# Patient Record
Sex: MALE | Race: BLACK OR AFRICAN AMERICAN | Employment: UNEMPLOYED | URBAN - METROPOLITAN AREA
[De-identification: names, ages, dates, MRNs, and addresses within clinical notes are randomized per-mention and may not be internally consistent; named-entity substitution may affect disease eponyms.]

---

## 2018-08-15 PROBLEM — F10.20 ALCOHOL DEPENDENCE (HCC): Status: ACTIVE | Noted: 2018-08-15

## 2018-08-15 PROBLEM — F33.2 MAJOR DEPRESSIVE DISORDER, RECURRENT EPISODE, SEVERE (HCC): Status: ACTIVE | Noted: 2018-08-15

## 2018-08-15 PROBLEM — F32.A DEPRESSION: Status: ACTIVE | Noted: 2018-08-15

## 2019-09-20 ENCOUNTER — HOSPITAL ENCOUNTER (OUTPATIENT)
Dept: MRI IMAGING | Age: 59
Discharge: HOME | End: 2019-09-20
Attending: ORTHOPAEDIC SURGERY
Payer: MEDICAID

## 2019-09-20 DIAGNOSIS — M51.26 LUMBAR DISC HERNIATION: ICD-10-CM

## 2019-09-20 DIAGNOSIS — M48.061 LUMBAR STENOSIS: ICD-10-CM

## 2019-09-20 PROCEDURE — 72148 MRI LUMBAR SPINE W/O DYE: CPT

## 2019-09-24 ENCOUNTER — HOSPITAL ENCOUNTER (OUTPATIENT)
Dept: PREADMISSION TESTING | Age: 59
Discharge: HOME | End: 2019-09-24
Payer: MEDICAID

## 2019-09-24 VITALS
TEMPERATURE: 98 F | BODY MASS INDEX: 31.85 KG/M2 | RESPIRATION RATE: 20 BRPM | HEART RATE: 90 BPM | DIASTOLIC BLOOD PRESSURE: 81 MMHG | WEIGHT: 227.5 LBS | SYSTOLIC BLOOD PRESSURE: 136 MMHG | HEIGHT: 71 IN

## 2019-09-24 LAB
ANION GAP SERPL CALC-SCNC: 6 MMOL/L (ref 5–15)
APPEARANCE UR: CLEAR
BACTERIA URNS QL MICRO: NEGATIVE /HPF
BILIRUB UR QL: NEGATIVE
BUN SERPL-MCNC: 15 MG/DL (ref 6–20)
BUN/CREAT SERPL: 12 (ref 12–20)
CALCIUM SERPL-MCNC: 9.8 MG/DL (ref 8.5–10.1)
CHLORIDE SERPL-SCNC: 102 MMOL/L (ref 97–108)
CO2 SERPL-SCNC: 29 MMOL/L (ref 21–32)
COLOR UR: NORMAL
CREAT SERPL-MCNC: 1.22 MG/DL (ref 0.7–1.3)
EPITH CASTS URNS QL MICRO: NORMAL /LPF
ERYTHROCYTE [DISTWIDTH] IN BLOOD BY AUTOMATED COUNT: 16.1 % (ref 11.5–14.5)
EST. AVERAGE GLUCOSE BLD GHB EST-MCNC: 131 MG/DL
GLUCOSE SERPL-MCNC: 89 MG/DL (ref 65–100)
GLUCOSE UR STRIP.AUTO-MCNC: NEGATIVE MG/DL
HBA1C MFR BLD: 6.2 % (ref 4.2–6.3)
HCT VFR BLD AUTO: 39 % (ref 36.6–50.3)
HGB BLD-MCNC: 12.2 G/DL (ref 12.1–17)
HGB UR QL STRIP: NEGATIVE
HYALINE CASTS URNS QL MICRO: NORMAL /LPF (ref 0–5)
INR PPP: 1 (ref 0.9–1.1)
KETONES UR QL STRIP.AUTO: NEGATIVE MG/DL
LEUKOCYTE ESTERASE UR QL STRIP.AUTO: NEGATIVE
MCH RBC QN AUTO: 24.2 PG (ref 26–34)
MCHC RBC AUTO-ENTMCNC: 31.3 G/DL (ref 30–36.5)
MCV RBC AUTO: 77.4 FL (ref 80–99)
NITRITE UR QL STRIP.AUTO: NEGATIVE
NRBC # BLD: 0 K/UL (ref 0–0.01)
NRBC BLD-RTO: 0 PER 100 WBC
PH UR STRIP: 6 [PH] (ref 5–8)
PLATELET # BLD AUTO: 296 K/UL (ref 150–400)
PMV BLD AUTO: 9 FL (ref 8.9–12.9)
POTASSIUM SERPL-SCNC: 4.3 MMOL/L (ref 3.5–5.1)
PROT UR STRIP-MCNC: NEGATIVE MG/DL
PROTHROMBIN TIME: 10.2 SEC (ref 9–11.1)
RBC # BLD AUTO: 5.04 M/UL (ref 4.1–5.7)
RBC #/AREA URNS HPF: NORMAL /HPF (ref 0–5)
SODIUM SERPL-SCNC: 137 MMOL/L (ref 136–145)
SP GR UR REFRACTOMETRY: 1.01 (ref 1–1.03)
UA: UC IF INDICATED,UAUC: NORMAL
UROBILINOGEN UR QL STRIP.AUTO: 0.2 EU/DL (ref 0.2–1)
WBC # BLD AUTO: 6.9 K/UL (ref 4.1–11.1)
WBC URNS QL MICRO: NORMAL /HPF (ref 0–4)

## 2019-09-24 PROCEDURE — 85027 COMPLETE CBC AUTOMATED: CPT

## 2019-09-24 PROCEDURE — 36415 COLL VENOUS BLD VENIPUNCTURE: CPT

## 2019-09-24 PROCEDURE — 80048 BASIC METABOLIC PNL TOTAL CA: CPT

## 2019-09-24 PROCEDURE — 83036 HEMOGLOBIN GLYCOSYLATED A1C: CPT

## 2019-09-24 PROCEDURE — 85610 PROTHROMBIN TIME: CPT

## 2019-09-24 PROCEDURE — 86900 BLOOD TYPING SEROLOGIC ABO: CPT

## 2019-09-24 PROCEDURE — 93005 ELECTROCARDIOGRAM TRACING: CPT

## 2019-09-24 PROCEDURE — 81001 URINALYSIS AUTO W/SCOPE: CPT

## 2019-09-24 RX ORDER — IPRATROPIUM BROMIDE AND ALBUTEROL SULFATE 2.5; .5 MG/3ML; MG/3ML
3 SOLUTION RESPIRATORY (INHALATION)
COMMUNITY

## 2019-09-24 RX ORDER — FUROSEMIDE 40 MG/1
40 TABLET ORAL DAILY
COMMUNITY
End: 2021-06-28

## 2019-09-24 RX ORDER — GABAPENTIN 800 MG/1
800 TABLET ORAL 3 TIMES DAILY
COMMUNITY

## 2019-09-24 RX ORDER — BACLOFEN 10 MG/1
10 TABLET ORAL 3 TIMES DAILY
COMMUNITY

## 2019-09-24 RX ORDER — LEVALBUTEROL INHALATION SOLUTION 0.63 MG/3ML
0.63 SOLUTION RESPIRATORY (INHALATION)
COMMUNITY
End: 2020-08-10

## 2019-09-24 RX ORDER — ATORVASTATIN CALCIUM 40 MG/1
40 TABLET, FILM COATED ORAL
COMMUNITY

## 2019-09-24 RX ORDER — LANOLIN ALCOHOL/MO/W.PET/CERES
50 CREAM (GRAM) TOPICAL 2 TIMES DAILY
COMMUNITY

## 2019-09-24 RX ORDER — ACETAMINOPHEN 325 MG/1
650 TABLET ORAL
COMMUNITY
End: 2020-08-10

## 2019-09-24 RX ORDER — MONTELUKAST SODIUM 10 MG/1
10 TABLET ORAL
COMMUNITY

## 2019-09-24 RX ORDER — ONDANSETRON 4 MG/1
4 TABLET, FILM COATED ORAL
COMMUNITY

## 2019-09-24 RX ORDER — SPIRONOLACTONE 25 MG/1
25 TABLET ORAL DAILY
COMMUNITY
End: 2020-08-10

## 2019-09-24 RX ORDER — DILTIAZEM HYDROCHLORIDE 360 MG/1
360 CAPSULE, EXTENDED RELEASE ORAL
COMMUNITY
End: 2021-06-28

## 2019-09-24 NOTE — PERIOP NOTES
PATIENT IS A POOR HISTORIAN FOR HEALTH CARE. HE DOES NOT REMEMBER NAMES OF PHYSICIANS CARING FOR HIM. HE STATED HE WOULD GO BACK TO THE NURSING HOME TO RESEARCH THIS AND CALL BACK WITH NAMES AND PHONE NUMBERS. DR GILBERT'S OFFICE MADE AWARE. VOICEMAIL LEFT FOR DR [facility] NURSE.

## 2019-09-24 NOTE — PERIOP NOTES
Preoperative instructions reviewed with patient. Patient given  CHG liquid. Instructions reviewed on use of CHG liquid. Patient given SSI infection FAQ sheet. MRSA/MSSA treatment instruction sheet given with an explanation to patient that they will be notified if treatment instructions need to be initiated. Patient was given opportunity to ask questions on the information provided.

## 2019-09-25 PROBLEM — R73.09 ELEVATED HEMOGLOBIN A1C: Status: ACTIVE | Noted: 2019-09-25

## 2019-09-25 LAB
ABO + RH BLD: NORMAL
ATRIAL RATE: 83 BPM
BACTERIA SPEC CULT: ABNORMAL
BACTERIA SPEC CULT: ABNORMAL
BLOOD GROUP ANTIBODIES SERPL: NORMAL
CALCULATED P AXIS, ECG09: 58 DEGREES
CALCULATED R AXIS, ECG10: 20 DEGREES
CALCULATED T AXIS, ECG11: 39 DEGREES
DIAGNOSIS, 93000: NORMAL
P-R INTERVAL, ECG05: 160 MS
Q-T INTERVAL, ECG07: 368 MS
QRS DURATION, ECG06: 84 MS
QTC CALCULATION (BEZET), ECG08: 432 MS
SERVICE CMNT-IMP: ABNORMAL
SPECIMEN EXP DATE BLD: NORMAL
VENTRICULAR RATE, ECG03: 83 BPM

## 2019-09-25 RX ORDER — MUPIROCIN 20 MG/G
OINTMENT TOPICAL 2 TIMES DAILY
Qty: 22 G | Refills: 0 | Status: SHIPPED | OUTPATIENT
Start: 2019-09-25 | End: 2019-10-02

## 2019-09-25 NOTE — ADVANCED PRACTICE NURSE
Patient was called (identity confirmed with 2 patient identifiers) and informed of prelim MSSA, instructed to obtain mupirocin prescription and Chlorhexidine liquid soap from pharmacy per protocol. Written instructions given at time of Preadmission Testing were reinforced with patient. Patient was given an opportunity to have questions answered and contact information if needed. Also spoke to Gentry Guidry, manager at facility to ensure correct pharmacy and provide instruction, called pharmacy to ensure ability to receive electronic rx and correct pharmacy. 9/26- Final result MRSA.

## 2019-09-26 ENCOUNTER — APPOINTMENT (OUTPATIENT)
Dept: GENERAL RADIOLOGY | Age: 59
End: 2019-09-26
Attending: ORTHOPAEDIC SURGERY
Payer: MEDICAID

## 2019-09-26 ENCOUNTER — HOSPITAL ENCOUNTER (OUTPATIENT)
Age: 59
Setting detail: OBSERVATION
Discharge: NURSING, REHAB OR LONG-TERM CARE | End: 2019-09-27
Attending: ORTHOPAEDIC SURGERY | Admitting: ORTHOPAEDIC SURGERY
Payer: MEDICAID

## 2019-09-26 ENCOUNTER — ANESTHESIA EVENT (OUTPATIENT)
Dept: SURGERY | Age: 59
End: 2019-09-26
Payer: MEDICAID

## 2019-09-26 ENCOUNTER — ANESTHESIA (OUTPATIENT)
Dept: SURGERY | Age: 59
End: 2019-09-26
Payer: MEDICAID

## 2019-09-26 DIAGNOSIS — M48.061 SPINAL STENOSIS OF LUMBAR REGION, UNSPECIFIED WHETHER NEUROGENIC CLAUDICATION PRESENT: Primary | ICD-10-CM

## 2019-09-26 PROBLEM — Z22.322 MRSA CARRIER: Status: ACTIVE | Noted: 2019-09-26

## 2019-09-26 LAB
GLUCOSE BLD STRIP.AUTO-MCNC: 98 MG/DL (ref 65–100)
SERVICE CMNT-IMP: NORMAL

## 2019-09-26 PROCEDURE — 74011250636 HC RX REV CODE- 250/636: Performed by: NURSE ANESTHETIST, CERTIFIED REGISTERED

## 2019-09-26 PROCEDURE — 76060000034 HC ANESTHESIA 1.5 TO 2 HR: Performed by: ORTHOPAEDIC SURGERY

## 2019-09-26 PROCEDURE — 74011000250 HC RX REV CODE- 250: Performed by: NURSE ANESTHETIST, CERTIFIED REGISTERED

## 2019-09-26 PROCEDURE — 77030040356 HC CORD BPLR FRCP COVD -A: Performed by: ORTHOPAEDIC SURGERY

## 2019-09-26 PROCEDURE — 74011250637 HC RX REV CODE- 250/637: Performed by: ORTHOPAEDIC SURGERY

## 2019-09-26 PROCEDURE — 77030008684 HC TU ET CUF COVD -B: Performed by: NURSE ANESTHETIST, CERTIFIED REGISTERED

## 2019-09-26 PROCEDURE — 77030004391 HC BUR FLUT MEDT -C: Performed by: ORTHOPAEDIC SURGERY

## 2019-09-26 PROCEDURE — 94760 N-INVAS EAR/PLS OXIMETRY 1: CPT

## 2019-09-26 PROCEDURE — 99218 HC RM OBSERVATION: CPT

## 2019-09-26 PROCEDURE — 94640 AIRWAY INHALATION TREATMENT: CPT

## 2019-09-26 PROCEDURE — 77030031139 HC SUT VCRL2 J&J -A: Performed by: ORTHOPAEDIC SURGERY

## 2019-09-26 PROCEDURE — 76010000162 HC OR TIME 1.5 TO 2 HR INTENSV-TIER 1: Performed by: ORTHOPAEDIC SURGERY

## 2019-09-26 PROCEDURE — 74011250636 HC RX REV CODE- 250/636: Performed by: ANESTHESIOLOGY

## 2019-09-26 PROCEDURE — 82962 GLUCOSE BLOOD TEST: CPT

## 2019-09-26 PROCEDURE — 77030029099 HC BN WAX SSPC -A: Performed by: ORTHOPAEDIC SURGERY

## 2019-09-26 PROCEDURE — 77030026438 HC STYL ET INTUB CARD -A: Performed by: NURSE ANESTHETIST, CERTIFIED REGISTERED

## 2019-09-26 PROCEDURE — 77030018836 HC SOL IRR NACL ICUM -A: Performed by: ORTHOPAEDIC SURGERY

## 2019-09-26 PROCEDURE — 76210000017 HC OR PH I REC 1.5 TO 2 HR: Performed by: ORTHOPAEDIC SURGERY

## 2019-09-26 PROCEDURE — 74011000250 HC RX REV CODE- 250: Performed by: ORTHOPAEDIC SURGERY

## 2019-09-26 PROCEDURE — 77030012893

## 2019-09-26 PROCEDURE — 74011250636 HC RX REV CODE- 250/636

## 2019-09-26 PROCEDURE — 74011250636 HC RX REV CODE- 250/636: Performed by: ORTHOPAEDIC SURGERY

## 2019-09-26 PROCEDURE — 77030039266 HC ADH SKN EXOFIN S2SG -A: Performed by: ORTHOPAEDIC SURGERY

## 2019-09-26 PROCEDURE — 74011000272 HC RX REV CODE- 272: Performed by: ORTHOPAEDIC SURGERY

## 2019-09-26 PROCEDURE — 77030003666 HC NDL SPINAL BD -A: Performed by: ORTHOPAEDIC SURGERY

## 2019-09-26 PROCEDURE — 88304 TISSUE EXAM BY PATHOLOGIST: CPT

## 2019-09-26 PROCEDURE — 77030020782 HC GWN BAIR PAWS FLX 3M -B

## 2019-09-26 PROCEDURE — 77030040361 HC SLV COMPR DVT MDII -B: Performed by: ORTHOPAEDIC SURGERY

## 2019-09-26 PROCEDURE — 77030014647 HC SEAL FBRN TISSL BAXT -D: Performed by: ORTHOPAEDIC SURGERY

## 2019-09-26 RX ORDER — LANOLIN ALCOHOL/MO/W.PET/CERES
50 CREAM (GRAM) TOPICAL 2 TIMES DAILY
Status: DISCONTINUED | OUTPATIENT
Start: 2019-09-26 | End: 2019-09-27 | Stop reason: HOSPADM

## 2019-09-26 RX ORDER — AMOXICILLIN 250 MG
1 CAPSULE ORAL 2 TIMES DAILY
Status: DISCONTINUED | OUTPATIENT
Start: 2019-09-26 | End: 2019-09-27 | Stop reason: HOSPADM

## 2019-09-26 RX ORDER — ONDANSETRON 2 MG/ML
4 INJECTION INTRAMUSCULAR; INTRAVENOUS AS NEEDED
Status: DISCONTINUED | OUTPATIENT
Start: 2019-09-26 | End: 2019-09-26 | Stop reason: HOSPADM

## 2019-09-26 RX ORDER — SODIUM CHLORIDE 0.9 % (FLUSH) 0.9 %
5-40 SYRINGE (ML) INJECTION EVERY 8 HOURS
Status: DISCONTINUED | OUTPATIENT
Start: 2019-09-26 | End: 2019-09-26 | Stop reason: HOSPADM

## 2019-09-26 RX ORDER — GLYCOPYRROLATE 0.2 MG/ML
INJECTION INTRAMUSCULAR; INTRAVENOUS AS NEEDED
Status: DISCONTINUED | OUTPATIENT
Start: 2019-09-26 | End: 2019-09-26 | Stop reason: HOSPADM

## 2019-09-26 RX ORDER — DIPHENHYDRAMINE HCL 12.5MG/5ML
12.5 ELIXIR ORAL
Status: ACTIVE | OUTPATIENT
Start: 2019-09-26 | End: 2019-09-27

## 2019-09-26 RX ORDER — SODIUM CHLORIDE 0.9 % (FLUSH) 0.9 %
5-40 SYRINGE (ML) INJECTION AS NEEDED
Status: DISCONTINUED | OUTPATIENT
Start: 2019-09-26 | End: 2019-09-27 | Stop reason: HOSPADM

## 2019-09-26 RX ORDER — LEVETIRACETAM 500 MG/1
1000 TABLET ORAL 2 TIMES DAILY
Status: DISCONTINUED | OUTPATIENT
Start: 2019-09-26 | End: 2019-09-27 | Stop reason: HOSPADM

## 2019-09-26 RX ORDER — PANTOPRAZOLE SODIUM 40 MG/1
40 TABLET, DELAYED RELEASE ORAL
Status: DISCONTINUED | OUTPATIENT
Start: 2019-09-26 | End: 2019-09-27 | Stop reason: HOSPADM

## 2019-09-26 RX ORDER — SODIUM CHLORIDE 9 MG/ML
125 INJECTION, SOLUTION INTRAVENOUS CONTINUOUS
Status: DISPENSED | OUTPATIENT
Start: 2019-09-26 | End: 2019-09-27

## 2019-09-26 RX ORDER — TRAZODONE HYDROCHLORIDE 100 MG/1
100 TABLET ORAL
Status: DISCONTINUED | OUTPATIENT
Start: 2019-09-26 | End: 2019-09-27 | Stop reason: HOSPADM

## 2019-09-26 RX ORDER — MONTELUKAST SODIUM 10 MG/1
10 TABLET ORAL DAILY
Status: DISCONTINUED | OUTPATIENT
Start: 2019-09-26 | End: 2019-09-27 | Stop reason: HOSPADM

## 2019-09-26 RX ORDER — OXYCODONE HYDROCHLORIDE 5 MG/1
10 TABLET ORAL
Status: DISCONTINUED | OUTPATIENT
Start: 2019-09-26 | End: 2019-09-27 | Stop reason: HOSPADM

## 2019-09-26 RX ORDER — IPRATROPIUM BROMIDE AND ALBUTEROL SULFATE 2.5; .5 MG/3ML; MG/3ML
3 SOLUTION RESPIRATORY (INHALATION)
Status: DISCONTINUED | OUTPATIENT
Start: 2019-09-26 | End: 2019-09-27 | Stop reason: HOSPADM

## 2019-09-26 RX ORDER — HYDROMORPHONE HYDROCHLORIDE 1 MG/ML
INJECTION, SOLUTION INTRAMUSCULAR; INTRAVENOUS; SUBCUTANEOUS AS NEEDED
Status: DISCONTINUED | OUTPATIENT
Start: 2019-09-26 | End: 2019-09-26 | Stop reason: HOSPADM

## 2019-09-26 RX ORDER — IPRATROPIUM BROMIDE 0.5 MG/2.5ML
0.5 SOLUTION RESPIRATORY (INHALATION)
Status: DISCONTINUED | OUTPATIENT
Start: 2019-09-26 | End: 2019-09-27 | Stop reason: HOSPADM

## 2019-09-26 RX ORDER — LISINOPRIL 5 MG/1
2.5 TABLET ORAL DAILY
Status: DISCONTINUED | OUTPATIENT
Start: 2019-09-26 | End: 2019-09-27 | Stop reason: HOSPADM

## 2019-09-26 RX ORDER — FACIAL-BODY WIPES
10 EACH TOPICAL DAILY PRN
Status: DISCONTINUED | OUTPATIENT
Start: 2019-09-28 | End: 2019-09-27 | Stop reason: HOSPADM

## 2019-09-26 RX ORDER — OXYCODONE HYDROCHLORIDE 5 MG/1
5 TABLET ORAL
Status: DISCONTINUED | OUTPATIENT
Start: 2019-09-26 | End: 2019-09-27 | Stop reason: HOSPADM

## 2019-09-26 RX ORDER — NALTREXONE HYDROCHLORIDE 50 MG/1
50 TABLET, FILM COATED ORAL DAILY
Status: DISCONTINUED | OUTPATIENT
Start: 2019-09-26 | End: 2019-09-27 | Stop reason: HOSPADM

## 2019-09-26 RX ORDER — CYCLOBENZAPRINE HCL 10 MG
10 TABLET ORAL
Status: DISCONTINUED | OUTPATIENT
Start: 2019-09-26 | End: 2019-09-27 | Stop reason: HOSPADM

## 2019-09-26 RX ORDER — MIDAZOLAM HYDROCHLORIDE 1 MG/ML
INJECTION, SOLUTION INTRAMUSCULAR; INTRAVENOUS AS NEEDED
Status: DISCONTINUED | OUTPATIENT
Start: 2019-09-26 | End: 2019-09-26 | Stop reason: HOSPADM

## 2019-09-26 RX ORDER — BUPIVACAINE HYDROCHLORIDE AND EPINEPHRINE 5; 5 MG/ML; UG/ML
INJECTION, SOLUTION EPIDURAL; INTRACAUDAL; PERINEURAL AS NEEDED
Status: DISCONTINUED | OUTPATIENT
Start: 2019-09-26 | End: 2019-09-26 | Stop reason: HOSPADM

## 2019-09-26 RX ORDER — SODIUM CHLORIDE, SODIUM LACTATE, POTASSIUM CHLORIDE, CALCIUM CHLORIDE 600; 310; 30; 20 MG/100ML; MG/100ML; MG/100ML; MG/100ML
INJECTION, SOLUTION INTRAVENOUS
Status: DISCONTINUED | OUTPATIENT
Start: 2019-09-26 | End: 2019-09-26 | Stop reason: HOSPADM

## 2019-09-26 RX ORDER — POLYETHYLENE GLYCOL 3350 17 G/17G
17 POWDER, FOR SOLUTION ORAL DAILY
Status: DISCONTINUED | OUTPATIENT
Start: 2019-09-26 | End: 2019-09-27 | Stop reason: HOSPADM

## 2019-09-26 RX ORDER — VANCOMYCIN/0.9 % SOD CHLORIDE 1.5G/250ML
1500 PLASTIC BAG, INJECTION (ML) INTRAVENOUS EVERY 12 HOURS
Status: COMPLETED | OUTPATIENT
Start: 2019-09-26 | End: 2019-09-27

## 2019-09-26 RX ORDER — ATORVASTATIN CALCIUM 40 MG/1
40 TABLET, FILM COATED ORAL
Status: DISCONTINUED | OUTPATIENT
Start: 2019-09-26 | End: 2019-09-27 | Stop reason: HOSPADM

## 2019-09-26 RX ORDER — HYDROMORPHONE HYDROCHLORIDE 1 MG/ML
0.5 INJECTION, SOLUTION INTRAMUSCULAR; INTRAVENOUS; SUBCUTANEOUS
Status: DISCONTINUED | OUTPATIENT
Start: 2019-09-26 | End: 2019-09-26 | Stop reason: HOSPADM

## 2019-09-26 RX ORDER — ACETAMINOPHEN 10 MG/ML
INJECTION, SOLUTION INTRAVENOUS AS NEEDED
Status: DISCONTINUED | OUTPATIENT
Start: 2019-09-26 | End: 2019-09-26 | Stop reason: HOSPADM

## 2019-09-26 RX ORDER — NALOXONE HYDROCHLORIDE 0.4 MG/ML
0.4 INJECTION, SOLUTION INTRAMUSCULAR; INTRAVENOUS; SUBCUTANEOUS AS NEEDED
Status: DISCONTINUED | OUTPATIENT
Start: 2019-09-26 | End: 2019-09-27 | Stop reason: HOSPADM

## 2019-09-26 RX ORDER — BUDESONIDE 0.5 MG/2ML
500 INHALANT ORAL
Status: DISCONTINUED | OUTPATIENT
Start: 2019-09-26 | End: 2019-09-27 | Stop reason: HOSPADM

## 2019-09-26 RX ORDER — SODIUM CHLORIDE 0.9 % (FLUSH) 0.9 %
5-40 SYRINGE (ML) INJECTION AS NEEDED
Status: DISCONTINUED | OUTPATIENT
Start: 2019-09-26 | End: 2019-09-26 | Stop reason: HOSPADM

## 2019-09-26 RX ORDER — SPIRONOLACTONE 25 MG/1
25 TABLET ORAL DAILY
Status: DISCONTINUED | OUTPATIENT
Start: 2019-09-26 | End: 2019-09-27 | Stop reason: HOSPADM

## 2019-09-26 RX ORDER — MUPIROCIN 20 MG/G
OINTMENT TOPICAL 2 TIMES DAILY
Status: DISCONTINUED | OUTPATIENT
Start: 2019-09-26 | End: 2019-09-27 | Stop reason: HOSPADM

## 2019-09-26 RX ORDER — ONDANSETRON 2 MG/ML
INJECTION INTRAMUSCULAR; INTRAVENOUS AS NEEDED
Status: DISCONTINUED | OUTPATIENT
Start: 2019-09-26 | End: 2019-09-26 | Stop reason: HOSPADM

## 2019-09-26 RX ORDER — LIDOCAINE HYDROCHLORIDE 20 MG/ML
INJECTION, SOLUTION EPIDURAL; INFILTRATION; INTRACAUDAL; PERINEURAL AS NEEDED
Status: DISCONTINUED | OUTPATIENT
Start: 2019-09-26 | End: 2019-09-26 | Stop reason: HOSPADM

## 2019-09-26 RX ORDER — FENTANYL CITRATE 50 UG/ML
25 INJECTION, SOLUTION INTRAMUSCULAR; INTRAVENOUS
Status: COMPLETED | OUTPATIENT
Start: 2019-09-26 | End: 2019-09-26

## 2019-09-26 RX ORDER — KETOROLAC TROMETHAMINE 30 MG/ML
30 INJECTION, SOLUTION INTRAMUSCULAR; INTRAVENOUS
Status: COMPLETED | OUTPATIENT
Start: 2019-09-26 | End: 2019-09-26

## 2019-09-26 RX ORDER — FENTANYL CITRATE 50 UG/ML
INJECTION, SOLUTION INTRAMUSCULAR; INTRAVENOUS
Status: COMPLETED
Start: 2019-09-26 | End: 2019-09-26

## 2019-09-26 RX ORDER — GABAPENTIN 300 MG/1
900 CAPSULE ORAL 3 TIMES DAILY
Status: DISCONTINUED | OUTPATIENT
Start: 2019-09-26 | End: 2019-09-27 | Stop reason: HOSPADM

## 2019-09-26 RX ORDER — CEFAZOLIN SODIUM/WATER 2 G/20 ML
2 SYRINGE (ML) INTRAVENOUS
Status: COMPLETED | OUTPATIENT
Start: 2019-09-26 | End: 2019-09-26

## 2019-09-26 RX ORDER — VANCOMYCIN HYDROCHLORIDE 1 G/20ML
INJECTION, POWDER, LYOPHILIZED, FOR SOLUTION INTRAVENOUS AS NEEDED
Status: DISCONTINUED | OUTPATIENT
Start: 2019-09-26 | End: 2019-09-26 | Stop reason: HOSPADM

## 2019-09-26 RX ORDER — ARFORMOTEROL TARTRATE 15 UG/2ML
15 SOLUTION RESPIRATORY (INHALATION)
Status: DISCONTINUED | OUTPATIENT
Start: 2019-09-26 | End: 2019-09-27 | Stop reason: HOSPADM

## 2019-09-26 RX ORDER — NEOSTIGMINE METHYLSULFATE 1 MG/ML
INJECTION INTRAVENOUS AS NEEDED
Status: DISCONTINUED | OUTPATIENT
Start: 2019-09-26 | End: 2019-09-26 | Stop reason: HOSPADM

## 2019-09-26 RX ORDER — ACETAMINOPHEN 325 MG/1
650 TABLET ORAL EVERY 6 HOURS
Status: DISCONTINUED | OUTPATIENT
Start: 2019-09-26 | End: 2019-09-27 | Stop reason: HOSPADM

## 2019-09-26 RX ORDER — SODIUM CHLORIDE 0.9 % (FLUSH) 0.9 %
5-40 SYRINGE (ML) INJECTION EVERY 8 HOURS
Status: DISCONTINUED | OUTPATIENT
Start: 2019-09-26 | End: 2019-09-27 | Stop reason: HOSPADM

## 2019-09-26 RX ORDER — BACLOFEN 10 MG/1
10 TABLET ORAL 3 TIMES DAILY
Status: DISCONTINUED | OUTPATIENT
Start: 2019-09-26 | End: 2019-09-27 | Stop reason: HOSPADM

## 2019-09-26 RX ORDER — DIAZEPAM 10 MG/2ML
5 INJECTION INTRAMUSCULAR ONCE
Status: COMPLETED | OUTPATIENT
Start: 2019-09-26 | End: 2019-09-26

## 2019-09-26 RX ORDER — SODIUM CHLORIDE, SODIUM LACTATE, POTASSIUM CHLORIDE, CALCIUM CHLORIDE 600; 310; 30; 20 MG/100ML; MG/100ML; MG/100ML; MG/100ML
50 INJECTION, SOLUTION INTRAVENOUS CONTINUOUS
Status: DISCONTINUED | OUTPATIENT
Start: 2019-09-26 | End: 2019-09-26 | Stop reason: HOSPADM

## 2019-09-26 RX ORDER — SUCCINYLCHOLINE CHLORIDE 20 MG/ML
INJECTION INTRAMUSCULAR; INTRAVENOUS AS NEEDED
Status: DISCONTINUED | OUTPATIENT
Start: 2019-09-26 | End: 2019-09-26 | Stop reason: HOSPADM

## 2019-09-26 RX ORDER — VANCOMYCIN/0.9 % SOD CHLORIDE 1.5G/250ML
1500 PLASTIC BAG, INJECTION (ML) INTRAVENOUS
Status: COMPLETED | OUTPATIENT
Start: 2019-09-26 | End: 2019-09-26

## 2019-09-26 RX ORDER — LIDOCAINE HYDROCHLORIDE 10 MG/ML
0.1 INJECTION, SOLUTION EPIDURAL; INFILTRATION; INTRACAUDAL; PERINEURAL AS NEEDED
Status: DISCONTINUED | OUTPATIENT
Start: 2019-09-26 | End: 2019-09-26 | Stop reason: HOSPADM

## 2019-09-26 RX ORDER — FENTANYL CITRATE 50 UG/ML
INJECTION, SOLUTION INTRAMUSCULAR; INTRAVENOUS AS NEEDED
Status: DISCONTINUED | OUTPATIENT
Start: 2019-09-26 | End: 2019-09-26 | Stop reason: HOSPADM

## 2019-09-26 RX ORDER — FUROSEMIDE 40 MG/1
40 TABLET ORAL DAILY
Status: DISCONTINUED | OUTPATIENT
Start: 2019-09-26 | End: 2019-09-27 | Stop reason: HOSPADM

## 2019-09-26 RX ORDER — ALBUTEROL SULFATE 0.83 MG/ML
2.5 SOLUTION RESPIRATORY (INHALATION)
Status: DISCONTINUED | OUTPATIENT
Start: 2019-09-26 | End: 2019-09-27 | Stop reason: HOSPADM

## 2019-09-26 RX ORDER — ROCURONIUM BROMIDE 10 MG/ML
INJECTION, SOLUTION INTRAVENOUS AS NEEDED
Status: DISCONTINUED | OUTPATIENT
Start: 2019-09-26 | End: 2019-09-26 | Stop reason: HOSPADM

## 2019-09-26 RX ORDER — MORPHINE SULFATE 2 MG/ML
2 INJECTION, SOLUTION INTRAMUSCULAR; INTRAVENOUS
Status: DISCONTINUED | OUTPATIENT
Start: 2019-09-26 | End: 2019-09-27 | Stop reason: HOSPADM

## 2019-09-26 RX ORDER — DEXAMETHASONE SODIUM PHOSPHATE 4 MG/ML
INJECTION, SOLUTION INTRA-ARTICULAR; INTRALESIONAL; INTRAMUSCULAR; INTRAVENOUS; SOFT TISSUE AS NEEDED
Status: DISCONTINUED | OUTPATIENT
Start: 2019-09-26 | End: 2019-09-26 | Stop reason: HOSPADM

## 2019-09-26 RX ORDER — CEFAZOLIN SODIUM/WATER 2 G/20 ML
2 SYRINGE (ML) INTRAVENOUS EVERY 8 HOURS
Status: COMPLETED | OUTPATIENT
Start: 2019-09-26 | End: 2019-09-27

## 2019-09-26 RX ORDER — DILTIAZEM HYDROCHLORIDE 180 MG/1
360 CAPSULE, COATED, EXTENDED RELEASE ORAL
Status: DISCONTINUED | OUTPATIENT
Start: 2019-09-26 | End: 2019-09-27 | Stop reason: HOSPADM

## 2019-09-26 RX ORDER — MORPHINE SULFATE 10 MG/ML
2 INJECTION, SOLUTION INTRAMUSCULAR; INTRAVENOUS
Status: DISCONTINUED | OUTPATIENT
Start: 2019-09-26 | End: 2019-09-26 | Stop reason: HOSPADM

## 2019-09-26 RX ORDER — DIAZEPAM 10 MG/2ML
INJECTION INTRAMUSCULAR
Status: DISPENSED
Start: 2019-09-26 | End: 2019-09-26

## 2019-09-26 RX ORDER — ONDANSETRON 4 MG/1
4 TABLET, ORALLY DISINTEGRATING ORAL
Status: DISCONTINUED | OUTPATIENT
Start: 2019-09-26 | End: 2019-09-27 | Stop reason: HOSPADM

## 2019-09-26 RX ORDER — PROPOFOL 10 MG/ML
INJECTION, EMULSION INTRAVENOUS AS NEEDED
Status: DISCONTINUED | OUTPATIENT
Start: 2019-09-26 | End: 2019-09-26 | Stop reason: HOSPADM

## 2019-09-26 RX ADMIN — SPIRONOLACTONE 25 MG: 25 TABLET ORAL at 12:23

## 2019-09-26 RX ADMIN — PROPOFOL 150 MG: 10 INJECTION, EMULSION INTRAVENOUS at 07:47

## 2019-09-26 RX ADMIN — MUPIROCIN: 20 OINTMENT TOPICAL at 18:31

## 2019-09-26 RX ADMIN — BACLOFEN 10 MG: 10 TABLET ORAL at 22:37

## 2019-09-26 RX ADMIN — FENTANYL CITRATE 25 MCG: 50 INJECTION, SOLUTION INTRAMUSCULAR; INTRAVENOUS at 10:01

## 2019-09-26 RX ADMIN — BACLOFEN 10 MG: 10 TABLET ORAL at 18:30

## 2019-09-26 RX ADMIN — FENTANYL CITRATE 25 MCG: 50 INJECTION, SOLUTION INTRAMUSCULAR; INTRAVENOUS at 09:29

## 2019-09-26 RX ADMIN — Medication 10 ML: at 22:39

## 2019-09-26 RX ADMIN — DILTIAZEM HYDROCHLORIDE 360 MG: 180 CAPSULE, COATED, EXTENDED RELEASE ORAL at 22:38

## 2019-09-26 RX ADMIN — HYDROMORPHONE HYDROCHLORIDE 0.5 MG: 1 INJECTION, SOLUTION INTRAMUSCULAR; INTRAVENOUS; SUBCUTANEOUS at 09:03

## 2019-09-26 RX ADMIN — ATORVASTATIN CALCIUM 40 MG: 40 TABLET, FILM COATED ORAL at 22:38

## 2019-09-26 RX ADMIN — Medication 2 G: at 07:56

## 2019-09-26 RX ADMIN — OXYCODONE HYDROCHLORIDE 10 MG: 5 TABLET ORAL at 22:38

## 2019-09-26 RX ADMIN — MUPIROCIN: 20 OINTMENT TOPICAL at 12:23

## 2019-09-26 RX ADMIN — OXYCODONE HYDROCHLORIDE 10 MG: 5 TABLET ORAL at 20:04

## 2019-09-26 RX ADMIN — GABAPENTIN 900 MG: 300 CAPSULE ORAL at 22:37

## 2019-09-26 RX ADMIN — BUSPIRONE HYDROCHLORIDE 15 MG: 5 TABLET ORAL at 18:30

## 2019-09-26 RX ADMIN — KETOROLAC TROMETHAMINE 30 MG: 30 INJECTION, SOLUTION INTRAMUSCULAR at 12:23

## 2019-09-26 RX ADMIN — HYDROMORPHONE HYDROCHLORIDE 0.5 MG: 1 INJECTION, SOLUTION INTRAMUSCULAR; INTRAVENOUS; SUBCUTANEOUS at 10:21

## 2019-09-26 RX ADMIN — DEXAMETHASONE SODIUM PHOSPHATE 4 MG: 4 INJECTION, SOLUTION INTRAMUSCULAR; INTRAVENOUS at 08:08

## 2019-09-26 RX ADMIN — DIAZEPAM 5 MG: 5 INJECTION, SOLUTION INTRAMUSCULAR; INTRAVENOUS at 10:13

## 2019-09-26 RX ADMIN — BUSPIRONE HYDROCHLORIDE 15 MG: 5 TABLET ORAL at 22:38

## 2019-09-26 RX ADMIN — NALTREXONE HYDROCHLORIDE 50 MG: 50 TABLET, FILM COATED ORAL at 12:25

## 2019-09-26 RX ADMIN — ROCURONIUM BROMIDE 35 MG: 10 SOLUTION INTRAVENOUS at 07:51

## 2019-09-26 RX ADMIN — ONDANSETRON HYDROCHLORIDE 4 MG: 2 INJECTION, SOLUTION INTRAMUSCULAR; INTRAVENOUS at 08:08

## 2019-09-26 RX ADMIN — MONTELUKAST 10 MG: 10 TABLET, FILM COATED ORAL at 12:24

## 2019-09-26 RX ADMIN — ROCURONIUM BROMIDE 5 MG: 10 SOLUTION INTRAVENOUS at 07:47

## 2019-09-26 RX ADMIN — VANCOMYCIN HYDROCHLORIDE 1500 MG: 10 INJECTION, POWDER, LYOPHILIZED, FOR SOLUTION INTRAVENOUS at 22:48

## 2019-09-26 RX ADMIN — LIDOCAINE HYDROCHLORIDE 80 MG: 20 INJECTION, SOLUTION EPIDURAL; INFILTRATION; INTRACAUDAL; PERINEURAL at 07:47

## 2019-09-26 RX ADMIN — CYCLOBENZAPRINE 10 MG: 10 TABLET, FILM COATED ORAL at 22:37

## 2019-09-26 RX ADMIN — CYCLOBENZAPRINE 10 MG: 10 TABLET, FILM COATED ORAL at 12:24

## 2019-09-26 RX ADMIN — FENTANYL CITRATE 25 MCG: 50 INJECTION, SOLUTION INTRAMUSCULAR; INTRAVENOUS at 09:22

## 2019-09-26 RX ADMIN — NEOSTIGMINE METHYLSULFATE 3 MG: 1 INJECTION, SOLUTION INTRAMUSCULAR; INTRAVENOUS; SUBCUTANEOUS at 09:09

## 2019-09-26 RX ADMIN — HYDROMORPHONE HYDROCHLORIDE 0.5 MG: 1 INJECTION, SOLUTION INTRAMUSCULAR; INTRAVENOUS; SUBCUTANEOUS at 10:38

## 2019-09-26 RX ADMIN — OXYCODONE HYDROCHLORIDE 10 MG: 5 TABLET ORAL at 12:24

## 2019-09-26 RX ADMIN — SENNOSIDES, DOCUSATE SODIUM 1 TABLET: 50; 8.6 TABLET, FILM COATED ORAL at 12:24

## 2019-09-26 RX ADMIN — SENNOSIDES, DOCUSATE SODIUM 1 TABLET: 50; 8.6 TABLET, FILM COATED ORAL at 18:26

## 2019-09-26 RX ADMIN — FENTANYL CITRATE 75 MCG: 50 INJECTION, SOLUTION INTRAMUSCULAR; INTRAVENOUS at 07:47

## 2019-09-26 RX ADMIN — OXYCODONE HYDROCHLORIDE 10 MG: 5 TABLET ORAL at 16:23

## 2019-09-26 RX ADMIN — LEVETIRACETAM 1000 MG: 500 TABLET ORAL at 12:25

## 2019-09-26 RX ADMIN — FENTANYL CITRATE 25 MCG: 50 INJECTION, SOLUTION INTRAMUSCULAR; INTRAVENOUS at 07:37

## 2019-09-26 RX ADMIN — Medication 50 MG: at 18:26

## 2019-09-26 RX ADMIN — MIDAZOLAM 2 MG: 1 INJECTION INTRAMUSCULAR; INTRAVENOUS at 07:37

## 2019-09-26 RX ADMIN — ACETAMINOPHEN 650 MG: 325 TABLET, FILM COATED ORAL at 12:24

## 2019-09-26 RX ADMIN — Medication 2 G: at 18:30

## 2019-09-26 RX ADMIN — FENTANYL CITRATE 25 MCG: 50 INJECTION, SOLUTION INTRAMUSCULAR; INTRAVENOUS at 09:17

## 2019-09-26 RX ADMIN — FENTANYL CITRATE 25 MCG: 50 INJECTION, SOLUTION INTRAMUSCULAR; INTRAVENOUS at 10:32

## 2019-09-26 RX ADMIN — SUCCINYLCHOLINE CHLORIDE 140 MG: 20 INJECTION, SOLUTION INTRAMUSCULAR; INTRAVENOUS at 07:48

## 2019-09-26 RX ADMIN — IPRATROPIUM BROMIDE 0.5 MG: 0.5 SOLUTION RESPIRATORY (INHALATION) at 20:00

## 2019-09-26 RX ADMIN — FUROSEMIDE 40 MG: 40 TABLET ORAL at 12:24

## 2019-09-26 RX ADMIN — DULOXETINE HYDROCHLORIDE 90 MG: 60 CAPSULE, DELAYED RELEASE ORAL at 12:24

## 2019-09-26 RX ADMIN — GLYCOPYRROLATE 0.4 MG: 0.2 INJECTION, SOLUTION INTRAMUSCULAR; INTRAVENOUS at 09:09

## 2019-09-26 RX ADMIN — VANCOMYCIN HYDROCHLORIDE 1.5 G: 10 INJECTION, POWDER, LYOPHILIZED, FOR SOLUTION INTRAVENOUS at 07:51

## 2019-09-26 RX ADMIN — HYDROMORPHONE HYDROCHLORIDE 0.5 MG: 1 INJECTION, SOLUTION INTRAMUSCULAR; INTRAVENOUS; SUBCUTANEOUS at 09:13

## 2019-09-26 RX ADMIN — BUDESONIDE 500 MCG: 0.5 INHALANT RESPIRATORY (INHALATION) at 21:00

## 2019-09-26 RX ADMIN — HYDROMORPHONE HYDROCHLORIDE 0.5 MG: 1 INJECTION, SOLUTION INTRAMUSCULAR; INTRAVENOUS; SUBCUTANEOUS at 11:05

## 2019-09-26 RX ADMIN — PANTOPRAZOLE SODIUM 40 MG: 40 TABLET, DELAYED RELEASE ORAL at 18:26

## 2019-09-26 RX ADMIN — FENTANYL CITRATE 25 MCG: 50 INJECTION, SOLUTION INTRAMUSCULAR; INTRAVENOUS at 10:14

## 2019-09-26 RX ADMIN — FENTANYL CITRATE 25 MCG: 50 INJECTION, SOLUTION INTRAMUSCULAR; INTRAVENOUS at 09:47

## 2019-09-26 RX ADMIN — Medication 50 MG: at 12:23

## 2019-09-26 RX ADMIN — FENTANYL CITRATE 25 MCG: 50 INJECTION, SOLUTION INTRAMUSCULAR; INTRAVENOUS at 09:33

## 2019-09-26 RX ADMIN — LISINOPRIL 2.5 MG: 5 TABLET ORAL at 12:24

## 2019-09-26 RX ADMIN — LEVETIRACETAM 1000 MG: 500 TABLET ORAL at 18:43

## 2019-09-26 RX ADMIN — POLYETHYLENE GLYCOL 3350 17 G: 17 POWDER, FOR SOLUTION ORAL at 12:23

## 2019-09-26 RX ADMIN — HYDROMORPHONE HYDROCHLORIDE 0.5 MG: 1 INJECTION, SOLUTION INTRAMUSCULAR; INTRAVENOUS; SUBCUTANEOUS at 10:05

## 2019-09-26 RX ADMIN — ACETAMINOPHEN 650 MG: 325 TABLET, FILM COATED ORAL at 18:26

## 2019-09-26 RX ADMIN — ACETAMINOPHEN 1000 MG: 10 INJECTION, SOLUTION INTRAVENOUS at 09:06

## 2019-09-26 RX ADMIN — FENTANYL CITRATE 25 MCG: 50 INJECTION INTRAMUSCULAR; INTRAVENOUS at 09:47

## 2019-09-26 RX ADMIN — GABAPENTIN 900 MG: 300 CAPSULE ORAL at 18:30

## 2019-09-26 RX ADMIN — SODIUM CHLORIDE, POTASSIUM CHLORIDE, SODIUM LACTATE AND CALCIUM CHLORIDE: 600; 310; 30; 20 INJECTION, SOLUTION INTRAVENOUS at 07:20

## 2019-09-26 NOTE — PROGRESS NOTES
TRANSFER - IN REPORT:    Verbal report received from Justus May RN on Bee Mishra  being received from PACU for routine post - op      Report consisted of patients Situation, Background, Assessment and   Recommendations(SBAR). Information from the following report(s) SBAR, Kardex, OR Summary, Intake/Output, MAR and Dual Neuro Assessment was reviewed with the receiving nurse. Opportunity for questions and clarification was provided.

## 2019-09-26 NOTE — ROUTINE PROCESS
Patient: Eloise Javier MRN: [id number]  SSN: [id number]   YOB: 1960  Age: 63 y.o. Sex: male     Patient is status post Procedure(s):  L4-SI DECOMPRESSION. Surgeon(s) and Role:     * Severo Ebbs, MD - Primary    Local/Dose/Irrigation:  See [facility]                  Peripheral IV 09/26/19 Right Forearm (Active)   Site Assessment Clean, dry, & intact 9/26/2019  7:39 AM   Phlebitis Assessment 0 9/26/2019  7:39 AM   Infiltration Assessment 0 9/26/2019  7:39 AM   Dressing Status Clean, dry, & intact 9/26/2019  7:39 AM   Dressing Type Tape;Transparent 9/26/2019  7:39 AM   Hub Color/Line Status Pink; Infusing 9/26/2019  7:39 AM       Peripheral IV Left Hand (Active)          Hemovac Back (Active)      Airway - Endotracheal Tube 09/26/19 (Active)                   Dressing/Packing:  Wound Back-Dressing Type: 4 x 4 (09/26/19 0904)    Splint/Cast:  ]    Other:

## 2019-09-26 NOTE — ANESTHESIA POSTPROCEDURE EVALUATION
Post-Anesthesia Evaluation and Assessment    Patient: Steve Lin MRN: [id number]  SSN: [id number]    YOB: 1960  Age: 63 y.o. Sex: male      I have evaluated the patient and they are stable and ready for discharge from the PACU. Cardiovascular Function/Vital Signs  Visit Vitals  /82   Pulse 85   Temp 36.6 °C (97.9 °F)   Resp 20   Ht 5' 11\" (1.803 m)   Wt 103 kg (227 lb)   SpO2 97%   BMI 31.66 kg/m²       Patient is status post General anesthesia for Procedure(s):  L4-SI DECOMPRESSION. Nausea/Vomiting: None    Postoperative hydration reviewed and adequate. Pain:  Pain Scale 1: Numeric (0 - 10) (09/26/19 1053)  Pain Intensity 1: 7 (09/26/19 1053)   Managed    Neurological Status:   Neuro (WDL): Within Defined Limits (09/26/19 1053)  Neuro  LUE Motor Response: Purposeful(c/o pain in left arm/shoulder) (09/26/19 0935)  LLE Motor Response: Purposeful (09/26/19 0935)  RUE Motor Response: Purposeful (09/26/19 0935)  RLE Motor Response: Purposeful (09/26/19 0935)   At baseline    Mental Status, Level of Consciousness: Alert and  oriented to person, place, and time    Pulmonary Status:   O2 Device: Nasal cannula (09/26/19 1053)   Adequate oxygenation and airway patent    Complications related to anesthesia: None    Post-anesthesia assessment completed. No concerns    Signed By: Heide Lennox, MD     September 26, 2019              Procedure(s):  L4-SI DECOMPRESSION. general    <BSHSIANPOST>    Vitals Value Taken Time   /82 9/26/2019 10:45 AM   Temp 36.6 °C (97.9 °F) 9/26/2019  9:35 AM   Pulse 85 9/26/2019 10:56 AM   Resp 12 9/26/2019 10:56 AM   SpO2 96 % 9/26/2019 10:56 AM   Vitals shown include unvalidated device data.

## 2019-09-26 NOTE — PERIOP NOTES
Consulted with Dr. Diane Good and called OR to pass information on left shoulder pain to Dr. Valentine Marshall. Xray ordered. Patient now able to lift arm off of bed.  stronger. Pain still 7/10. Continue to monitor. Will discharge from PACU and Dr. Valentine Marshall will follow up.

## 2019-09-26 NOTE — PERIOP NOTES
Han Palma, [facility] for Dr. Rola Trejo in to assess patient. Will monitor patient.

## 2019-09-26 NOTE — OP NOTES
[address]  OPERATIVE REPORT    Name:  Ev Freeman  MR#:  [id number]  :  1960  ACCOUNT #:  [de-identified]  DATE OF SERVICE:  2019    PREOPERATIVE DIAGNOSES:  Spinal stenosis, epidural lipomatosis. POSTOPERATIVE DIAGNOSES:  Spinal stenosis, epidural lipomatosis, and facet cyst.    PROCEDURES PERFORMED:  1. L5-S1 laminectomy, decompression. 2.  L4-5 laminectomy, decompression, removal of facet cyst.  3.  S1 laminectomy, decompression, removal of epidural lipomatosis at the S1-2 level. SURGEON:  Umang Martin MD    ASSISTANT:  Cris perales. ANESTHESIA:  General.    COMPLICATIONS:  None. SPECIMENS REMOVED:  Most likely facet cyst.    IMPLANTS:  None. ESTIMATED BLOOD LOSS:  Approximately 150 mL. INDICATIONS FOR PROCEDURE:  The patient is a pleasant male, who has problems with walking. He had severe stenosis due to epidural lipomatosis and a lesion at L4-5 on MRI. After discussing the risks and benefits, he elected to proceed with decompression. He understood the risks including nerve damage, infection, being no better, being worse, continued pain, nerve damage, still having problems with walking, continued epidural lipomatosis, CSF leak, infection and he elected to proceed. PROCEDURE:  The patient was laid prone on the operating table, prepped and draped in normal fashion using alcohol and DuraPrep. Skin incision was made. Soft tissue was dissected down from L3 to S1. Laminotomies were done at L5 and L4 using a high-speed jimy and Kerrison punches. A lot of epidural lipomatosis was removed and then, a facet cyst type of mass was removed at L4-5. It was more midline to stuff that now was sent to Pathology. After this, medial facetectomies, foraminotomies were done at each level at L4-5 and L5-S1 using the high-speed jimy and Kerrison punches. The epidural lipomatosis was also removed from the ventral cord using a Sanches ball and suction.   Then, S1 laminectomy was done, medial facetectomies and then, all of the epidural lipomatosis ventral and dorsal to the cord was again removed using Sanches ball, Kerrison punches, and suction. After this, the cauda equina and the nerve roots were completely free from L4 to S1. A central facet cyst was sent for pathology. The dura was checked for any leaks. There were none. Meticulous hemostasis maintained. It was irrigated. 500 mg of vancomycin was placed in the wound for infection prophylaxis, especially with his history of MRSA in his nostrils. Deep drain was placed. The fascia was closed with #1 Vicryl; the skin was closed with 2-0 Vicryl, 3-0 Vicryl, and Dermabond. There were no complications to the procedure. I, Dr. Mirian Carson, did the procedure myself with the help of Whit Mantilla NP, who helped in all parts of the procedure including positioning, decompression, and closure.         Anna England MD      [id number]/  D:  09/26/2019 9:34  T:  09/26/2019 18:05  JOB #:  [id number]

## 2019-09-26 NOTE — PERIOP NOTES
Patient c.o severe left shoulder pain. 10/10. Dr. Cherrie Closs notified and in to see patient at the bedside. Valium 5 mg IV given per order to relieve muscle spasms. Patient had previous left rotator cuff repair.

## 2019-09-26 NOTE — ANESTHESIA PREPROCEDURE EVALUATION
Relevant Problems   No relevant active problems       Anesthetic History               Review of Systems / Medical History  Patient summary reviewed, nursing notes reviewed and pertinent labs reviewed    Pulmonary    COPD               Neuro/Psych     seizures    Psychiatric history     Cardiovascular    Hypertension                   GI/Hepatic/Renal                Endo/Other        Arthritis     Other Findings              Physical Exam    Airway  Mallampati: I  TM Distance: > 6 cm  Neck ROM: normal range of motion   Mouth opening: Normal     Cardiovascular    Rhythm: regular  Rate: normal         Dental  No notable dental hx       Pulmonary  Breath sounds clear to auscultation               Abdominal         Other Findings            Anesthetic Plan    ASA: 3  Anesthesia type: general          Induction: Intravenous  Anesthetic plan and risks discussed with: Patient

## 2019-09-26 NOTE — PERIOP NOTES
TRANSFER - OUT REPORT:    Verbal report given to Reji(name) on Maged Mendez  being transferred to 54(unit) for routine post - op       Report consisted of patients Situation, Background, Assessment and   Recommendations(SBAR). Time Pre op antibiotic given:0756  Anesthesia Stop time: 6962  Hernandez Present on Transfer to floor:no  Order for Hernandez on Chart:no  Discharge Prescriptions with Chart:no    Information from the following report(s) SBAR, Kardex, OR Summary, Procedure Summary, Intake/Output, MAR, Recent Results and Med Rec Status was reviewed with the receiving nurse. Opportunity for questions and clarification was provided. Is the patient on 02? YES       L/Min 2       Other     Is the patient on a monitor? NO    Is the nurse transporting with the patient? NO    Surgical Waiting Area notified of patient's transfer from PACU? YES      The following personal items collected during your admission accompanied patient upon transfer:   Dental Appliance: Dental Appliances: Uppers, Partials(sent to pacu)  Vision:    Hearing Aid:    Jewelry: Jewelry: None  Clothing: Clothing: Footwear, Shirt, Pants(clothes to [employer]; tennis shoes tied to walker)  Other Valuables: Other Valuables: Walker(sent to pacu)  Valuables sent to safe:        Clothes, phone, dentures, and walker sent to floor.

## 2019-09-26 NOTE — BRIEF OP NOTE
BRIEF OPERATIVE NOTE    Date of Procedure: 9/26/2019   Preoperative Diagnosis: STENOSIS, LUMBAR DISC HERNIATION  Postoperative Diagnosis: STENOSIS, LUMBAR DISC HERNIATION    Procedure(s):  L4-SI DECOMPRESSION  Surgeon(s) and Role:     * Francisco Camara MD - Primary         Surgical Assistant: Carli perales    Surgical Staff:  Circ-1: Jahaira Spencer RN  Scrub Tech-1: Marjan Brownlee  Nurse Practitioner: Ignacio Salinas NP  Event Time In Time Out   Incision Start 0801    Incision Close       Anesthesia: General   Estimated Blood Loss: 200cc  Specimens:   ID Type Source Tests Collected by Time Destination   1 : extradural mass L4-5.   Fresh Lumbar  Francisco Camara MD 9/26/2019 7886 Pathology      Findings: stenosis   Complications: 0  Implants: * No implants in log *

## 2019-09-27 VITALS
BODY MASS INDEX: 31.78 KG/M2 | OXYGEN SATURATION: 96 % | TEMPERATURE: 98.2 F | WEIGHT: 227 LBS | RESPIRATION RATE: 16 BRPM | DIASTOLIC BLOOD PRESSURE: 67 MMHG | SYSTOLIC BLOOD PRESSURE: 119 MMHG | HEIGHT: 71 IN | HEART RATE: 102 BPM

## 2019-09-27 LAB
ANION GAP SERPL CALC-SCNC: 7 MMOL/L (ref 5–15)
BUN SERPL-MCNC: 13 MG/DL (ref 6–20)
BUN/CREAT SERPL: 12 (ref 12–20)
CALCIUM SERPL-MCNC: 8.8 MG/DL (ref 8.5–10.1)
CHLORIDE SERPL-SCNC: 105 MMOL/L (ref 97–108)
CO2 SERPL-SCNC: 27 MMOL/L (ref 21–32)
CREAT SERPL-MCNC: 1.1 MG/DL (ref 0.7–1.3)
GLUCOSE SERPL-MCNC: 136 MG/DL (ref 65–100)
HGB BLD-MCNC: 9.7 G/DL (ref 12.1–17)
POTASSIUM SERPL-SCNC: 4.1 MMOL/L (ref 3.5–5.1)
SODIUM SERPL-SCNC: 139 MMOL/L (ref 136–145)

## 2019-09-27 PROCEDURE — 85018 HEMOGLOBIN: CPT

## 2019-09-27 PROCEDURE — 36415 COLL VENOUS BLD VENIPUNCTURE: CPT

## 2019-09-27 PROCEDURE — 99218 HC RM OBSERVATION: CPT

## 2019-09-27 PROCEDURE — 97116 GAIT TRAINING THERAPY: CPT

## 2019-09-27 PROCEDURE — 97165 OT EVAL LOW COMPLEX 30 MIN: CPT | Performed by: OCCUPATIONAL THERAPIST

## 2019-09-27 PROCEDURE — 74011250636 HC RX REV CODE- 250/636: Performed by: NURSE PRACTITIONER

## 2019-09-27 PROCEDURE — 74011000250 HC RX REV CODE- 250: Performed by: ORTHOPAEDIC SURGERY

## 2019-09-27 PROCEDURE — 94640 AIRWAY INHALATION TREATMENT: CPT

## 2019-09-27 PROCEDURE — 80048 BASIC METABOLIC PNL TOTAL CA: CPT

## 2019-09-27 PROCEDURE — 94760 N-INVAS EAR/PLS OXIMETRY 1: CPT

## 2019-09-27 PROCEDURE — 74011250636 HC RX REV CODE- 250/636: Performed by: ORTHOPAEDIC SURGERY

## 2019-09-27 PROCEDURE — 77010033678 HC OXYGEN DAILY

## 2019-09-27 PROCEDURE — 97162 PT EVAL MOD COMPLEX 30 MIN: CPT

## 2019-09-27 PROCEDURE — 74011250637 HC RX REV CODE- 250/637: Performed by: ORTHOPAEDIC SURGERY

## 2019-09-27 PROCEDURE — 97535 SELF CARE MNGMENT TRAINING: CPT | Performed by: OCCUPATIONAL THERAPIST

## 2019-09-27 RX ORDER — AMOXICILLIN 250 MG
1 CAPSULE ORAL 2 TIMES DAILY
Qty: 30 TAB | Refills: 0 | Status: SHIPPED | OUTPATIENT
Start: 2019-09-27 | End: 2020-08-10

## 2019-09-27 RX ORDER — OXYCODONE HYDROCHLORIDE 5 MG/1
5 TABLET ORAL
Qty: 40 TAB | Refills: 0 | Status: SHIPPED | OUTPATIENT
Start: 2019-09-27 | End: 2019-10-04

## 2019-09-27 RX ADMIN — GABAPENTIN 900 MG: 300 CAPSULE ORAL at 16:10

## 2019-09-27 RX ADMIN — POLYETHYLENE GLYCOL 3350 17 G: 17 POWDER, FOR SOLUTION ORAL at 08:30

## 2019-09-27 RX ADMIN — ACETAMINOPHEN 650 MG: 325 TABLET, FILM COATED ORAL at 17:21

## 2019-09-27 RX ADMIN — OXYCODONE HYDROCHLORIDE 10 MG: 5 TABLET ORAL at 06:52

## 2019-09-27 RX ADMIN — MORPHINE SULFATE 2 MG: 2 INJECTION, SOLUTION INTRAMUSCULAR; INTRAVENOUS at 06:13

## 2019-09-27 RX ADMIN — LISINOPRIL 2.5 MG: 5 TABLET ORAL at 08:31

## 2019-09-27 RX ADMIN — OXYCODONE HYDROCHLORIDE 10 MG: 5 TABLET ORAL at 19:29

## 2019-09-27 RX ADMIN — ACETAMINOPHEN 650 MG: 325 TABLET, FILM COATED ORAL at 01:19

## 2019-09-27 RX ADMIN — ARFORMOTEROL TARTRATE 15 MCG: 15 SOLUTION RESPIRATORY (INHALATION) at 07:29

## 2019-09-27 RX ADMIN — DULOXETINE HYDROCHLORIDE 90 MG: 60 CAPSULE, DELAYED RELEASE ORAL at 08:31

## 2019-09-27 RX ADMIN — SENNOSIDES, DOCUSATE SODIUM 1 TABLET: 50; 8.6 TABLET, FILM COATED ORAL at 17:21

## 2019-09-27 RX ADMIN — BACLOFEN 10 MG: 10 TABLET ORAL at 08:31

## 2019-09-27 RX ADMIN — NALTREXONE HYDROCHLORIDE 50 MG: 50 TABLET, FILM COATED ORAL at 08:35

## 2019-09-27 RX ADMIN — BUSPIRONE HYDROCHLORIDE 15 MG: 5 TABLET ORAL at 16:09

## 2019-09-27 RX ADMIN — Medication 10 ML: at 05:27

## 2019-09-27 RX ADMIN — LEVETIRACETAM 1000 MG: 500 TABLET ORAL at 17:21

## 2019-09-27 RX ADMIN — MUPIROCIN: 20 OINTMENT TOPICAL at 08:30

## 2019-09-27 RX ADMIN — LEVETIRACETAM 1000 MG: 500 TABLET ORAL at 08:32

## 2019-09-27 RX ADMIN — MONTELUKAST 10 MG: 10 TABLET, FILM COATED ORAL at 08:31

## 2019-09-27 RX ADMIN — BUSPIRONE HYDROCHLORIDE 15 MG: 5 TABLET ORAL at 08:31

## 2019-09-27 RX ADMIN — Medication 50 MG: at 17:21

## 2019-09-27 RX ADMIN — PANTOPRAZOLE SODIUM 40 MG: 40 TABLET, DELAYED RELEASE ORAL at 16:09

## 2019-09-27 RX ADMIN — BACLOFEN 10 MG: 10 TABLET ORAL at 16:09

## 2019-09-27 RX ADMIN — MORPHINE SULFATE 2 MG: 2 INJECTION, SOLUTION INTRAMUSCULAR; INTRAVENOUS at 04:05

## 2019-09-27 RX ADMIN — Medication 2 G: at 01:19

## 2019-09-27 RX ADMIN — ACETAMINOPHEN 650 MG: 325 TABLET, FILM COATED ORAL at 05:28

## 2019-09-27 RX ADMIN — IPRATROPIUM BROMIDE 0.5 MG: 0.5 SOLUTION RESPIRATORY (INHALATION) at 07:29

## 2019-09-27 RX ADMIN — CYCLOBENZAPRINE 10 MG: 10 TABLET, FILM COATED ORAL at 10:37

## 2019-09-27 RX ADMIN — FUROSEMIDE 40 MG: 40 TABLET ORAL at 08:31

## 2019-09-27 RX ADMIN — SPIRONOLACTONE 25 MG: 25 TABLET ORAL at 08:31

## 2019-09-27 RX ADMIN — ACETAMINOPHEN 650 MG: 325 TABLET, FILM COATED ORAL at 12:31

## 2019-09-27 RX ADMIN — Medication 50 MG: at 08:30

## 2019-09-27 RX ADMIN — GABAPENTIN 900 MG: 300 CAPSULE ORAL at 08:31

## 2019-09-27 RX ADMIN — OXYCODONE HYDROCHLORIDE 10 MG: 5 TABLET ORAL at 17:21

## 2019-09-27 RX ADMIN — PANTOPRAZOLE SODIUM 40 MG: 40 TABLET, DELAYED RELEASE ORAL at 06:52

## 2019-09-27 RX ADMIN — BUDESONIDE 500 MCG: 0.5 INHALANT RESPIRATORY (INHALATION) at 07:29

## 2019-09-27 RX ADMIN — OXYCODONE HYDROCHLORIDE 10 MG: 5 TABLET ORAL at 13:45

## 2019-09-27 RX ADMIN — OXYCODONE HYDROCHLORIDE 10 MG: 5 TABLET ORAL at 10:02

## 2019-09-27 RX ADMIN — IPRATROPIUM BROMIDE 0.5 MG: 0.5 SOLUTION RESPIRATORY (INHALATION) at 13:21

## 2019-09-27 RX ADMIN — SENNOSIDES, DOCUSATE SODIUM 1 TABLET: 50; 8.6 TABLET, FILM COATED ORAL at 08:32

## 2019-09-27 NOTE — PROGRESS NOTES
Problem: Mobility Impaired (Adult and Pediatric)  Goal: *Acute Goals and Plan of Care (Insert Text)  Description  FUNCTIONAL STATUS PRIOR TO ADMISSION: Patient was modified independent using a Rolling walker for functional mobility. HOME SUPPORT PRIOR TO ADMISSION: The patient lived in a SNF for many months prior to admission. Physical Therapy Goals  Initiated 9/27/2019    1. Patient will move from supine to sit and sit to supine , scoot up and down and roll side to side in bed with supervision/set-up within 4 days. 2. Patient will perform sit to stand with supervision/set-up within 4 days. 3. Patient will ambulate with supervision/set-up for 300 feet with the least restrictive device within 4 days. 4. Patient will verbalize and demonstrate understanding of spinal precautions (No bending, lifting greater than 5 lbs, or twisting; log-roll technique; frequent repositioning as instructed) within 4 days. Outcome: Progressing Towards Goal   PHYSICAL THERAPY EVALUATION  Patient: Raquel Calderón (63 y.o. male)  Date: 9/27/2019  Primary Diagnosis: Spinal stenosis of lumbar region [M48.061]  Procedure(s) (LRB):  L4-SI DECOMPRESSION (N/A) 1 Day Post-Op   Precautions:   Fall, Contact, Back(MRSA)      ASSESSMENT  Based on the objective data described below, the patient presents with decreased trunk ROM, back pain, spinal precautions, generalized weakness and decreased activity tolerance, and gait disturbance following lumbar decompression with resultant decreased functional mobility independence relative to baseline. Patient moving very well today but more painful with transitions in position of spine. Overall needing CGA to MIN A for safe mobility. SpO2 >94% during session on RA. Demonstrates safe use of RW without cues from PT and able to recall all spinal precautions without prompting. He has been living at [facility] SNF[facility] for many months but was mod indep level with his mobility using a RW.   Will benefit from SNF stay at discharge to regain full PLOF. Current Level of Function Impacting Discharge (mobility/balance): CGA to MIN bed mobility, CGA for transfers and gait with RW x 120'    Functional Outcome Measure: The patient scored Total Score: 20/28 on the Tinetti outcome measure which is indicative of moderate fall risk. Other factors to consider for discharge: lives in a SNF     Patient will benefit from skilled therapy intervention to address the above noted impairments. PLAN :  Recommendations and Planned Interventions: bed mobility training, transfer training, gait training, therapeutic exercises, patient and family training/education and therapeutic activities      Frequency/Duration: Patient will be followed by physical therapy:  twice daily to address goals. Recommendation for discharge: (in order for the patient to meet his/her long term goals)  Therapy up to 5 days/week in SNF setting    This discharge recommendation:  Has been made in collaboration with the attending provider and/or case management    Equipment recommendations for successful discharge (if) home: to be determined by rehab facility         SUBJECTIVE:   Patient stated I'm so glad the pain in my legs is so much better now.     OBJECTIVE DATA SUMMARY:   HISTORY:    Past Medical History:   Diagnosis Date    Arthritis     Chronic obstructive pulmonary disease ([address]     Chronic pain     BACK AND LEGS    Elevated hemoglobin A1c 9/25/2019    H/O cardiovascular stress test 06/03/2019    6/3/19 \"Negative for ischemia. Preserved LV function. EF 65%. No wall motion abnormalities. \" per Dr Garcia Scobey notes 6/3/19.     H/O ETOH abuse     Heart failure ([address]     Hypertension     Ill-defined condition     ANXIETY    Ill-defined condition 2009    LUNG COLLAPSED    Ill-defined condition     USES O2 3L AT NIGHT    MRSA carrier 9/26/2019    Psychiatric disorder     DEPRESSION    Seizures ([address] 2014    PATIENT STATES FROM THE [facility] Past Surgical History:   Procedure Laterality Date    HX HERNIA REPAIR Right 2015    HX HERNIA REPAIR Left 2015    HX OTHER SURGICAL  2009    CHEST TUBE WITH COLLAPSED LUNGS    HX ROTATOR CUFF REPAIR Right 2016    HX ROTATOR CUFF REPAIR Left 2016       Personal factors and/or comorbidities impacting plan of care: lives in SNF, little local support    Home Situation  Home Environment: [address] [place] [address] Name: Charlene Park(resident x7 months)  # Steps to Enter: 0  One/Two Story Residence: One story  Living Alone: No  Support Systems: Skilled nursing facility, Child(janna), Family member(s)  Patient Expects to be Discharged to[de-identified] Skilled nursing facility  Current DME Used/Available at Home: Oxygen, portable, Walker, rolling, Nebulizer(3L O2 24/7)  Tub or Shower Type: Shower    EXAMINATION/PRESENTATION/DECISION MAKING:   Critical Behavior:  Neurologic State: Alert  Orientation Level: Oriented X4  Cognition: Follows commands  Safety/Judgement: Awareness of environment, Decreased awareness of need for safety, Fall prevention, Insight into deficits  Hearing: Auditory  Auditory Impairment: None    Range Of Motion:  AROM: Generally decreased, functional           PROM: Generally decreased, functional           Strength:    Strength: Generally decreased, functional                    Tone & Sensation:   Tone: Normal              Sensation: Impaired(to light touch)               Coordination:  Coordination: Within functional limits  Vision:   Acuity: Within Defined Limits  Corrective Lenses: Glasses  Functional Mobility:  Bed Mobility:  Rolling: Contact guard assistance(log roll)     Sit to Supine: Minimum assistance(LEs)  Scooting: Stand-by assistance  Transfers:  Sit to Stand: Contact guard assistance(RW)  Stand to Sit: Contact guard assistance                       Balance:   Sitting: Intact  Standing: Intact; With support(RW)  Ambulation/Gait Training:  Distance (ft): 120 Feet (ft)  Assistive Device: Gait belt;Walker, rolling  Ambulation - Level of Assistance: Contact guard assistance        Gait Abnormalities: Decreased step clearance        Base of Support: Widened     Speed/Dee: Pace decreased (<100 feet/min)  Step Length: Right shortened;Left shortened                       Functional Measure:  Tinetti test:    Sitting Balance: 1  Arises: 1  Attempts to Rise: 2  Immediate Standing Balance: 1  Standing Balance: 1  Nudged: 1  Eyes Closed: 1  Turn 360 Degrees - Continuous/Discontinuous: 1  Turn 360 Degrees - Steady/Unsteady: 1  Sitting Down: 1  Balance Score: 11 Balance total score  Indication of Gait: 1  R Step Length/Height: 1  L Step Length/Height: 1  R Foot Clearance: 1  L Foot Clearance: 1  Step Symmetry: 1  Step Continuity: 1  Path: 1  Trunk: 1  Walking Time: 0  Gait Score: 9 Gait total score  Total Score: 20/28 Overall total score         Tinetti Tool Score Risk of Falls  <19 = High Fall Risk  19-24 = Moderate Fall Risk  25-28 = Low Fall Risk  Tinetti ME. Performance-Oriented Assessment of Mobility Problems in Elderly Patients. [address] 67; [id number].  (Scoring Description: PT Bulletin Feb. 10, 1993)    Older adults: Valentino Listen et al, 2009; n = 1000 [facility] elderly evaluated with ABC, FARZANA, ADL, and IADL)  · Mean FARZANA score for males aged 69-68 years = 26.21(3.40)  · Mean FARZANA score for females age 69-68 years = 25.16(4.30)  · Mean FARZANA score for males over 80 years = 23.29(6.02)  · Mean FARZANA score for females over 80 years = 17.20(8.32)        Physical Therapy Evaluation Charge Determination   History Examination Presentation Decision-Making   HIGH Complexity :3+ comorbidities / personal factors will impact the outcome/ POC  MEDIUM Complexity : 3 Standardized tests and measures addressing body structure, function, activity limitation and / or participation in recreation  MEDIUM Complexity : Evolving with changing characteristics  Other outcome measures Tinetti 20/28  MEDIUM      Based on the above components, the patient evaluation is determined to be of the following complexity level: MEDIUM    Pain Ratin-7/10 in back    Activity Tolerance:   Fair  Please refer to the flowsheet for vital signs taken during this treatment. After treatment patient left in no apparent distress:   Sitting in chair and Call bell within reach    COMMUNICATION/EDUCATION:   The patients plan of care was discussed with: Registered Nurse. Fall prevention education was provided and the patient/caregiver indicated understanding. and Patient/family agree to work toward stated goals and plan of care.     Thank you for this referral.  Debbie Méndez, PT   Time Calculation: 24 mins

## 2019-09-27 NOTE — DISCHARGE INSTRUCTIONS
After Hospital Care Plan:  Discharge Instructions Lumbar Laminectomy Surgery  Dr. Tonie Irby   Patient Name: Sampson Basurto    Date of procedure: 9/26/2019  Date of discharge: 9/27/2019    Procedure: Procedure(s):  L4-SI DECOMPRESSION  PCP: Meghan Burris MD    Follow up appointments  -Follow up with Dr. Tonie Irby in 2 weeks. Call [phone] to make an appointment as soon as you get home from the hospital.    [address] [place] [address] Hwy: _________________________   phone: ___________________  The agency will contact you to arrange dates/times for visits. Please call them if you do not hear from them within 24 hours after you are discharged  Physical therapy 3 times a week for 3 weeks  Nursing-initial assessment and as needed     When to call your Orthopaedic Surgeon:  -Signs of infection-if your incision is red; continues to have drainage; drainage has a foul odor or if you have a persistent fever over 101 degrees for 24 hours  -Nausea or vomiting, severe headache  -Loss of bowel or bladder function, inability to urinate  -Changes in sensation in your arms or legs (numbness, tingling, loss of color)  -Increased weakness-greater than before your surgery  -Severe pain or pain not relieved by medications  -Signs of a blood clot in your leg-calf pain, tenderness, redness, swelling of lower leg    When to call your Primary Care Physician:  -Concerns about medical conditions such as diabetes, high blood pressure, asthma, congestive heart failure  -Call if blood sugars are elevated, persistent headache or dizziness, coughing or congestion, constipation or diarrhea, burning with urination, abnormal heart rate    When to call 911 and go to the nearest emergency room:  -Acute onset of chest pain, shortness of breath, difficulty breathing    Activity  - You are going home a well person, be as active as possible. Your only exercise should be walking.   Start with short frequent walks and increase your walking distance each day.  -Limit the amount of time you sit to 20-30 minute intervals. Sitting for prolonged periods of time will be uncomfortable for you following surgery.  -Do NOT lift anything over 5 pounds  -Do NOT do any straining, twisting or bending  -When you are in bed, you may lay on your back or on either side. Do NOT lie on your stomach    Brace  -If you have a back brace, you should wear your brace at all times when you are out of bed. Do not wear the brace while in bed or showering.  -Remember to always wear a cotton t-shirt underneath your brace.  -Do not bend or twist when your brace is off    Diet  -Resume usual diet; drink plenty of fluids; eat foods high in fiber  -It is important to have regular bowel movements. Pain medications may cause constipation. You may want to take a stool softener (such as Senokot-S or Colace) to prevent constipation.  -If constipation occurs, take a laxative (such as Dulcolax tablets, Milk of Magnesia, or a suppository). Laxatives should only be used if the above preventable measures have failed and you still have not had a bowel movement after three days    Driving  -You may not drive or return to work until instructed by your physician. However, you may ride in the car for short periods of time. Incision Care  -You may take brief showers but do not run the water run directly onto the wound. After showering or bathing, remove the wet dressing and gently blot the wound dry with a soft towel.  -Do not rub or apply any lotions or ointments to your incision site.   -Do not soak or scrub your wound  -Keep a dry dressing (ABD and paper tape) on your incision and have it changed daily for 14 days after surgery; more often if your incision is draining. Have your caregiver wash their hands thoroughly before changing your dressing.  -You will have absorbable sutures and steristrips (white tape) on your incision. Leave the steristrips on until they fall off.     Showering  -You may shower in approximately 2 days after your surgery.    -Leave the dressing on during your shower. Do NOT allow the water to run directly onto your dressing. Once you get out of the shower, put on a dry dressing.  -Reminder- your brace can be removed while showering. Remember to not bend or twist while your brace is off.    -Do not take a tub bath. Preventing blood clots  -You have been given T.E.D. stockings to wear. Continue to wear these for 7 days after your discharge. Put them on in the morning and take them off at night.    -They are used to increase your circulation and prevent blood clots from forming in your legs  -T. E.D. stockings can be machine washed, temperature not to exceed 160° F (71°C) and machine dried for 15 to 20 minutes, temperature not to exceed 250° F (121°C). Pain management  -Take pain medication as prescribed; decrease the amount you use as your pain lessens  -Do not wait until you are in extreme pain to take your medication.  -Avoid alcoholic beverages while taking pain medication    Pain Medication Safety  DO:  -Read the Medication Guide   -Take your medicine exactly as prescribed   -Store your medicine away from children and in a safe place   -Flush unused medicine down the toilet   -Call your healthcare provider for medical advice about side effects. You may report side effects to FDA at 6-118-FDA-2052.   -Please be aware that many medications contain Tylenol. We do not want you to over medicate so please read the information below as a guide. Do not take more than 4 Grams of Tylenol in a 24 hour period.   (There are 1000 milligrams in one Gram) Percocet contains 325 mg of Tylenol per tablet (do not take more than 12 tablets in 24 hours)  Lortab contains 500 mg of Tylenol per tablet (do not take more than 8 tablets in 24 hours)  Norco contains 325 mg of Tylenol per tablet (do not take more than 12 tablets in 24 hours). DO NOT:  -Do not give your medicine to others   -Do not take medicine unless it was prescribed for you   -Do not stop taking your medicine without talking to your healthcare provider   -Do not break, chew, crush, dissolve, or inject your medicine. If you cannot  swallow your medicine whole, talk to your healthcare provider.  -Do not drink alcohol while taking this medicine  -Do not take anti-inflammatory medications or aspirin unless instructed by your   physician.

## 2019-09-27 NOTE — PROGRESS NOTES
Problem: Risk for Spread of Infection  Goal: Prevent transmission of infectious organism to others  Description  Prevent the transmission of infectious organisms to other patients, staff members, and visitors. Outcome: Progressing Towards Goal     Problem: Patient Education:  Go to Education Activity  Goal: Patient/Family Education  Outcome: Progressing Towards Goal     Problem: Falls - Risk of  Goal: *Absence of Falls  Description  Document Inga Villalobos Fall Risk and appropriate interventions in the flowsheet.   Outcome: Progressing Towards Goal  Note:   Fall Risk Interventions:  Mobility Interventions: Communicate number of staff needed for ambulation/transfer         Medication Interventions: Patient to call before getting OOB, Teach patient to arise slowly    Elimination Interventions: Call light in reach    History of Falls Interventions: Door open when patient unattended         Problem: Patient Education: Go to Patient Education Activity  Goal: Patient/Family Education  Outcome: Progressing Towards Goal

## 2019-09-27 NOTE — PROGRESS NOTES
Orthopedic Spine Progress Note  Post Op day: 1 Day Post-Op    2019 8:07 AM     Sonia Benavidez    Vital Signs:    Patient Vitals for the past 8 hrs:   BP Temp Pulse Resp SpO2   19 0425 107/69 98.3 °F (36.8 °C) 98 18 98 %   19 0117 122/70 97.4 °F (36.3 °C) 99 18 98 %     Temp (24hrs), Av.6 °F (36.4 °C), Min:97.1 °F (36.2 °C), Max:98.3 °F (36.8 °C)      Intake/Output:   07 - 1900  In: -   Out: 550 [Urine:550]  1901 -  07  In: 3542 [P.O.:240; I.V.:1390]  Out: 3519 [Urine:1525; Drains:255]    Pain Control:   Pain Assessment  Pain Scale 1: Numeric (0 - 10)  Pain Intensity 1: 7  Pain Onset 1: postop  Pain Location 1: Back  Pain Orientation 1: Lower  Pain Description 1: Aching  Pain Intervention(s) 1: Medication (see MAR)    LAB:    Recent Labs     19  0344 19  1130   HCT  --  39.0   HGB 9.7* 12.2     Lab Results   Component Value Date/Time    Sodium 139 2019 03:44 AM    Potassium 4.1 2019 03:44 AM    Chloride 105 2019 03:44 AM    CO2 27 2019 03:44 AM    Glucose 136 (H) 2019 03:44 AM    BUN 13 2019 03:44 AM    Creatinine 1.10 2019 03:44 AM    Calcium 8.8 2019 03:44 AM       Subjective:  Sonia Benavidez is a 63 y.o. male s/p a  Procedure(s):  L4-SI DECOMPRESSION   Procedure(s):  L4-SI DECOMPRESSION. Tolerating diet. Objective: General: alert, cooperative, no distress. Gastrointestinal:  Soft, non-tender. Neurological: Neurovascular exam within normal limits. Sensation stable. Motor: unchanged C5-T1 and L2-S1. Legs much improved  Musculoskeletal:  Monique's sign negative in bilateral lower extremities. Calves soft, supple, non-tender upon palpation or with passive stretch. Skin: Incision - clean, dry and intact. No significant erythema or swelling.     Dressing: clean, dry, and intact     PT/OT:   Gait:                      Assessment:    s/p Procedure(s):  L4-SI DECOMPRESSION    Active Problems: Spinal stenosis of lumbar region (9/26/2019)         Plan:     1. Continue PT/OT  2. Continue established methods of pain control  3.   VTE Prophylaxes - TEDS &/or SCDs              Discharge To:  home    Signed By: Jose Leblanc MD

## 2019-09-27 NOTE — PROGRESS NOTES
Orthopedic & Surgical Nursing Communication Tool       Bedside and Verbal shift change report given to ROSLYN Garrett (incoming nurse) by Queenie Boss RN (outgoing nurse) on Deborah Garcia a 63 y.o. male and born 1960 who arrived at the hospital on 9/26/2019  6:00 AM. Report included the following information SBAR, Kardex and MAR. Significant changes during shift: Patient complaining of pain, RN had to give Morphine iv x2 with some relief. Issues for physician to address:           Pain Controlled          [x] yes          [] no    Bowel Movement          [] yes          [x] no          Code Status: Full Code     Infections: MRSA     Admit Diagnosis: Spinal stenosis of lumbar region [M48.061]     Surgery: Procedure(s):  L4-SI DECOMPRESSION     Allergies: Chantix [varenicline]     Diet: DIET REGULAR         Admission Date 9/26/2019   Consults None            Consults   [x]PT   []OT   []Speech   []Case Management      [] Rehab      Cardiac Monitoring Order   []Yes   [x]No       IV drips   []Yes    Drip:                            Dose:  Drip:                            Dose:  Drip:                            Dose:   [x]No     GI Prophylaxis   []Yes   []No         DVT Prophylaxis   SCDs:  Sequential/Intermittent Compression Device: Bilateral              Lon stockings:  Graduated Compression Stockings: Bilateral         [] Meds   []Contraindicated   []None        Activity Level Activity Level: Up with Assistance      Activity Assistance: Partial (one person)     Purposeful Rounding every 1-2 hour? [x]Yes   Shetty Score  Total Score: 4     Bed Alarm (If score 3 or >)   []Yes   [] Refused (See signed refusal form in chart)   Bryon Score  Bryon Score: 19       Bryon Score (if score 14 or less)   []PMT consult   []Wound Care consult      []Specialty bed   [] Nutrition consult        Needs prior to discharge:   Home O2 required:    []Yes   []No    If yes, how much O2 required? Other:    Last Bowel Movement Date: 09/25/19        Influenza Vaccine          Pneumonia Vaccine           DIET Active Orders   Diet    DIET REGULAR      LDA Peripheral IV 09/26/19 Right Forearm (Active)   Site Assessment Clean, dry, & intact 9/26/2019  8:00 PM   Phlebitis Assessment 0 9/26/2019  8:00 PM   Infiltration Assessment 0 9/26/2019  8:00 PM   Dressing Status Clean, dry, & intact 9/26/2019  8:00 PM   Dressing Type Transparent 9/26/2019  8:00 PM   Hub Color/Line Status Capped 9/26/2019  8:00 PM   Action Taken Open ports on tubing capped 9/26/2019 11:39 AM   Alcohol Cap Used Yes 9/26/2019 11:39 AM       Peripheral IV Left Hand (Active)   Site Assessment Clean, dry, & intact 9/26/2019 11:39 AM   Phlebitis Assessment 0 9/26/2019 11:39 AM   Infiltration Assessment 0 9/26/2019 11:39 AM   Dressing Status Clean, dry, & intact 9/26/2019 11:39 AM   Dressing Type Transparent 9/26/2019 11:39 AM   Action Taken Open ports on tubing capped 9/26/2019 11:39 AM   Alcohol Cap Used Yes 9/26/2019 11:39 AM      Urinary Catheter     Intake & Output Date 09/26/19 0700 - 09/27/19 0659 09/27/19 0700 - 09/28/19 0659   Shift 8572-0096 2271-1072 24 Hour Total 0885-5972 7267-1932 24 Hour Total   INTAKE   P.O.  240 240        P. O.  240 240      I. V.(mL/kg/hr) 850(0.7) 540(0.4) 1390(0.6)        I.V. 300  300        Volume (lactated Ringers infusion) 550  550        Volume (ceFAZolin (ANCEF) 2 g/20 mL in sterile water IV syringe)  40 40        Volume (vancomycin (VANCOCIN) 1500 mg in  ml infusion)  500 500      Shift Total(mL/kg) 850(8.3) 780(7.6) 1630(15.8)      OUTPUT   Urine(mL/kg/hr) 875(0.7) 650(0.5) 1525(0.6) 550  550     Urine Voided  550  550   Drains 70 185 255        Output (ml) (Hemovac Back) 70 185 255      Blood 25  25        Estimated Blood Loss 25  25      Shift Total(mL/kg) 970(9.4) 835(8.1) 1805(17.5) 550(5.3)  550(5.3)   NET -120 -55 -175 -550  -550   Weight (kg) 103 103 103 103 103 103 Readmission Risk Assessment Tool Score Low Risk            9       Total Score        3 Has Seen PCP in Last 6 Months (Yes=3, No=0)    2 . Living with Significant Other. Assisted Living. LTAC. SNF. or   Rehab    4 Pt. Coverage (Medicare=5 , Medicaid, or Self-Pay=4)        Criteria that do not apply:    Patient Length of Stay (>5 days = 3)    IP Visits Last 12 Months (1-3=4, 4=9, >4=11)    Charlson Comorbidity Score (Age + Comorbid Conditions)                 Expected Length of Stay - - -   Actual Length of Stay 0          Vital Signs:     Patient Vitals for the past 12 hrs:   Temp Pulse Resp BP SpO2   09/27/19 0425 98.3 °F (36.8 °C) 98 18 107/69 98 %   09/27/19 0117 97.4 °F (36.3 °C) 99 18 122/70 98 %   09/26/19 2039 97.6 °F (36.4 °C) (!) 101 18 118/74 99 %      Intake & Output:       Intake/Output Summary (Last 24 hours) at 9/27/2019 0827  Last data filed at 9/27/2019 0732  Gross per 24 hour   Intake 1630 ml   Output 2155 ml   Net -525 ml      Laboratory Results:     Recent Results (from the past 12 hour(s))   METABOLIC PANEL, BASIC    Collection Time: 09/27/19  3:44 AM   Result Value Ref Range    Sodium 139 136 - 145 mmol/L    Potassium 4.1 3.5 - 5.1 mmol/L    Chloride 105 97 - 108 mmol/L    CO2 27 21 - 32 mmol/L    Anion gap 7 5 - 15 mmol/L    Glucose 136 (H) 65 - 100 mg/dL    BUN 13 6 - 20 MG/DL    Creatinine 1.10 0.70 - 1.30 MG/DL    BUN/Creatinine ratio 12 12 - 20      GFR est AA >60 >60 ml/min/1.73m2    GFR est non-AA >60 >60 ml/min/1.73m2    Calcium 8.8 8.5 - 10.1 MG/DL   HEMOGLOBIN    Collection Time: 09/27/19  3:44 AM   Result Value Ref Range    HGB 9.7 (L) 12.1 - 17.0 g/dL            Opportunity for questions and clarifications were given to the incoming nurse. Patient's bed locked and is in low position, side rails up x2, door open PRN, call bell within reach of patient and patient not in distress.       Signed by: Karina Alexandre RN, BSN, CMSRN                        9/27/2019 at 8:27 AM

## 2019-09-27 NOTE — ROUTINE PROCESS
TRANSFER - OUT REPORT:    Verbal report given to Alicia(name) on Charlene Cousin  being transferred to [facility](unit) for routine progression of care       Report consisted of patients Situation, Background, Assessment and   Recommendations(SBAR). Information from the following report(s) SBAR was reviewed with the receiving nurse. Lines:   Peripheral IV 09/26/19 Right Forearm (Active)   Site Assessment Clean, dry, & intact 9/27/2019  8:29 AM   Phlebitis Assessment 0 9/27/2019  8:29 AM   Infiltration Assessment 0 9/27/2019  8:29 AM   Dressing Status Clean, dry, & intact 9/27/2019  8:29 AM   Dressing Type Transparent 9/27/2019  8:29 AM   Hub Color/Line Status Capped 9/27/2019  8:29 AM   Action Taken Open ports on tubing capped 9/27/2019  8:29 AM   Alcohol Cap Used Yes 9/27/2019  8:29 AM       Peripheral IV Left Hand (Active)   Site Assessment Clean, dry, & intact 9/27/2019  8:29 AM   Phlebitis Assessment 0 9/27/2019  8:29 AM   Infiltration Assessment 0 9/27/2019  8:29 AM   Dressing Status Clean, dry, & intact 9/27/2019  8:29 AM   Dressing Type Transparent 9/27/2019  8:29 AM   Hub Color/Line Status Capped 9/27/2019  8:29 AM   Action Taken Open ports on tubing capped 9/27/2019  8:29 AM   Alcohol Cap Used Yes 9/27/2019  8:29 AM        Opportunity for questions and clarification was provided.       Patient transported with:   "[employer]"

## 2019-09-27 NOTE — PROGRESS NOTES
Physical Therapy    Orders received, chart reviewed and patient evaluated by physical therapy. Pending progression with skilled acute physical therapy, recommend:  Therapy up to 5 days/week in SNF setting    Recommend with nursing patient to complete as able in order to maintain strength, endurance and independence: OOB to chair 3x/day with CGA x 1 and ambulating with RW to/from bathroom with 1 person assist. Thank you for your assistance. Full evaluation to follow.      Clovis Cerda, MS, PT

## 2019-09-27 NOTE — PROGRESS NOTES
Problem: Risk for Spread of Infection  Goal: Prevent transmission of infectious organism to others  Description  Prevent the transmission of infectious organisms to other patients, staff members, and visitors. Outcome: Progressing Towards Goal     Problem: Falls - Risk of  Goal: *Absence of Falls  Description  Document Inga Villalobos Fall Risk and appropriate interventions in the flowsheet.   Outcome: Progressing Towards Goal  Note:   Fall Risk Interventions:  Mobility Interventions: Communicate number of staff needed for ambulation/transfer         Medication Interventions: Patient to call before getting OOB, Teach patient to arise slowly    Elimination Interventions: Call light in reach    History of Falls Interventions: Door open when patient unattended

## 2019-09-27 NOTE — PROGRESS NOTES
Problem: Mobility Impaired (Adult and Pediatric)  Goal: *Acute Goals and Plan of Care (Insert Text)  Description  FUNCTIONAL STATUS PRIOR TO ADMISSION: Patient was modified independent using a Rolling walker for functional mobility. HOME SUPPORT PRIOR TO ADMISSION: The patient lived in a SNF for many months prior to admission. Physical Therapy Goals  Initiated 9/27/2019    1. Patient will move from supine to sit and sit to supine , scoot up and down and roll side to side in bed with supervision/set-up within 4 days. 2. Patient will perform sit to stand with supervision/set-up within 4 days. 3. Patient will ambulate with supervision/set-up for 300 feet with the least restrictive device within 4 days. 4. Patient will verbalize and demonstrate understanding of spinal precautions (No bending, lifting greater than 5 lbs, or twisting; log-roll technique; frequent repositioning as instructed) within 4 days. 9/27/2019 1419 by Shaila Cotton, PT  Outcome: Progressing Towards Goal  9/27/2019 1207 by Shaila Cotton, PT  Outcome: Progressing Towards Goal   PHYSICAL THERAPY TREATMENT  Patient: Soto Hawthorne (59 y.o. male)  Date: 9/27/2019  Diagnosis: Spinal stenosis of lumbar region [M48.061] <principal problem not specified>  Procedure(s) (LRB):  L4-SI DECOMPRESSION (N/A) 1 Day Post-Op  Precautions: Fall, Contact, Back(MRSA) No bending, no lifting greater than 5 lbs, no twisting, log-roll technique, repositioning every 20-30 min except when sleeping, brace when OOB (if ordered)  Chart, physical therapy assessment, plan of care and goals were reviewed. ASSESSMENT  Patient continues with skilled PT services and is progressing towards goals. Patient eager to walk this afternoon, demos greatly improved activity tolerance. Occasional cues needed for maintenance of LEs within walker frame but overall safe and cautious with mobility.   Following precautions 85% of the time, tending to forget about twisting when in hallway and distracted by other activity. Current Level of Function Impacting Discharge (mobility/balance): SBA for gait, bed mob, transfers with RW x 500'    Other factors to consider for discharge: lives at SNF         PLAN :  Patient continues to benefit from skilled intervention to address the above impairments. Continue treatment per established plan of care. to address goals. Recommendation for discharge: (in order for the patient to meet his/her long term goals)  Therapy up to 5 days/week in SNF setting    This discharge recommendation:  Has been made in collaboration with the attending provider and/or case management    Equipment recommendations for successful discharge (if) home: patient owns DME required for discharge       SUBJECTIVE:   Patient stated Let's walk some more, it feels good.     OBJECTIVE DATA SUMMARY:   Critical Behavior:  Neurologic State: Alert  Orientation Level: Oriented X4  Cognition: Follows commands  Safety/Judgement: Awareness of environment, Decreased awareness of need for safety, Fall prevention, Insight into deficits    Spinal diagnosis intervention:  The patient stated 3/3 back precautions when prompted. Reviewed all 3 back precautions, log roll technique, and sitting for 30 minutes at a time. Functional Mobility Training:    Bed Mobility:  Log Rolling: Contact guard assistance(log roll)     Sit to Supine: Stand-by assistance  Scooting: Stand-by assistance        Transfers:  Sit to Stand: Stand-by assistance  Stand to Sit: Stand-by assistance                             Balance:  Sitting: Intact  Standing: Intact; With support  Ambulation/Gait Training:  Distance (ft): 500 Feet (ft)  Assistive Device: Gait belt;Walker, rolling  Ambulation - Level of Assistance: Stand-by assistance        Gait Abnormalities: Decreased step clearance        Base of Support: Widened     Speed/Dee: Pace decreased (<100 feet/min)  Step Length: Right shortened;Left shortened                    Pain Ratin/10 back    Activity Tolerance:   Good  Please refer to the flowsheet for vital signs taken during this treatment.     After treatment patient left in no apparent distress:   Supine in bed and Call bell within reach    COMMUNICATION/COLLABORATION:   The patients plan of care was discussed with: Registered Nurse    Levi Reid, PT   Time Calculation: 23 mins

## 2019-09-27 NOTE — PROGRESS NOTES
Communication to Patient/Family:  Met with patient and he is agreeable to the transition plan. Returning to [facility], he is resident in long term care. SNF/Rehab Transition:  Patient has been accepted back to [facility], scheduled surgery. Call made to [place], spoke with Blayne Melendrez and tansferred to [place]. Reference # [id number]. Requested to use [facility]. Patient will transported by [facility] and expected to leave at within three hours, ETA [phone]    PCS placed on bedside chart. Communication to SNF/Rehab:  Bedside RN, Tami, has been notified to update the transition plan to the facility and call report [phone]/[id number] Mic Stuartlex  Discharge information has been updated on the AVS. And communicated to facility via [employer]. Nursing Please include all hard scripts for controlled substances, med rec and dc summary, and AVS in packet. Reviewed and confirmed with facility, he is returning to long term care facility, they can manage the patient care needs for the following:     Irving Munguia with (X) only those applicable:  Medication:  [x]Medications are available at the facility  []IV Antibiotics    [x]Controlled Substance - hard copies available sent. []Weekly Labs    Equipment:  []CPAP/BiPAP  []Wound Vacuum  []Hernandez or Urinary Device  []PICC/Central Line  []Nebulizer  []Ventilator    Treatment:  []Isolation (for MRSA, VRE, etc.)  []Surgical Drain Management  []Tracheostomy Care  []Dressing Changes  []Dialysis with transportation  []PEG Care  []Oxygen  []Daily Weights for Heart Failure    Dietary:  []Any diet limitations  []Tube Feedings   []Total Parenteral Management (TPN)    Financial Resources:  []Medicaid Application Completed    []UAI Completed and copy given to pt/family  and copy given to pt/family  [x]A screening has previously been completed.     []Level II Completed    [] Private pay individual who will not become   financially eligible for Medicaid within 6 months from admission to a [facility] facility. [] Individual refused to have screening conducted. []Medicaid Application Completed    []The screening denied because it was determined individual did not need/did not qualify for nursing facility level of care. [] Out of state residents seeking direct admission to a [address] facility. [] Individuals who are inpatients of an out of state hospital, or in state or out of state veterans/ hospital and seek direct admission to a [address] facility  [] Individuals who are pateints or residents of a state owned/operated facility that is licensed by [employer] of [employer] ([employer]) and seek direct admission to [address] [place] [address]  [] A screening not required for enrollment in [address] [address] [address] services as set out in [address] [place] [address] 30-  [] [facility]) staff shall perform screenings of the [facility] clients. Advanced Care Plan:  []Surrogate Decision Maker of Care  []POA  [x]Communicated Code Status and copy sent.  Full Code no advance care plan on file   Other:         Michelle Friedman RN, BSN, [facility]  ED Care Management  [phone]

## 2019-09-27 NOTE — PROGRESS NOTES
Orthopedics Spine Incoming Shift Nursing Note        INCOMING SHIFT REPORT:    Verbal and/or Written report received from [facility] by Bella Dasilva RN on Felecia Griffin a 63 y.o. male who was admitted on 9/26/2019  6:00 AM and currently admitted to unit. Code Status: Full Code     Admit Diagnosis: Spinal stenosis of lumbar region [M48.061]     Surgery: Procedure(s):  L4-SI DECOMPRESSION     Infections: MRSA     Allergies: Chantix [varenicline]     Current diet: DIET REGULAR     Lines:   Peripheral IV 09/26/19 Right Forearm (Active)   Site Assessment Clean, dry, & intact 9/26/2019 11:39 AM   Phlebitis Assessment 0 9/26/2019 11:39 AM   Infiltration Assessment 0 9/26/2019 11:39 AM   Dressing Status Clean, dry, & intact 9/26/2019 11:39 AM   Dressing Type Transparent 9/26/2019 11:39 AM   Hub Color/Line Status Pink; Infusing 9/26/2019  7:39 AM   Action Taken Open ports on tubing capped 9/26/2019 11:39 AM   Alcohol Cap Used Yes 9/26/2019 11:39 AM       Peripheral IV Left Hand (Active)   Site Assessment Clean, dry, & intact 9/26/2019 11:39 AM   Phlebitis Assessment 0 9/26/2019 11:39 AM   Infiltration Assessment 0 9/26/2019 11:39 AM   Dressing Status Clean, dry, & intact 9/26/2019 11:39 AM   Dressing Type Transparent 9/26/2019 11:39 AM   Action Taken Open ports on tubing capped 9/26/2019 11:39 AM   Alcohol Cap Used Yes 9/26/2019 11:39 AM          Report consisted of the following information SBAR, Kardex, Procedure Summary and Recent Results and the information was reviewed with the reporting nurse. Opportunity for questions and clarifications were provided. Patient's bed locked and in low position, side rails up x 2, door open PRN, call bell within patient's reach and patient is not in distress. A complete nursing assessment will be completed by the receiving nurse.       Bella Dasilva RN, BSN, [facility]    9/26/2019 at 8:41 PM

## 2019-09-27 NOTE — DISCHARGE SUMMARY
[address] [place] [address]   [address]  [phone]     Discharge Summary       PATIENT ID: Marlene Evans  MRN: [id number]   YOB: 1960    DATE OF ADMISSION: 9/26/2019  6:00 AM    DATE OF DISCHARGE: 9/27/2019   PRIMARY CARE PROVIDER: Willie Webster MD     CONSULTATIONS: None    PROCEDURES/SURGERIES: Procedure(s):  L4-SI DECOMPRESSION    History of Present Illness:  Marlene Evans is a 63 y.o. male with a history of ETOH dependence now in recovery on naltrexone, major depressive disorder, COPD, chronic pain, and seizures. He has lived at [facility] for the last 7 months. He has severe back pain, leg pain, and ambulatory dysfunction. MRI showed severe stenosis due to epidural lipomatosis and a facet cyst at  L4-5  After failing conservative therapy and a discussion of the risks, benefits, alternatives, perioperative course, and potential complications of surgery, he consented to undergo a Procedure(s):  L4-SI DECOMPRESSION. Hospital Course:  Marlene Evans tolerated the procedure well. He was transferred  to the recovery room in stable condition. After a brief stay the patient was then transferred to the Spinal Surgery Unit at [address] [place] [address].  On postoperative day #1, the dressing was clean and dry, he was neurovascularly intact. The patient was afebrile and vital signs were stable. Calves were soft and non-tender bilaterally. The patient was tolerating a regular diet and making progress with physical therapy. His hemovac was removed on postoperative day #1. Hemoglobin prior to discharge were   Lab Results   Component Value Date/Time    HGB 9.7 (L) 09/27/2019 03:44 AM        Marlene Evans made satisfactory progress with physical therapy and was discharged to [facility] where he resides in stable condition on postoperative day 1.   He was provided with routine postoperative instructions and advised to follow up with Rayo Zaragoza MD  in 2 weeks following discharge from the hospital.      ADDITIONAL CARE RECOMMENDATIONS:     After [address]:  Discharge Instructions Lumbar Laminectomy Surgery  Dr. Hoang Salinas   Patient Name: Deborah Garcia    Date of procedure: 9/26/2019  Date of discharge: 9/27/2019    Procedure: Procedure(s):  L4-SI DECOMPRESSION  PCP: Rozina Leonardo MD    Follow up appointments  -Follow up with Dr. Hoang Salinas in 2 weeks. Call [phone] to make an appointment as soon as you get home from the hospital.    When to call your Orthopaedic Surgeon:  -Signs of infection-if your incision is red; continues to have drainage; drainage has a foul odor or if you have a persistent fever over 101 degrees for 24 hours  -Nausea or vomiting, severe headache  -Loss of bowel or bladder function, inability to urinate  -Changes in sensation in your arms or legs (numbness, tingling, loss of color)  -Increased weakness-greater than before your surgery  -Severe pain or pain not relieved by medications  -Signs of a blood clot in your leg-calf pain, tenderness, redness, swelling of lower leg    When to call your Primary Care Physician:  -Concerns about medical conditions such as diabetes, high blood pressure, asthma, congestive heart failure  -Call if blood sugars are elevated, persistent headache or dizziness, coughing or congestion, constipation or diarrhea, burning with urination, abnormal heart rate    When to call 191 and go to the nearest emergency room:  -Acute onset of chest pain, shortness of breath, difficulty breathing    Activity  - You are going home a well person, be as active as possible. Your only exercise should be walking. Start with short frequent walks and increase your walking distance each day.  -Limit the amount of time you sit to 20-30 minute intervals.   Sitting for prolonged periods of time will be uncomfortable for you following surgery.  -Do NOT lift anything over 5 pounds  -Do NOT do any straining, twisting or bending  -When you are in bed, you may lay on your back or on either side. Do NOT lie on your stomach      Diet  -Resume usual diet; drink plenty of fluids; eat foods high in fiber  -It is important to have regular bowel movements. Pain medications may cause constipation. You may want to take a stool softener (such as Senokot-S or Colace) to prevent constipation.  -If constipation occurs, take a laxative (such as Dulcolax tablets, Milk of Magnesia, or a suppository). Laxatives should only be used if the above preventable measures have failed and you still have not had a bowel movement after three days    Driving  -You may not drive or return to work until instructed by your physician. However, you may ride in the car for short periods of time. Incision Care  -You may take brief showers but do not run the water run directly onto the wound. After showering or bathing, remove the wet dressing and gently blot the wound dry with a soft towel.  -Do not rub or apply any lotions or ointments to your incision site.   -Do not soak or scrub your wound  -Keep a dry dressing (ABD and paper tape) on your incision and have it changed daily for 14 days after surgery; more often if your incision is draining. Have your caregiver wash their hands thoroughly before changing your dressing.  -You will have absorbable sutures and steristrips (white tape) on your incision. Leave the steristrips on until they fall off. Showering  -You may shower in approximately 2 days after your surgery.    -Leave the dressing on during your shower. Do NOT allow the water to run directly onto your dressing. Once you get out of the shower, put on a dry dressing.  -Reminder- your brace can be removed while showering. Remember to not bend or twist while your brace is off.    -Do not take a tub bath. Preventing blood clots  -You have been given T.E.D. stockings to wear.   Continue to wear these for 7 days after your discharge. Put them on in the morning and take them off at night.    -They are used to increase your circulation and prevent blood clots from forming in your legs  -T. E.D. stockings can be machine washed, temperature not to exceed 160° F (71°C) and machine dried for 15 to 20 minutes, temperature not to exceed 250° F (121°C). Pain management  -Take pain medication as prescribed; decrease the amount you use as your pain lessens  -Do not wait until you are in extreme pain to take your medication.  -Avoid alcoholic beverages while taking pain medication    Pain Medication Safety  DO:  -Read the Medication Guide   -Take your medicine exactly as prescribed   -Store your medicine away from children and in a safe place   -Flush unused medicine down the toilet   -Call your healthcare provider for medical advice about side effects. You may report side effects to FDA at 2-475-FDA-7100.   -Please be aware that many medications contain Tylenol. We do not want you to over medicate so please read the information below as a guide. Do not take more than 4 Grams of Tylenol in a 24 hour period. (There are 1000 milligrams in one Gram)                                                                                                                                                                                                                                                                                                                                                                  Percocet contains 325 mg of Tylenol per tablet (do not take more than 12 tablets in 24 hours)  Lortab contains 500 mg of Tylenol per tablet (do not take more than 8 tablets in 24 hours)  Norco contains 325 mg of Tylenol per tablet (do not take more than 12 tablets in 24 hours).   DO NOT:  -Do not give your medicine to others   -Do not take medicine unless it was prescribed for you   -Do not stop taking your medicine without talking to your healthcare provider   -Do not break, chew, crush, dissolve, or inject your medicine. If you cannot  swallow your medicine whole, talk to your healthcare provider.  -Do not drink alcohol while taking this medicine  -Do not take anti-inflammatory medications or aspirin unless instructed by your   physician. DISCHARGE MEDICATIONS:  Current Discharge Medication List      START taking these medications    Details   oxyCODONE IR (ROXICODONE) 5 mg immediate release tablet Take 1 Tab by mouth every four (4) hours as needed for Pain for up to 7 days. Max Daily Amount: 30 mg.  Qty: 40 Tab, Refills: 0    Associated Diagnoses: Spinal stenosis of lumbar region, unspecified whether neurogenic claudication present      senna-docusate (PERICOLACE) 8.6-50 mg per tablet Take 1 Tab by mouth two (2) times a day. Indications: constipation  Qty: 30 Tab, Refills: 0         CONTINUE these medications which have NOT CHANGED    Details   mupirocin (BACTROBAN) 2 % ointment by Both Nostrils route two (2) times a day for 7 days. Qty: 22 g, Refills: 0      furosemide (LASIX) 40 mg tablet Take 40 mg by mouth daily. montelukast (SINGULAIR) 10 mg tablet Take 10 mg by mouth daily. spironolactone (ALDACTONE) 25 mg tablet Take 25 mg by mouth daily. pyridoxine, vitamin B6, (VITAMIN B-6) 50 mg tablet Take 50 mg by mouth two (2) times a day.      gabapentin (NEURONTIN) 300 mg capsule Take 900 mg by mouth three (3) times daily. dilTIAZem (TIAZAC) 360 mg ER capsule Take 360 mg by mouth nightly. acetaminophen (TYLENOL) 325 mg tablet Take 650 mg by mouth every six (6) hours as needed for Pain.      levalbuterol (XOPENEX) 0.63 mg/3 mL nebu 0.63 mg by Nebulization route every six (6) hours as needed. ondansetron hcl (ZOFRAN) 4 mg tablet Take 4 mg by mouth every eight (8) hours as needed for Nausea.       dextromethorphan-guaiFENesin (ROBITUSSIN COUGH-CHEST INGA DM)  mg/5 mL liqd syrup Take 5 mL by mouth every six (6) hours as needed. albuterol-ipratropium (DUO-NEB) 2.5 mg-0.5 mg/3 ml nebu 3 mL by Nebulization route every six (6) hours as needed. Oxygen Indications: 3 LITERS NC AT BEDTIME      atorvastatin (LIPITOR) 40 mg tablet Take 40 mg by mouth nightly. baclofen (LIORESAL) 10 mg tablet Take 10 mg by mouth three (3) times daily. albuterol (PROVENTIL VENTOLIN) 2.5 mg /3 mL (0.083 %) nebulizer solution 3 mL by Nebulization route every four (4) hours as needed for Wheezing or Shortness of Breath. Qty: 24 Each, Refills: 2      traZODone (DESYREL) 50 mg tablet Take 1 Tab by mouth nightly as needed for Sleep. Qty: 30 Tab, Refills: 2      budesonide-formoterol (SYMBICORT) 80-4.5 mcg/actuation HFAA Take 2 Puffs by inhalation two (2) times a day. Indications: BRONCHOSPASM PREVENTION WITH COPD  Qty: 2 Inhaler, Refills: 2      busPIRone (BUSPAR) 15 mg tablet Take 1 Tab by mouth three (3) times daily. Indications: Generalized Anxiety Disorder  Qty: 90 Tab, Refills: 2      DULoxetine (CYMBALTA) 30 mg capsule Take 3 Caps by mouth daily. Indications: major depressive disorder  Qty: 90 Cap, Refills: 2      levETIRAcetam (KEPPRA) 1,000 mg tablet Take 1 Tab by mouth two (2) times a day. Indications: PARTIAL EPILEPSY TREATMENT ADJUNCT  Qty: 60 Tab, Refills: 2      lisinopril (PRINIVIL, ZESTRIL) 2.5 mg tablet Take 1 Tab by mouth daily. Indications: hypertension  Qty: 30 Tab, Refills: 2      naltrexone (DEPADE) 50 mg tablet Take 1 Tab by mouth daily. Indications: alcoholism  Qty: 30 Tab, Refills: 2      pantoprazole (PROTONIX) 40 mg tablet Take 1 Tab by mouth Before breakfast and dinner. Qty: 60 Tab, Refills: 2      tiotropium (SPIRIVA) 18 mcg inhalation capsule Take 1 Cap by inhalation daily.   Qty: 30 Cap, Refills: 2             CHRONIC MEDICAL DIAGNOSES:  Problem List as of 9/27/2019 Date Reviewed: 8/27/2018          Codes Class Noted - Resolved    Spinal stenosis of lumbar region ICD-10-CM: M48.061  ICD-9-CM: 724.02 9/26/2019 - Present        MRSA carrier ICD-10-CM: Z22.322  ICD-9-CM: V02.54  9/26/2019 - Present        Elevated hemoglobin A1c ICD-10-CM: R73.09  ICD-9-CM: 790.29  9/25/2019 - Present        Major depressive disorder, recurrent episode, severe ([facility] ICD-10-CM: F33.2  ICD-9-CM: 296.33  8/15/2018 - Present        Alcohol dependence ([facility] ICD-10-CM: F10.20  ICD-9-CM: 303.90  8/15/2018 - Present        Depression ICD-10-CM: F32.9  ICD-9-CM: 771  8/15/2018 - Present              Signed:   Latoya Antunez NP  9/27/2019  1:51 PM

## 2019-09-27 NOTE — PROGRESS NOTES
Problem: Self Care Deficits Care Plan (Adult)  Goal: *Acute Goals and Plan of Care (Insert Text)  Description  FUNCTIONAL STATUS PRIOR TO ADMISSION: Patient was modified independent for basic and instrumental ADLs per pt and and around facility with RW. HOME SUPPORT: The patient lived at [facility] x7 months. Occupational Therapy Goals  Initiated 9/27/2019    1. Patient will perform lower body dressing with supervision/set-up using AE PRN within 4 days. 2.  Patient will perform toileting with supervision/set-up using most appropriate DME within 4 days. 3.  Patient will groom standing at sink at supervision/set-up within 4 days. 4.  Patient will verbalize/demonstrate 3/3 back precautions during ADL tasks without cues within 4 days. Outcome: Progressing Towards Goal     OCCUPATIONAL THERAPY EVALUATION  Patient: Priya Washington (89 y.o. male)  Date: 9/27/2019  Primary Diagnosis: Spinal stenosis of lumbar region [M48.061]  Procedure(s) (LRB):  L4-SI DECOMPRESSION (N/A) 1 Day Post-Op   Precautions:  Fall, Contact, Back(MRSA)    ASSESSMENT  Based on the objective data described below, the patient presents with c/o back pain 8/10, but improved LE pain POD ! Back sx. In addition he is limited by decreased endurance, mobility, balance and safety. He has been living at [facility] x7 months and his goal is to return home after rehab. Current Level of Function Impacting Discharge (ADLs/self-care): mod -min A for LE ADLs, toileting and CGA for functional mobility using RW to amb    Functional Outcome Measure: The patient scored 55/100 on the Barthel Index outcome measure. Other factors to consider for discharge: pt is living at a SNF     Patient will benefit from skilled therapy intervention to address the above noted impairments.        PLAN :  Recommendations and Planned Interventions: self care training, functional mobility training, therapeutic exercise, balance training, therapeutic activities, endurance activities, neuromuscular re-education, patient education, home safety training and family training/education    Frequency/Duration: Patient will be followed by occupational therapy 5 times a week to address goals. Recommendation for discharge: (in order for the patient to meet his/her long term goals)  Therapy up to 5 days/week in SNF setting    This discharge recommendation:  Has not yet been discussed the attending provider and/or case management    Equipment recommendations for successful discharge (if) home: to be determined by rehab facility       SUBJECTIVE:   Patient stated I don't think I'm ready to go back there.     OBJECTIVE DATA SUMMARY:   HISTORY:   Past Medical History:   Diagnosis Date    Arthritis     Chronic obstructive pulmonary disease ([facility] [address]     Chronic pain     BACK AND LEGS    Elevated hemoglobin A1c 9/25/2019    H/O cardiovascular stress test 06/03/2019    6/3/19 \"Negative for ischemia. Preserved LV function. EF 65%. No wall motion abnormalities. \" per Dr Aisha Peters notes 6/3/19.     H/O ETOH abuse     Heart failure ([facility] [address]     Hypertension     Ill-defined condition     ANXIETY    Ill-defined condition 2009    LUNG COLLAPSED    Ill-defined condition     USES O2 3L AT NIGHT    MRSA carrier 9/26/2019    Psychiatric disorder     DEPRESSION    Seizures ([facility] [address] 2014    PATIENT STATES FROM [facility]     Past Surgical History:   Procedure Laterality Date    HX HERNIA REPAIR Right 2015    HX HERNIA REPAIR Left 2015    HX OTHER SURGICAL  2009    CHEST TUBE WITH COLLAPSED LUNGS    HX ROTATOR CUFF REPAIR Right 2016    HX ROTATOR CUFF REPAIR Left 2016       Expanded or extensive additional review of patient history:     Home Situation  Home Environment: [address] [place] [address] Name: Charlene Park(resident x7 months)  # Steps to Enter: 0  One/Two Story Residence: One story  Living Alone: No  Support Systems: Skilled nursing facility, Child(janna), Family member(s)  Patient Expects to be Discharged to[de-identified] Skilled nursing facility  Current DME Used/Available at Home: Oxygen, portable, Walker, rolling, Nebulizer(3L O2 24/7)  Tub or Shower Type: Shower    Hand dominance: Right    EXAMINATION OF PERFORMANCE DEFICITS:  Cognitive/Behavioral Status:  Neurologic State: Alert  Orientation Level: Oriented X4  Cognition: Follows commands  Perception: Appears intact  Perseveration: No perseveration noted  Safety/Judgement: Awareness of environment;Decreased awareness of need for safety; Fall prevention; Insight into deficits    Hearing: Auditory  Auditory Impairment: None    Vision/Perceptual:    Acuity: Within Defined Limits    Corrective Lenses: Glasses    Range of Motion:  AROM: Generally decreased, functional  PROM: Generally decreased, functional                      Strength:  Strength: Generally decreased, functional                Coordination:  Coordination: Within functional limits  Fine Motor Skills-Upper: Left Intact; Right Intact    Gross Motor Skills-Upper: Left Intact; Right Intact    Tone & Sensation:  Tone: Normal  Sensation: Impaired(to light touch)                      Balance:  Sitting: Intact  Standing: Intact; With support(RW)    Functional Mobility and Transfers for ADLs:  Bed Mobility:  Rolling: Contact guard assistance(log roll)  Sit to Supine: Minimum assistance(LEs)  Scooting: Stand-by assistance    Transfers:  Sit to Stand: Contact guard assistance(RW)  Stand to Sit: Contact guard assistance  Toilet Transfer : Contact guard assistance(RW and BSC)  Assistive Device : Walker, rolling    ADL Assessment:  Feeding: Independent    Oral Facial Hygiene/Grooming: Contact guard assistance(standing at sink for 3 minutes)    Bathing: Minimum assistance(seated- A to feet following back precautions)    Upper Body Dressing: Setup    Lower Body Dressing:  Moderate assistance(seated- A to feet following back precautions)    Toileting: Minimum assistance(A to manage clothing)                ADL Intervention and task modifications:  Patient instructed and demonstrated 2/3 back precautions with verbal cues. Cognitive Retraining  Safety/Judgement: Awareness of environment;Decreased awareness of need for safety; Fall prevention; Insight into deficits    Patient instructed and indicated understanding the benefits of maintaining activity tolerance, functional mobility, and independence with self care tasks during acute stay  to ensure safe return home and to baseline. Encouraged patient to increase frequency and duration OOB, not sitting longer than 30 mins without marching/walking with staff, be out of bed for all meals, perform daily ADLs (as approved by RN/MD regarding bathing etc), and performing functional mobility to/from bathroom. Patient instruction and indicated understanding on body mechanics, ergonomics and gravitational force on the spine during different body positions to plan activities in prep for return home to complete basic ADLs, instrumental ADLs and back to work safely. Patient instructed and demonstrated while supine hip ER stretch and hold 10 seconds to increase ROM in prep for lower body ADLs daily. Bathing: Patient instructed and indicated understanding when bathing to not submerge wound in water, stand to shower or sponge bathe, cover wound with plastic and tape to ensure no water reaches bandage/wound without cues. Dressing brace: Patient instructed and demonstrated to don/doff velcro on brace using dominant side, keeping non-dominant side intact. Patient instructed and demonstrated in meantime of being able to stand with back against wall to don/doff brace, to don/doff seated using lap and bed/chair surface to support brace while manipulating. Dressing lower body: Patient instructed to don brace first and on the benefits to remain seated to don all clothing to increase independence with precautions and pain management.  Patient instructed and demonstrated tailor sitting for lower body dressing. Toileting: Patient instructed on the benefits of using flushable wet wipes and toilet tongs if decreased reach or pain for christina care. Also, the benefits of a reacher to aid in clothing management. Patient instruction and indicated understanding to not strain i.e. holding breath to bear down during a bowel movement, lifting/activity, and sexual activity. Home safety: Patient instructed and indicated understanding on home modifications and safety [raise height of ADL objects (i.e. clothing, sink items, fridge items, items to mouth when grooming), appropriate height of chair surfaces, recliner safety, change of floor surfaces, clear pathways] to increase independence and fall prevention. Standing: Patient instructed and indicated understanding to walk up to sink/counter top/surfaces, step into walker, square off while using objects, slide objects along surfaces, to increase adherence to back precautions and fall prevention. Patient instructed to increase amount of time standing in order to increase independence and tolerance with ADLs. During prolonged standing, can open cabinet door or place foot on stool to decrease spinal pressure/increase pain. Tub transfer: Patient instructed and indicated understanding regarding when it is safe to begin transfer into tub (complete stairs with PT, advance exercises with PT high enough to clear tub height, and while clothes donned practice with another person present). Functional Measure:  Barthel Index:    Bathin  Bladder: 10  Bowels: 10  Groomin  Dressin  Feeding: 10  Mobility: 0  Stairs: 0  Toilet Use: 5  Transfer (Bed to Chair and Back): 10  Total: 55/100        The Barthel ADL Index: Guidelines  1. The index should be used as a record of what a patient does, not as a record of what a patient could do.   2. The main aim is to establish degree of independence from any help, physical or verbal, however minor and for whatever reason. 3. The need for supervision renders the patient not independent. 4. A patient's performance should be established using the best available evidence. Asking the patient, friends/relatives and nurses are the usual sources, but direct observation and common sense are also important. However direct testing is not needed. 5. Usually the patient's performance over the preceding 24-48 hours is important, but occasionally longer periods will be relevant. 6. Middle categories imply that the patient supplies over 50 per cent of the effort. 7. Use of aids to be independent is allowed. Brittnee Scott., Barthel, DMarieW. ([id number]). Functional evaluation: the Barthel Index. [facility] (14)2. Kala Tarango daniel ANATOLIY OvalleF, Stan Phillips., Holly Teran., Eliseo, [address] (1999). Measuring the change indisability after inpatient rehabilitation; comparison of the responsiveness of the Barthel Index and Functional [place] Measure. Journal of Neurology, Neurosurgery, and Psychiatry, 66(4), 109-014. SILVANO SepulvedaJ.JERSON, ANDRY Duke, & Marge Choe M.A. (2004.) Assessment of post-stroke quality of life in cost-effectiveness studies: The usefulness of the Barthel Index and the EuroQoL-5D. Quality of Life Research, 15, 327-85        Occupational Therapy Evaluation Charge Determination   History Examination Decision-Making   LOW Complexity : Brief history review  MEDIUM Complexity : 3-5 performance deficits relating to physical, cognitive , or psychosocial skils that result in activity limitations and / or participation restrictions MEDIUM Complexity : Patient may present with comorbidities that affect occupational performnce.  Miniml to moderate modification of tasks or assistance (eg, physical or verbal ) with assesment(s) is necessary to enable patient to complete evaluation       Based on the above components, the patient evaluation is determined to be of the following complexity level: LOW   Pain Ratin/10    Activity Tolerance:   Fair and requires rest breaks  Please refer to the flowsheet for vital signs taken during this treatment. After treatment patient left in no apparent distress:    Supine in bed, Call bell within reach and Side rails x 3    COMMUNICATION/EDUCATION:   The patients plan of care was discussed with: Physical Therapist and Registered Nurse. Home safety education was provided and the patient/caregiver indicated understanding., Patient/family have participated as able in goal setting and plan of care. and Patient/family agree to work toward stated goals and plan of care. This patients plan of care is appropriate for delegation to hospitals.     Thank you for this referral.  Aminata Mcgarry OT  Time Calculation: 33 mins

## 2019-12-30 ENCOUNTER — HOSPITAL ENCOUNTER (OUTPATIENT)
Dept: MRI IMAGING | Age: 59
Discharge: HOME | End: 2019-12-30
Attending: ORTHOPAEDIC SURGERY
Payer: MEDICAID

## 2019-12-30 DIAGNOSIS — M54.50 LOW BACK PAIN: ICD-10-CM

## 2019-12-30 DIAGNOSIS — M48.061 LUMBAR SPINAL STENOSIS: ICD-10-CM

## 2019-12-30 PROCEDURE — 72148 MRI LUMBAR SPINE W/O DYE: CPT

## 2020-03-18 ENCOUNTER — HOSPITAL ENCOUNTER (OUTPATIENT)
Dept: MRI IMAGING | Age: 60
Discharge: HOME | End: 2020-03-18
Attending: PHYSICAL MEDICINE & REHABILITATION
Payer: MEDICAID

## 2020-03-18 DIAGNOSIS — R20.2 NUMBNESS AND TINGLING OF LEFT UPPER EXTREMITY: ICD-10-CM

## 2020-03-18 DIAGNOSIS — R20.0 NUMBNESS AND TINGLING OF LEFT UPPER EXTREMITY: ICD-10-CM

## 2020-03-18 DIAGNOSIS — M50.30 DDD (DEGENERATIVE DISC DISEASE), CERVICAL: ICD-10-CM

## 2020-03-18 PROCEDURE — 72141 MRI NECK SPINE W/O DYE: CPT

## 2020-05-13 ENCOUNTER — HOSPITAL ENCOUNTER (OUTPATIENT)
Dept: MRI IMAGING | Age: 60
Discharge: HOME | End: 2020-05-13
Attending: ORTHOPAEDIC SURGERY
Payer: MEDICAID

## 2020-05-13 DIAGNOSIS — M54.31 RIGHT SIDED SCIATICA: ICD-10-CM

## 2020-05-13 DIAGNOSIS — M54.50 LOW BACK PAIN: ICD-10-CM

## 2020-05-13 DIAGNOSIS — M47.817 LUMBOSACRAL SPONDYLOSIS WITHOUT MYELOPATHY: ICD-10-CM

## 2020-05-13 DIAGNOSIS — M96.1 POSTLAMINECTOMY SYNDROME, LUMBAR REGION: ICD-10-CM

## 2020-05-13 PROCEDURE — A9575 INJ GADOTERATE MEGLUMI 0.1ML: HCPCS | Performed by: ORTHOPAEDIC SURGERY

## 2020-05-13 PROCEDURE — 72158 MRI LUMBAR SPINE W/O & W/DYE: CPT

## 2020-05-13 PROCEDURE — 74011250636 HC RX REV CODE- 250/636: Performed by: ORTHOPAEDIC SURGERY

## 2020-05-13 RX ORDER — GADOTERATE MEGLUMINE 376.9 MG/ML
20 INJECTION INTRAVENOUS
Status: COMPLETED | OUTPATIENT
Start: 2020-05-13 | End: 2020-05-13

## 2020-05-13 RX ADMIN — GADOTERATE MEGLUMINE 20 ML: 376.9 INJECTION INTRAVENOUS at 11:00

## 2020-08-08 ENCOUNTER — HOSPITAL ENCOUNTER (OUTPATIENT)
Dept: MRI IMAGING | Age: 60
Discharge: HOME | End: 2020-08-08
Attending: ORTHOPAEDIC SURGERY
Payer: MEDICAID

## 2020-08-08 ENCOUNTER — HOSPITAL ENCOUNTER (OUTPATIENT)
Dept: CT IMAGING | Age: 60
Discharge: HOME | End: 2020-08-08
Attending: ORTHOPAEDIC SURGERY
Payer: MEDICAID

## 2020-08-08 DIAGNOSIS — M54.31 BILATERAL SCIATICA: ICD-10-CM

## 2020-08-08 DIAGNOSIS — R20.0 NUMBNESS AND TINGLING: ICD-10-CM

## 2020-08-08 DIAGNOSIS — M71.38 SYNOVIAL CYST OF LUMBAR FACET JOINT: ICD-10-CM

## 2020-08-08 DIAGNOSIS — M48.061 SPINAL STENOSIS, LUMBAR REGION, WITHOUT NEUROGENIC CLAUDICATION: ICD-10-CM

## 2020-08-08 DIAGNOSIS — M47.817 LUMBOSACRAL SPONDYLOSIS WITHOUT MYELOPATHY: ICD-10-CM

## 2020-08-08 DIAGNOSIS — M50.30 DEGENERATION OF CERVICAL INTERVERTEBRAL DISC: ICD-10-CM

## 2020-08-08 DIAGNOSIS — R20.2 NUMBNESS AND TINGLING: ICD-10-CM

## 2020-08-08 DIAGNOSIS — M51.36 DEGENERATION OF LUMBAR INTERVERTEBRAL DISC: ICD-10-CM

## 2020-08-08 DIAGNOSIS — M96.1 POSTLAMINECTOMY SYNDROME, CERVICAL REGION: ICD-10-CM

## 2020-08-08 DIAGNOSIS — M54.50 LUMBAGO: ICD-10-CM

## 2020-08-08 DIAGNOSIS — M54.32 BILATERAL SCIATICA: ICD-10-CM

## 2020-08-08 DIAGNOSIS — M48.062 PSEUDOCLAUDICATION SYNDROME: ICD-10-CM

## 2020-08-08 PROCEDURE — 72131 CT LUMBAR SPINE W/O DYE: CPT

## 2020-08-10 ENCOUNTER — HOSPITAL ENCOUNTER (OUTPATIENT)
Dept: PREADMISSION TESTING | Age: 60
Discharge: HOME | End: 2020-08-10
Payer: MEDICAID

## 2020-08-10 ENCOUNTER — HOSPITAL ENCOUNTER (OUTPATIENT)
Dept: GENERAL RADIOLOGY | Age: 60
Discharge: HOME | End: 2020-08-10
Attending: ORTHOPAEDIC SURGERY
Payer: MEDICAID

## 2020-08-10 VITALS
HEIGHT: 71 IN | RESPIRATION RATE: 16 BRPM | WEIGHT: 217.81 LBS | DIASTOLIC BLOOD PRESSURE: 75 MMHG | TEMPERATURE: 98.1 F | BODY MASS INDEX: 30.49 KG/M2 | HEART RATE: 102 BPM | SYSTOLIC BLOOD PRESSURE: 128 MMHG | OXYGEN SATURATION: 98 %

## 2020-08-10 LAB
25(OH)D3 SERPL-MCNC: 36.7 NG/ML (ref 30–100)
ABO + RH BLD: NORMAL
ALBUMIN SERPL-MCNC: 3.5 G/DL (ref 3.5–5)
ALBUMIN/GLOB SERPL: 0.8 {RATIO} (ref 1.1–2.2)
ALP SERPL-CCNC: 109 U/L (ref 45–117)
ALT SERPL-CCNC: 20 U/L (ref 12–78)
ANION GAP SERPL CALC-SCNC: 5 MMOL/L (ref 5–15)
APPEARANCE UR: CLEAR
AST SERPL-CCNC: 18 U/L (ref 15–37)
BACTERIA URNS QL MICRO: NEGATIVE /HPF
BILIRUB SERPL-MCNC: 0.2 MG/DL (ref 0.2–1)
BILIRUB UR QL: NEGATIVE
BLOOD GROUP ANTIBODIES SERPL: NORMAL
BUN SERPL-MCNC: 12 MG/DL (ref 6–20)
BUN/CREAT SERPL: 8 (ref 12–20)
CALCIUM SERPL-MCNC: 9.6 MG/DL (ref 8.5–10.1)
CHLORIDE SERPL-SCNC: 106 MMOL/L (ref 97–108)
CO2 SERPL-SCNC: 29 MMOL/L (ref 21–32)
COLOR UR: NORMAL
CREAT SERPL-MCNC: 1.53 MG/DL (ref 0.7–1.3)
EPITH CASTS URNS QL MICRO: NORMAL /LPF
ERYTHROCYTE [DISTWIDTH] IN BLOOD BY AUTOMATED COUNT: 21.3 % (ref 11.5–14.5)
EST. AVERAGE GLUCOSE BLD GHB EST-MCNC: 120 MG/DL
GLOBULIN SER CALC-MCNC: 4.6 G/DL (ref 2–4)
GLUCOSE SERPL-MCNC: 99 MG/DL (ref 65–100)
GLUCOSE UR STRIP.AUTO-MCNC: NEGATIVE MG/DL
HBA1C MFR BLD: 5.8 % (ref 4–5.6)
HCT VFR BLD AUTO: 37.8 % (ref 36.6–50.3)
HGB BLD-MCNC: 11.3 G/DL (ref 12.1–17)
HGB UR QL STRIP: NEGATIVE
HYALINE CASTS URNS QL MICRO: NORMAL /LPF (ref 0–5)
INR PPP: 1 (ref 0.9–1.1)
KETONES UR QL STRIP.AUTO: NEGATIVE MG/DL
LEUKOCYTE ESTERASE UR QL STRIP.AUTO: NEGATIVE
MCH RBC QN AUTO: 21.7 PG (ref 26–34)
MCHC RBC AUTO-ENTMCNC: 29.9 G/DL (ref 30–36.5)
MCV RBC AUTO: 72.7 FL (ref 80–99)
NITRITE UR QL STRIP.AUTO: NEGATIVE
NRBC # BLD: 0 K/UL (ref 0–0.01)
NRBC BLD-RTO: 0 PER 100 WBC
PH UR STRIP: 5.5 [PH] (ref 5–8)
PLATELET # BLD AUTO: 305 K/UL (ref 150–400)
PMV BLD AUTO: 8.8 FL (ref 8.9–12.9)
POTASSIUM SERPL-SCNC: 3.8 MMOL/L (ref 3.5–5.1)
PROT SERPL-MCNC: 8.1 G/DL (ref 6.4–8.2)
PROT UR STRIP-MCNC: NEGATIVE MG/DL
PROTHROMBIN TIME: 10.2 SEC (ref 9–11.1)
RBC # BLD AUTO: 5.2 M/UL (ref 4.1–5.7)
RBC #/AREA URNS HPF: NORMAL /HPF (ref 0–5)
SODIUM SERPL-SCNC: 140 MMOL/L (ref 136–145)
SP GR UR REFRACTOMETRY: 1.02 (ref 1–1.03)
SPECIMEN EXP DATE BLD: NORMAL
UA: UC IF INDICATED,UAUC: NORMAL
UROBILINOGEN UR QL STRIP.AUTO: 0.2 EU/DL (ref 0.2–1)
WBC # BLD AUTO: 6.4 K/UL (ref 4.1–11.1)
WBC URNS QL MICRO: NORMAL /HPF (ref 0–4)

## 2020-08-10 PROCEDURE — 81001 URINALYSIS AUTO W/SCOPE: CPT

## 2020-08-10 PROCEDURE — 82306 VITAMIN D 25 HYDROXY: CPT

## 2020-08-10 PROCEDURE — 86900 BLOOD TYPING SEROLOGIC ABO: CPT

## 2020-08-10 PROCEDURE — 85610 PROTHROMBIN TIME: CPT

## 2020-08-10 PROCEDURE — 84134 ASSAY OF PREALBUMIN: CPT

## 2020-08-10 PROCEDURE — 71046 X-RAY EXAM CHEST 2 VIEWS: CPT

## 2020-08-10 PROCEDURE — 83036 HEMOGLOBIN GLYCOSYLATED A1C: CPT

## 2020-08-10 PROCEDURE — 36415 COLL VENOUS BLD VENIPUNCTURE: CPT

## 2020-08-10 PROCEDURE — 80053 COMPREHEN METABOLIC PANEL: CPT

## 2020-08-10 PROCEDURE — 85027 COMPLETE CBC AUTOMATED: CPT

## 2020-08-10 RX ORDER — SODIUM CHLORIDE, SODIUM LACTATE, POTASSIUM CHLORIDE, CALCIUM CHLORIDE 600; 310; 30; 20 MG/100ML; MG/100ML; MG/100ML; MG/100ML
25 INJECTION, SOLUTION INTRAVENOUS ONCE
Status: CANCELLED | OUTPATIENT
Start: 2020-08-11 | End: 2020-08-11

## 2020-08-10 RX ORDER — ACETAMINOPHEN 500 MG
1000 TABLET ORAL ONCE
Status: CANCELLED | OUTPATIENT
Start: 2020-08-11 | End: 2020-08-11

## 2020-08-10 RX ORDER — DICLOFENAC SODIUM 10 MG/G
2 GEL TOPICAL 4 TIMES DAILY
COMMUNITY
End: 2021-06-28

## 2020-08-10 RX ORDER — PREGABALIN 150 MG/1
150 CAPSULE ORAL ONCE
Status: CANCELLED | OUTPATIENT
Start: 2020-08-11 | End: 2020-08-11

## 2020-08-10 RX ORDER — SENNOSIDES 8.6 MG/1
2 TABLET ORAL 2 TIMES DAILY
COMMUNITY

## 2020-08-10 RX ORDER — LANOLIN ALCOHOL/MO/W.PET/CERES
CREAM (GRAM) TOPICAL
COMMUNITY

## 2020-08-10 NOTE — PERIOP NOTES
Incentive Spirometer        Using the incentive spirometer helps expand the small air sacs of your lungs, helps you breathe deeply, and helps improve your lung function. Use your incentive spirometer twice a day (10 breaths each time) prior to surgery. How to Use Your Incentive Spirometer:  1. Hold the incentive spirometer in an upright position. 2. Breathe out as usual.   3. Place the mouthpiece in your mouth and seal your lips tightly around it. 4. Take a deep breath. Breathe in slowly and as deeply as possible. Keep the blue flow rate guide between the arrows. 5. Hold your breath as long as possible. Then exhale slowly and allow the piston to fall to the bottom of the column. 6. Rest for a few seconds and repeat steps one through five at least 10 times. PAT Tidal Volume______1000________  x_______2________  Date_______8/10/2020_________    Erika Graft THE INCENTIVE SPIROMETER WITH YOU TO THE HOSPITAL ON THE DAY OF YOUR SURGERY. Opportunity given to ask and answer questions as well as to observe return demonstration.     Patient signature_____________________________    Witness____________________________

## 2020-08-10 NOTE — PERIOP NOTES
Dr. Mary Gabriel' office called - due to patient missing appointment for Covid testing surgery is being postponed. Patient is aware.

## 2020-08-10 NOTE — PERIOP NOTES
Hibiclens/Chlorhexidine    Preventing Infections Before and After  Your Surgery    IMPORTANT INSTRUCTIONS    Please read and follow these instructions carefully. If you are unable to comply with the below instructions your procedure will be cancelled. Every Night for Three (3) nights before your surgery:  1. Shower with an antibacterial soap, such as Dial, or the soap provided at your preassessment appointment. A shower is better than a bath for cleaning your skin. 2. If needed, ask someone to help you reach all areas of your body. Dont forget to clean your belly button with every shower. The night before your surgery: If you lose your Hibiclens/chlorhexidine please contact surgery center or you can purchase it at a local pharmacy  1. On the night before your surgery, shower with an antibacterial soap, such as Dial, or the soap provided at your preassessment appointment. 2. With one packet of Hibiclens/Chlorhexidine in hand, turn water off.  3. Apply Hibiclens antiseptic skin cleanser with a clean, freshly washed washcloth. ? Gently apply to your body from chin to toes (except the genital area) and especially the area(s) where your incision(s) will be. ? Leave Hibiclens/Chlorhexidine on your skin for at least 20 seconds. CAUTION: If needed, Hibiclens/chlorhexidine may be used to clean the folds of skin of the legs (such as in the area of the groin) and on your buttocks and hips. However, do not use Hibiclens/Chlorhexidine above the neck or in the genital area (your bottom) or put inside any area of your body. 4. Turn the water back on and rinse. 5. Dry gently with a clean, freshly washed towel. 6. After your shower, do not use any powder, deodorant, perfumes or lotion. 7. Use clean, freshly washed towels and washcloths every time you shower. 8. Wear clean, freshly washed pajamas to bed the night before surgery. 9. Sleep on clean, freshly washed sheets.   10. Do not allow pets to sleep in your bed with you. The Morning of your surgery:  1. Shower again thoroughly with an antibacterial soap, such as Dial or the soap provided at your preassessment appointment. If needed, ask someone for help to reach all areas of your body. Dont forget to clean your belly button! Rinse. 2. Dry gently with a clean, freshly washed towel. 3. After your shower, do not use any powder, deodorant, perfumes or lotion prior to surgery. 4. Put on clean, freshly washed clothing. Tips to help prevent infections after your surgery:  1. Protect your surgical wound from germs:  ? Hand washing is the most important thing you and your caregivers can do to prevent infections. ? Keep your bandage clean and dry! ? Do not touch your surgical wound. 2. Use clean, freshly washed towels and washcloths every time you shower; do not share bath linens with others. 3. Until your surgical wound is healed, wear clothing and sleep on bed linens each day that are clean and freshly washed. 4. Do not allow pets to sleep in your bed with you or touch your surgical wound. 5. Do not smoke  smoking delays wound healing. This may be a good time to stop smoking. 6. If you have diabetes, it is important for you to manage your blood sugar levels properly before your surgery as well as after your surgery. Poorly managed blood sugar levels slow down wound healing and prevent you from healing completely. If you lose your Hibiclens/chlorhexidine, please call the [facility], or it is available for purchase at your pharmacy.                ___________________      ___________________      ________________  (Signature of Patient)          (Witness)                   (Date and Time)

## 2020-08-10 NOTE — PERIOP NOTES
[facility]  Joint/Spine Preoperative Instructions        Surgery Date 8/11/2020    Time of Arrival 1100am  Contact# [phone]    1. On the day of your surgery, please report to the Surgical Services Registration Desk and sign in at your designated time. The Surgery Center is located to the right of the Emergency Room. 2. You must have someone with you to drive you home. You should not drive a car for 24 hours following surgery. Please make arrangements for a friend or family member to stay with you for the first 24 hours after your surgery. 3. No food after midnight 8/10/2020. Medications morning of surgery should be taken with a sip of water. Please follow pre-surgery drink instructions that were given at your Pre Admission Testing appointment. 4. We recommend you do not drink any alcoholic beverages for 24 hours before and after your surgery. 5. Contact your surgeons office for instructions on the following medications: non-steroidal anti-inflammatory drugs (i.e. Advil, Aleve), vitamins, and supplements. (Some surgeons will want you to stop these medications prior to surgery and others may allow you to take them)  **If you are currently taking Plavix, Coumadin, Aspirin and/or other blood-thinning agents, contact your surgeon for instructions. ** Your surgeon will partner with the physician prescribing these medications to determine if it is safe to stop or if you need to continue taking. Please do not stop taking these medications without instructions from your surgeon    6. Wear comfortable clothes. Wear glasses instead of contacts. Do not bring any money or jewelry. Please bring picture ID, insurance card, and any prearranged co-payment or hospital payment. Do not wear make-up, particularly mascara the morning of your surgery. Do not wear nail polish, particularly if you are having foot /hand surgery.   Wear your hair loose or down, no ponytails, buns, rosalio pins or clips. All body piercings must be removed. Please shower with antibacterial soap for three consecutive days before and on the morning of surgery, but do not apply any lotions, powders or deodorants after the shower on the day of surgery. Please use a fresh towels after each shower. Please sleep in clean clothes and change bed linens the night before surgery. Please do not shave for 48 hours prior to surgery. Shaving of the face is acceptable. 7. You should understand that if you do not follow these instructions your surgery may be cancelled. If your physical condition changes (I.e. fever, cold or flu) please contact your surgeon as soon as possible. 8. It is important that you be on time. If a situation occurs where you may be late, please call [phone] (OR Holding Area). 9. If you have any questions and or problems, please call [phone] (Pre-admission Testing). 10. Your surgery time may be subject to change. You will receive a phone call the evening prior if your time changes. 11.  If having outpatient surgery, you must have someone to drive you here, stay with you during the duration of your stay, and to drive you home at time of discharge. 12. The following link is for the educational video for patients and/or families. http://yeh-lee.org/. [email or web address]/[email or web address]-qporidr-cnjadzp/[email or web address]/educational-materials    Special Instructions: Follow all instructions from your doctor. Bring all rescue inhalers with you to the hospital.  Bring your Spine/Ortho book with you to the hospital and to all follow-up appointments. TAKE ALL MEDICATIONS THE DAY OF SURGERY EXCEPT: diclofenac 1%gel, gabapentin, all vitamins and supplements. I understand a pre-operative phone call will be made to verify my surgery time.   In the event that I am not available, I give permission for a message to be left on my answering service and/or with another person?   yes         ___________________      __________   _________    (Signature of Patient)             (Witness)                (Date and Time)

## 2020-08-10 NOTE — PERIOP NOTES

## 2020-08-10 NOTE — PERIOP NOTES
PAT appointment with patient - reviewed all instructions and teaching with no further questions. Spine/Ortho book given and reviewed with patient. Pt uses O2 daily 2L while sitting - 3L at night or walking.

## 2020-08-11 LAB
BACTERIA SPEC CULT: NORMAL
BACTERIA SPEC CULT: NORMAL
PREALB SERPL-MCNC: 33 MG/DL (ref 20–40)
SERVICE CMNT-IMP: NORMAL

## 2020-08-21 ENCOUNTER — HOSPITAL ENCOUNTER (OUTPATIENT)
Dept: PREADMISSION TESTING | Age: 60
Discharge: HOME | End: 2020-08-21
Attending: ORTHOPAEDIC SURGERY
Payer: MEDICAID

## 2020-08-21 ENCOUNTER — HOSPITAL ENCOUNTER (OUTPATIENT)
Dept: PREADMISSION TESTING | Age: 60
Discharge: HOME | End: 2020-08-21
Payer: MEDICAID

## 2020-08-21 VITALS
TEMPERATURE: 98.1 F | HEIGHT: 71 IN | RESPIRATION RATE: 16 BRPM | WEIGHT: 217.81 LBS | SYSTOLIC BLOOD PRESSURE: 128 MMHG | OXYGEN SATURATION: 98 % | HEART RATE: 102 BPM | BODY MASS INDEX: 30.49 KG/M2 | DIASTOLIC BLOOD PRESSURE: 75 MMHG

## 2020-08-21 LAB
ABO + RH BLD: NORMAL
BLOOD GROUP ANTIBODIES SERPL: NORMAL
SPECIMEN EXP DATE BLD: NORMAL

## 2020-08-21 PROCEDURE — 87635 SARS-COV-2 COVID-19 AMP PRB: CPT

## 2020-08-21 PROCEDURE — 36415 COLL VENOUS BLD VENIPUNCTURE: CPT

## 2020-08-21 PROCEDURE — 86900 BLOOD TYPING SEROLOGIC ABO: CPT

## 2020-08-22 LAB
HEALTH STATUS, XMCV2T: NORMAL
SARS-COV-2, COV2NT: NOT DETECTED
SOURCE, COVRS: NORMAL
SPECIMEN SOURCE, FCOV2M: NORMAL
SPECIMEN TYPE, XMCV1T: NORMAL

## 2020-08-25 ENCOUNTER — HOSPITAL ENCOUNTER (INPATIENT)
Age: 60
LOS: 2 days | Discharge: HOME HEALTH | DRG: 304 | End: 2020-08-27
Attending: ORTHOPAEDIC SURGERY | Admitting: ORTHOPAEDIC SURGERY
Payer: MEDICAID

## 2020-08-25 ENCOUNTER — ANESTHESIA EVENT (OUTPATIENT)
Dept: SURGERY | Age: 60
DRG: 304 | End: 2020-08-25
Payer: MEDICAID

## 2020-08-25 ENCOUNTER — APPOINTMENT (OUTPATIENT)
Dept: GENERAL RADIOLOGY | Age: 60
DRG: 304 | End: 2020-08-25
Attending: ORTHOPAEDIC SURGERY
Payer: MEDICAID

## 2020-08-25 ENCOUNTER — ANESTHESIA (OUTPATIENT)
Dept: SURGERY | Age: 60
DRG: 304 | End: 2020-08-25
Payer: MEDICAID

## 2020-08-25 DIAGNOSIS — Z98.890 STATUS POST LUMBAR SPINE OPERATIVE PROCEDURE FOR DECOMPRESSION OF SPINAL CORD: Primary | ICD-10-CM

## 2020-08-25 PROBLEM — M48.062 SPINAL STENOSIS OF LUMBAR REGION WITH NEUROGENIC CLAUDICATION: Status: ACTIVE | Noted: 2020-08-25

## 2020-08-25 LAB
GLUCOSE BLD STRIP.AUTO-MCNC: 92 MG/DL (ref 65–100)
SERVICE CMNT-IMP: NORMAL

## 2020-08-25 PROCEDURE — 77030033138 HC SUT PGA STRATFX J&J -B: Performed by: ORTHOPAEDIC SURGERY

## 2020-08-25 PROCEDURE — 77030013079 HC BLNKT BAIR HGGR 3M -A: Performed by: NURSE ANESTHETIST, CERTIFIED REGISTERED

## 2020-08-25 PROCEDURE — 74011250636 HC RX REV CODE- 250/636: Performed by: ORTHOPAEDIC SURGERY

## 2020-08-25 PROCEDURE — 77030040951 HC GRFT BN OSTEOAMP SELCT BTUS -I: Performed by: ORTHOPAEDIC SURGERY

## 2020-08-25 PROCEDURE — 51798 US URINE CAPACITY MEASURE: CPT

## 2020-08-25 PROCEDURE — 74011250636 HC RX REV CODE- 250/636: Performed by: ANESTHESIOLOGY

## 2020-08-25 PROCEDURE — 74011250636 HC RX REV CODE- 250/636: Performed by: NURSE ANESTHETIST, CERTIFIED REGISTERED

## 2020-08-25 PROCEDURE — 82962 GLUCOSE BLOOD TEST: CPT

## 2020-08-25 PROCEDURE — 77030038692 HC WND DEB SYS IRMX -B: Performed by: ORTHOPAEDIC SURGERY

## 2020-08-25 PROCEDURE — C1713 ANCHOR/SCREW BN/BN,TIS/BN: HCPCS | Performed by: ORTHOPAEDIC SURGERY

## 2020-08-25 PROCEDURE — 77030032806 HC CAP SPN LOK CREO GLBM -B: Performed by: ORTHOPAEDIC SURGERY

## 2020-08-25 PROCEDURE — 77030018846 HC SOL IRR STRL H20 ICUM -A: Performed by: ORTHOPAEDIC SURGERY

## 2020-08-25 PROCEDURE — 77030041680 HC PNCL ELECSURG SMK EVAC CNMD -B: Performed by: ORTHOPAEDIC SURGERY

## 2020-08-25 PROCEDURE — 97161 PT EVAL LOW COMPLEX 20 MIN: CPT

## 2020-08-25 PROCEDURE — 77030005513 HC CATH URETH FOL11 MDII -B: Performed by: ORTHOPAEDIC SURGERY

## 2020-08-25 PROCEDURE — 0ST20ZZ RESECTION OF LUMBAR VERTEBRAL DISC, OPEN APPROACH: ICD-10-PCS | Performed by: ORTHOPAEDIC SURGERY

## 2020-08-25 PROCEDURE — 77030029099 HC BN WAX SSPC -A: Performed by: ORTHOPAEDIC SURGERY

## 2020-08-25 PROCEDURE — 74011000250 HC RX REV CODE- 250: Performed by: ORTHOPAEDIC SURGERY

## 2020-08-25 PROCEDURE — 74011250637 HC RX REV CODE- 250/637: Performed by: ORTHOPAEDIC SURGERY

## 2020-08-25 PROCEDURE — 97116 GAIT TRAINING THERAPY: CPT

## 2020-08-25 PROCEDURE — 94640 AIRWAY INHALATION TREATMENT: CPT

## 2020-08-25 PROCEDURE — 77030034479 HC ADH SKN CLSR PRINEO J&J -B: Performed by: ORTHOPAEDIC SURGERY

## 2020-08-25 PROCEDURE — 77030026438 HC STYL ET INTUB CARD -A: Performed by: NURSE ANESTHETIST, CERTIFIED REGISTERED

## 2020-08-25 PROCEDURE — 65270000029 HC RM PRIVATE

## 2020-08-25 PROCEDURE — 77030003445 HC NDL BIOP BN BD -B: Performed by: ORTHOPAEDIC SURGERY

## 2020-08-25 PROCEDURE — 77030003029 HC SUT VCRL J&J -B: Performed by: ORTHOPAEDIC SURGERY

## 2020-08-25 PROCEDURE — 8E0W0CZ ROBOTIC ASSISTED PROCEDURE OF TRUNK REGION, OPEN APPROACH: ICD-10-PCS | Performed by: ORTHOPAEDIC SURGERY

## 2020-08-25 PROCEDURE — L0627 LO SAG RI AN/POS PNL PRE CST: HCPCS

## 2020-08-25 PROCEDURE — 74011250636 HC RX REV CODE- 250/636

## 2020-08-25 PROCEDURE — 0QB30ZZ EXCISION OF LEFT PELVIC BONE, OPEN APPROACH: ICD-10-PCS | Performed by: ORTHOPAEDIC SURGERY

## 2020-08-25 PROCEDURE — 76010000172 HC OR TIME 2.5 TO 3 HR INTENSV-TIER 1: Performed by: ORTHOPAEDIC SURGERY

## 2020-08-25 PROCEDURE — 77030018836 HC SOL IRR NACL ICUM -A: Performed by: ORTHOPAEDIC SURGERY

## 2020-08-25 PROCEDURE — 76000 FLUOROSCOPY <1 HR PHYS/QHP: CPT

## 2020-08-25 PROCEDURE — 77030014007 HC SPNG HEMSTAT J&J -B: Performed by: ORTHOPAEDIC SURGERY

## 2020-08-25 PROCEDURE — 72100 X-RAY EXAM L-S SPINE 2/3 VWS: CPT

## 2020-08-25 PROCEDURE — 77030002933 HC SUT MCRYL J&J -A: Performed by: ORTHOPAEDIC SURGERY

## 2020-08-25 PROCEDURE — 74011000250 HC RX REV CODE- 250: Performed by: NURSE ANESTHETIST, CERTIFIED REGISTERED

## 2020-08-25 PROCEDURE — 77030040356 HC CORD BPLR FRCP COVD -A: Performed by: ORTHOPAEDIC SURGERY

## 2020-08-25 PROCEDURE — 0SG00A0 FUSION OF LUMBAR VERTEBRAL JOINT WITH INTERBODY FUSION DEVICE, ANTERIOR APPROACH, ANTERIOR COLUMN, OPEN APPROACH: ICD-10-PCS | Performed by: ORTHOPAEDIC SURGERY

## 2020-08-25 PROCEDURE — 77030008462 HC STPLR SKN PROX J&J -A: Performed by: ORTHOPAEDIC SURGERY

## 2020-08-25 PROCEDURE — 77030029251 HC SPCR SPN GLMB -K1: Performed by: ORTHOPAEDIC SURGERY

## 2020-08-25 PROCEDURE — 77030018723 HC ELCTRD BLD COVD -A: Performed by: ORTHOPAEDIC SURGERY

## 2020-08-25 PROCEDURE — 77030014647 HC SEAL FBRN TISSL BAXT -D: Performed by: ORTHOPAEDIC SURGERY

## 2020-08-25 PROCEDURE — 76060000036 HC ANESTHESIA 2.5 TO 3 HR: Performed by: ORTHOPAEDIC SURGERY

## 2020-08-25 PROCEDURE — 77030040361 HC SLV COMPR DVT MDII -B: Performed by: ORTHOPAEDIC SURGERY

## 2020-08-25 PROCEDURE — 0SG00K1 FUSION OF LUMBAR VERTEBRAL JOINT WITH NONAUTOLOGOUS TISSUE SUBSTITUTE, POSTERIOR APPROACH, POSTERIOR COLUMN, OPEN APPROACH: ICD-10-PCS | Performed by: ORTHOPAEDIC SURGERY

## 2020-08-25 PROCEDURE — 76210000016 HC OR PH I REC 1 TO 1.5 HR: Performed by: ORTHOPAEDIC SURGERY

## 2020-08-25 PROCEDURE — 77030008684 HC TU ET CUF COVD -B: Performed by: NURSE ANESTHETIST, CERTIFIED REGISTERED

## 2020-08-25 PROCEDURE — 77030003028 HC SUT VCRL J&J -A: Performed by: ORTHOPAEDIC SURGERY

## 2020-08-25 PROCEDURE — 77030020268 HC MISC GENERAL SUPPLY: Performed by: ORTHOPAEDIC SURGERY

## 2020-08-25 DEVICE — PLYMOUTH THORACOLUMBAR PLATE, L4-L5, 21MM
Type: IMPLANTABLE DEVICE | Site: SPINE LUMBAR | Status: FUNCTIONAL
Brand: PLYMOUTH

## 2020-08-25 DEVICE — 5.5MM VARIABLE ANGLE SCREW, 30MM
Type: IMPLANTABLE DEVICE | Site: SPINE LUMBAR | Status: FUNCTIONAL
Brand: TRUSS

## 2020-08-25 DEVICE — 7.5 X 50MM CANNULATED SCREW, MODULAR, CREO AMP
Type: IMPLANTABLE DEVICE | Site: SPINE LUMBAR | Status: FUNCTIONAL
Brand: CREO

## 2020-08-25 DEVICE — 7.5 X 45MM CANNULATED SCREW, MODULAR, CREO AMP
Type: IMPLANTABLE DEVICE | Site: SPINE LUMBAR | Status: FUNCTIONAL
Brand: CREO

## 2020-08-25 DEVICE — CREO MIS MODULAR POLYAXIAL TULIP, 30MM REDUCTION
Type: IMPLANTABLE DEVICE | Site: SPINE LUMBAR | Status: FUNCTIONAL
Brand: CREO

## 2020-08-25 DEVICE — RISE-L SPACER 22 X 55MM, 7-14MM, 3-15&DEG;
Type: IMPLANTABLE DEVICE | Site: SPINE LUMBAR | Status: FUNCTIONAL
Brand: RISE-L

## 2020-08-25 DEVICE — GRAFT BONE 10 CC: Type: IMPLANTABLE DEVICE | Site: SPINE LUMBAR | Status: FUNCTIONAL

## 2020-08-25 RX ORDER — HYDROMORPHONE HYDROCHLORIDE 1 MG/ML
1 INJECTION, SOLUTION INTRAMUSCULAR; INTRAVENOUS; SUBCUTANEOUS
Status: ACTIVE | OUTPATIENT
Start: 2020-08-25 | End: 2020-08-26

## 2020-08-25 RX ORDER — SODIUM CHLORIDE 0.9 % (FLUSH) 0.9 %
5-40 SYRINGE (ML) INJECTION EVERY 8 HOURS
Status: DISCONTINUED | OUTPATIENT
Start: 2020-08-25 | End: 2020-08-25 | Stop reason: HOSPADM

## 2020-08-25 RX ORDER — ONDANSETRON 2 MG/ML
4 INJECTION INTRAMUSCULAR; INTRAVENOUS AS NEEDED
Status: DISCONTINUED | OUTPATIENT
Start: 2020-08-25 | End: 2020-08-25 | Stop reason: HOSPADM

## 2020-08-25 RX ORDER — SODIUM CHLORIDE, SODIUM LACTATE, POTASSIUM CHLORIDE, CALCIUM CHLORIDE 600; 310; 30; 20 MG/100ML; MG/100ML; MG/100ML; MG/100ML
25 INJECTION, SOLUTION INTRAVENOUS ONCE
Status: COMPLETED | OUTPATIENT
Start: 2020-08-25 | End: 2020-08-25

## 2020-08-25 RX ORDER — HYDROMORPHONE HYDROCHLORIDE 2 MG/ML
INJECTION, SOLUTION INTRAMUSCULAR; INTRAVENOUS; SUBCUTANEOUS AS NEEDED
Status: DISCONTINUED | OUTPATIENT
Start: 2020-08-25 | End: 2020-08-25 | Stop reason: HOSPADM

## 2020-08-25 RX ORDER — ONDANSETRON 4 MG/1
4 TABLET, ORALLY DISINTEGRATING ORAL
Status: DISCONTINUED | OUTPATIENT
Start: 2020-08-25 | End: 2020-08-27 | Stop reason: HOSPADM

## 2020-08-25 RX ORDER — PROPOFOL 10 MG/ML
INJECTION, EMULSION INTRAVENOUS
Status: DISCONTINUED | OUTPATIENT
Start: 2020-08-25 | End: 2020-08-25 | Stop reason: HOSPADM

## 2020-08-25 RX ORDER — TRAZODONE HYDROCHLORIDE 50 MG/1
50 TABLET ORAL
Status: DISCONTINUED | OUTPATIENT
Start: 2020-08-25 | End: 2020-08-27 | Stop reason: HOSPADM

## 2020-08-25 RX ORDER — NALTREXONE HYDROCHLORIDE 50 MG/1
50 TABLET, FILM COATED ORAL DAILY
Status: DISCONTINUED | OUTPATIENT
Start: 2020-08-26 | End: 2020-08-27 | Stop reason: HOSPADM

## 2020-08-25 RX ORDER — MIDAZOLAM HYDROCHLORIDE 1 MG/ML
INJECTION, SOLUTION INTRAMUSCULAR; INTRAVENOUS AS NEEDED
Status: DISCONTINUED | OUTPATIENT
Start: 2020-08-25 | End: 2020-08-25 | Stop reason: HOSPADM

## 2020-08-25 RX ORDER — FENTANYL CITRATE 50 UG/ML
INJECTION, SOLUTION INTRAMUSCULAR; INTRAVENOUS AS NEEDED
Status: DISCONTINUED | OUTPATIENT
Start: 2020-08-25 | End: 2020-08-25 | Stop reason: HOSPADM

## 2020-08-25 RX ORDER — SENNOSIDES 8.6 MG/1
2 TABLET ORAL 2 TIMES DAILY
Status: DISCONTINUED | OUTPATIENT
Start: 2020-08-25 | End: 2020-08-27 | Stop reason: HOSPADM

## 2020-08-25 RX ORDER — SODIUM CHLORIDE 0.9 % (FLUSH) 0.9 %
5-40 SYRINGE (ML) INJECTION EVERY 8 HOURS
Status: DISCONTINUED | OUTPATIENT
Start: 2020-08-25 | End: 2020-08-27 | Stop reason: HOSPADM

## 2020-08-25 RX ORDER — SODIUM CHLORIDE 9 MG/ML
50 INJECTION, SOLUTION INTRAVENOUS CONTINUOUS
Status: DISCONTINUED | OUTPATIENT
Start: 2020-08-25 | End: 2020-08-25 | Stop reason: HOSPADM

## 2020-08-25 RX ORDER — DIAZEPAM 5 MG/1
5 TABLET ORAL
Status: DISCONTINUED | OUTPATIENT
Start: 2020-08-25 | End: 2020-08-27 | Stop reason: HOSPADM

## 2020-08-25 RX ORDER — LIDOCAINE HYDROCHLORIDE AND EPINEPHRINE 10; 10 MG/ML; UG/ML
INJECTION, SOLUTION INFILTRATION; PERINEURAL AS NEEDED
Status: DISCONTINUED | OUTPATIENT
Start: 2020-08-25 | End: 2020-08-25 | Stop reason: HOSPADM

## 2020-08-25 RX ORDER — DILTIAZEM HYDROCHLORIDE 180 MG/1
360 CAPSULE, COATED, EXTENDED RELEASE ORAL
Status: DISCONTINUED | OUTPATIENT
Start: 2020-08-25 | End: 2020-08-27 | Stop reason: HOSPADM

## 2020-08-25 RX ORDER — MIDAZOLAM HYDROCHLORIDE 1 MG/ML
0.5 INJECTION, SOLUTION INTRAMUSCULAR; INTRAVENOUS
Status: DISCONTINUED | OUTPATIENT
Start: 2020-08-25 | End: 2020-08-25 | Stop reason: HOSPADM

## 2020-08-25 RX ORDER — OXYCODONE AND ACETAMINOPHEN 5; 325 MG/1; MG/1
1 TABLET ORAL AS NEEDED
Status: DISCONTINUED | OUTPATIENT
Start: 2020-08-25 | End: 2020-08-25 | Stop reason: HOSPADM

## 2020-08-25 RX ORDER — SODIUM CHLORIDE 0.9 % (FLUSH) 0.9 %
5-40 SYRINGE (ML) INJECTION AS NEEDED
Status: DISCONTINUED | OUTPATIENT
Start: 2020-08-25 | End: 2020-08-25 | Stop reason: HOSPADM

## 2020-08-25 RX ORDER — FENTANYL CITRATE 50 UG/ML
INJECTION, SOLUTION INTRAMUSCULAR; INTRAVENOUS
Status: COMPLETED
Start: 2020-08-25 | End: 2020-08-25

## 2020-08-25 RX ORDER — FACIAL-BODY WIPES
10 EACH TOPICAL DAILY PRN
Status: DISCONTINUED | OUTPATIENT
Start: 2020-08-27 | End: 2020-08-27 | Stop reason: HOSPADM

## 2020-08-25 RX ORDER — SODIUM CHLORIDE, SODIUM LACTATE, POTASSIUM CHLORIDE, CALCIUM CHLORIDE 600; 310; 30; 20 MG/100ML; MG/100ML; MG/100ML; MG/100ML
75 INJECTION, SOLUTION INTRAVENOUS CONTINUOUS
Status: DISCONTINUED | OUTPATIENT
Start: 2020-08-25 | End: 2020-08-25 | Stop reason: HOSPADM

## 2020-08-25 RX ORDER — ONDANSETRON 2 MG/ML
4 INJECTION INTRAMUSCULAR; INTRAVENOUS
Status: ACTIVE | OUTPATIENT
Start: 2020-08-25 | End: 2020-08-26

## 2020-08-25 RX ORDER — BUSPIRONE HYDROCHLORIDE 5 MG/1
15 TABLET ORAL 3 TIMES DAILY
Status: DISCONTINUED | OUTPATIENT
Start: 2020-08-25 | End: 2020-08-27 | Stop reason: HOSPADM

## 2020-08-25 RX ORDER — FENTANYL CITRATE 50 UG/ML
25 INJECTION, SOLUTION INTRAMUSCULAR; INTRAVENOUS
Status: COMPLETED | OUTPATIENT
Start: 2020-08-25 | End: 2020-08-25

## 2020-08-25 RX ORDER — ACETAMINOPHEN 500 MG
1000 TABLET ORAL EVERY 6 HOURS
Status: DISCONTINUED | OUTPATIENT
Start: 2020-08-25 | End: 2020-08-27 | Stop reason: HOSPADM

## 2020-08-25 RX ORDER — SUCCINYLCHOLINE CHLORIDE 20 MG/ML
INJECTION INTRAMUSCULAR; INTRAVENOUS AS NEEDED
Status: DISCONTINUED | OUTPATIENT
Start: 2020-08-25 | End: 2020-08-25 | Stop reason: HOSPADM

## 2020-08-25 RX ORDER — NEOSTIGMINE METHYLSULFATE 1 MG/ML
INJECTION, SOLUTION INTRAVENOUS AS NEEDED
Status: DISCONTINUED | OUTPATIENT
Start: 2020-08-25 | End: 2020-08-25 | Stop reason: HOSPADM

## 2020-08-25 RX ORDER — DULOXETIN HYDROCHLORIDE 30 MG/1
120 CAPSULE, DELAYED RELEASE ORAL DAILY
Status: DISCONTINUED | OUTPATIENT
Start: 2020-08-26 | End: 2020-08-27 | Stop reason: HOSPADM

## 2020-08-25 RX ORDER — ATORVASTATIN CALCIUM 40 MG/1
40 TABLET, FILM COATED ORAL
Status: DISCONTINUED | OUTPATIENT
Start: 2020-08-25 | End: 2020-08-27 | Stop reason: HOSPADM

## 2020-08-25 RX ORDER — LIDOCAINE HYDROCHLORIDE 20 MG/ML
INJECTION, SOLUTION EPIDURAL; INFILTRATION; INTRACAUDAL; PERINEURAL AS NEEDED
Status: DISCONTINUED | OUTPATIENT
Start: 2020-08-25 | End: 2020-08-25 | Stop reason: HOSPADM

## 2020-08-25 RX ORDER — FUROSEMIDE 40 MG/1
40 TABLET ORAL DAILY
Status: DISCONTINUED | OUTPATIENT
Start: 2020-08-26 | End: 2020-08-27 | Stop reason: HOSPADM

## 2020-08-25 RX ORDER — FAMOTIDINE 20 MG/1
20 TABLET, FILM COATED ORAL 2 TIMES DAILY
Status: DISCONTINUED | OUTPATIENT
Start: 2020-08-25 | End: 2020-08-26 | Stop reason: SDUPTHER

## 2020-08-25 RX ORDER — ACETAMINOPHEN 500 MG
1000 TABLET ORAL ONCE
Status: COMPLETED | OUTPATIENT
Start: 2020-08-25 | End: 2020-08-25

## 2020-08-25 RX ORDER — PREGABALIN 75 MG/1
150 CAPSULE ORAL ONCE
Status: COMPLETED | OUTPATIENT
Start: 2020-08-25 | End: 2020-08-25

## 2020-08-25 RX ORDER — MONTELUKAST SODIUM 10 MG/1
10 TABLET ORAL DAILY
Status: DISCONTINUED | OUTPATIENT
Start: 2020-08-26 | End: 2020-08-27 | Stop reason: HOSPADM

## 2020-08-25 RX ORDER — AMOXICILLIN 250 MG
1 CAPSULE ORAL 2 TIMES DAILY
Status: DISCONTINUED | OUTPATIENT
Start: 2020-08-25 | End: 2020-08-27 | Stop reason: HOSPADM

## 2020-08-25 RX ORDER — OXYCODONE HYDROCHLORIDE 5 MG/1
10-15 TABLET ORAL
Status: DISCONTINUED | OUTPATIENT
Start: 2020-08-25 | End: 2020-08-27 | Stop reason: HOSPADM

## 2020-08-25 RX ORDER — SODIUM CHLORIDE 0.9 % (FLUSH) 0.9 %
5-40 SYRINGE (ML) INJECTION AS NEEDED
Status: DISCONTINUED | OUTPATIENT
Start: 2020-08-25 | End: 2020-08-27 | Stop reason: HOSPADM

## 2020-08-25 RX ORDER — GABAPENTIN 300 MG/1
600 CAPSULE ORAL 3 TIMES DAILY
Status: DISCONTINUED | OUTPATIENT
Start: 2020-08-25 | End: 2020-08-27 | Stop reason: HOSPADM

## 2020-08-25 RX ORDER — ONDANSETRON 2 MG/ML
INJECTION INTRAMUSCULAR; INTRAVENOUS AS NEEDED
Status: DISCONTINUED | OUTPATIENT
Start: 2020-08-25 | End: 2020-08-25 | Stop reason: HOSPADM

## 2020-08-25 RX ORDER — PANTOPRAZOLE SODIUM 40 MG/1
40 TABLET, DELAYED RELEASE ORAL
Status: DISCONTINUED | OUTPATIENT
Start: 2020-08-25 | End: 2020-08-27 | Stop reason: HOSPADM

## 2020-08-25 RX ORDER — ROCURONIUM BROMIDE 10 MG/ML
INJECTION, SOLUTION INTRAVENOUS AS NEEDED
Status: DISCONTINUED | OUTPATIENT
Start: 2020-08-25 | End: 2020-08-25 | Stop reason: HOSPADM

## 2020-08-25 RX ORDER — DIPHENHYDRAMINE HYDROCHLORIDE 50 MG/ML
12.5 INJECTION, SOLUTION INTRAMUSCULAR; INTRAVENOUS AS NEEDED
Status: DISCONTINUED | OUTPATIENT
Start: 2020-08-25 | End: 2020-08-25 | Stop reason: HOSPADM

## 2020-08-25 RX ORDER — LEVETIRACETAM 500 MG/1
1000 TABLET ORAL 2 TIMES DAILY
Status: DISCONTINUED | OUTPATIENT
Start: 2020-08-25 | End: 2020-08-27 | Stop reason: HOSPADM

## 2020-08-25 RX ORDER — MIDAZOLAM HYDROCHLORIDE 1 MG/ML
1 INJECTION, SOLUTION INTRAMUSCULAR; INTRAVENOUS AS NEEDED
Status: DISCONTINUED | OUTPATIENT
Start: 2020-08-25 | End: 2020-08-25 | Stop reason: HOSPADM

## 2020-08-25 RX ORDER — HYDROXYZINE HYDROCHLORIDE 10 MG/1
10 TABLET, FILM COATED ORAL
Status: DISCONTINUED | OUTPATIENT
Start: 2020-08-25 | End: 2020-08-27 | Stop reason: HOSPADM

## 2020-08-25 RX ORDER — ALBUTEROL SULFATE 0.83 MG/ML
2.5 SOLUTION RESPIRATORY (INHALATION)
Status: DISCONTINUED | OUTPATIENT
Start: 2020-08-25 | End: 2020-08-27 | Stop reason: HOSPADM

## 2020-08-25 RX ORDER — SODIUM CHLORIDE 9 MG/ML
125 INJECTION, SOLUTION INTRAVENOUS CONTINUOUS
Status: DISPENSED | OUTPATIENT
Start: 2020-08-25 | End: 2020-08-26

## 2020-08-25 RX ORDER — GLYCOPYRROLATE 0.2 MG/ML
INJECTION INTRAMUSCULAR; INTRAVENOUS AS NEEDED
Status: DISCONTINUED | OUTPATIENT
Start: 2020-08-25 | End: 2020-08-25 | Stop reason: HOSPADM

## 2020-08-25 RX ORDER — LIDOCAINE HYDROCHLORIDE 10 MG/ML
0.1 INJECTION, SOLUTION EPIDURAL; INFILTRATION; INTRACAUDAL; PERINEURAL AS NEEDED
Status: DISCONTINUED | OUTPATIENT
Start: 2020-08-25 | End: 2020-08-25 | Stop reason: HOSPADM

## 2020-08-25 RX ORDER — MORPHINE SULFATE 10 MG/ML
2 INJECTION, SOLUTION INTRAMUSCULAR; INTRAVENOUS
Status: DISCONTINUED | OUTPATIENT
Start: 2020-08-25 | End: 2020-08-25 | Stop reason: HOSPADM

## 2020-08-25 RX ORDER — LANOLIN ALCOHOL/MO/W.PET/CERES
50 CREAM (GRAM) TOPICAL 2 TIMES DAILY
Status: DISCONTINUED | OUTPATIENT
Start: 2020-08-25 | End: 2020-08-27 | Stop reason: HOSPADM

## 2020-08-25 RX ORDER — POLYETHYLENE GLYCOL 3350 17 G/17G
17 POWDER, FOR SOLUTION ORAL DAILY
Status: DISCONTINUED | OUTPATIENT
Start: 2020-08-26 | End: 2020-08-27 | Stop reason: HOSPADM

## 2020-08-25 RX ORDER — NALOXONE HYDROCHLORIDE 0.4 MG/ML
0.4 INJECTION, SOLUTION INTRAMUSCULAR; INTRAVENOUS; SUBCUTANEOUS AS NEEDED
Status: DISCONTINUED | OUTPATIENT
Start: 2020-08-25 | End: 2020-08-27 | Stop reason: HOSPADM

## 2020-08-25 RX ORDER — PROPOFOL 10 MG/ML
INJECTION, EMULSION INTRAVENOUS AS NEEDED
Status: DISCONTINUED | OUTPATIENT
Start: 2020-08-25 | End: 2020-08-25 | Stop reason: HOSPADM

## 2020-08-25 RX ORDER — IPRATROPIUM BROMIDE AND ALBUTEROL SULFATE 2.5; .5 MG/3ML; MG/3ML
3 SOLUTION RESPIRATORY (INHALATION)
Status: DISCONTINUED | OUTPATIENT
Start: 2020-08-25 | End: 2020-08-25 | Stop reason: ALTCHOICE

## 2020-08-25 RX ORDER — ROPIVACAINE HYDROCHLORIDE 5 MG/ML
INJECTION, SOLUTION EPIDURAL; INFILTRATION; PERINEURAL AS NEEDED
Status: DISCONTINUED | OUTPATIENT
Start: 2020-08-25 | End: 2020-08-25 | Stop reason: HOSPADM

## 2020-08-25 RX ORDER — FENTANYL CITRATE 50 UG/ML
50 INJECTION, SOLUTION INTRAMUSCULAR; INTRAVENOUS AS NEEDED
Status: DISCONTINUED | OUTPATIENT
Start: 2020-08-25 | End: 2020-08-25 | Stop reason: HOSPADM

## 2020-08-25 RX ORDER — OXYCODONE HYDROCHLORIDE 5 MG/1
5 TABLET ORAL
Status: DISCONTINUED | OUTPATIENT
Start: 2020-08-25 | End: 2020-08-27 | Stop reason: HOSPADM

## 2020-08-25 RX ORDER — CYCLOBENZAPRINE HCL 10 MG
10 TABLET ORAL
Status: DISCONTINUED | OUTPATIENT
Start: 2020-08-25 | End: 2020-08-27 | Stop reason: HOSPADM

## 2020-08-25 RX ORDER — VANCOMYCIN/0.9 % SOD CHLORIDE 1.5G/250ML
1500 PLASTIC BAG, INJECTION (ML) INTRAVENOUS ONCE
Status: COMPLETED | OUTPATIENT
Start: 2020-08-25 | End: 2020-08-26

## 2020-08-25 RX ORDER — LANOLIN ALCOHOL/MO/W.PET/CERES
1 CREAM (GRAM) TOPICAL
Status: DISCONTINUED | OUTPATIENT
Start: 2020-08-26 | End: 2020-08-27 | Stop reason: HOSPADM

## 2020-08-25 RX ORDER — DICLOFENAC SODIUM 10 MG/G
2 GEL TOPICAL 4 TIMES DAILY
Status: DISCONTINUED | OUTPATIENT
Start: 2020-08-25 | End: 2020-08-27 | Stop reason: HOSPADM

## 2020-08-25 RX ORDER — IPRATROPIUM BROMIDE 0.5 MG/2.5ML
0.5 SOLUTION RESPIRATORY (INHALATION)
Status: DISCONTINUED | OUTPATIENT
Start: 2020-08-25 | End: 2020-08-26

## 2020-08-25 RX ORDER — HYDROMORPHONE HYDROCHLORIDE 1 MG/ML
0.2 INJECTION, SOLUTION INTRAMUSCULAR; INTRAVENOUS; SUBCUTANEOUS
Status: DISCONTINUED | OUTPATIENT
Start: 2020-08-25 | End: 2020-08-25 | Stop reason: HOSPADM

## 2020-08-25 RX ORDER — DEXMEDETOMIDINE HYDROCHLORIDE 100 UG/ML
INJECTION, SOLUTION INTRAVENOUS AS NEEDED
Status: DISCONTINUED | OUTPATIENT
Start: 2020-08-25 | End: 2020-08-25 | Stop reason: HOSPADM

## 2020-08-25 RX ORDER — DEXAMETHASONE SODIUM PHOSPHATE 4 MG/ML
INJECTION, SOLUTION INTRA-ARTICULAR; INTRALESIONAL; INTRAMUSCULAR; INTRAVENOUS; SOFT TISSUE AS NEEDED
Status: DISCONTINUED | OUTPATIENT
Start: 2020-08-25 | End: 2020-08-25 | Stop reason: HOSPADM

## 2020-08-25 RX ADMIN — ATORVASTATIN CALCIUM 40 MG: 40 TABLET, FILM COATED ORAL at 21:12

## 2020-08-25 RX ADMIN — GLYCOPYRROLATE 0.4 MG: 0.2 INJECTION, SOLUTION INTRAMUSCULAR; INTRAVENOUS at 10:53

## 2020-08-25 RX ADMIN — SENNOSIDES 17.2 MG: 8.6 TABLET, FILM COATED ORAL at 18:01

## 2020-08-25 RX ADMIN — ROCURONIUM BROMIDE 10 MG: 10 INJECTION INTRAVENOUS at 08:26

## 2020-08-25 RX ADMIN — DEXAMETHASONE SODIUM PHOSPHATE 8 MG: 4 INJECTION, SOLUTION INTRAMUSCULAR; INTRAVENOUS at 08:56

## 2020-08-25 RX ADMIN — FENTANYL CITRATE 75 MCG: 50 INJECTION, SOLUTION INTRAMUSCULAR; INTRAVENOUS at 08:26

## 2020-08-25 RX ADMIN — HYDROMORPHONE HYDROCHLORIDE 0.2 MG: 2 INJECTION, SOLUTION INTRAMUSCULAR; INTRAVENOUS; SUBCUTANEOUS at 09:33

## 2020-08-25 RX ADMIN — DILTIAZEM HYDROCHLORIDE 360 MG: 180 CAPSULE, COATED, EXTENDED RELEASE ORAL at 21:12

## 2020-08-25 RX ADMIN — DEXMEDETOMIDINE HYDROCHLORIDE 4 MCG: 100 INJECTION, SOLUTION, CONCENTRATE INTRAVENOUS at 08:55

## 2020-08-25 RX ADMIN — HYDROMORPHONE HYDROCHLORIDE 0.2 MG: 2 INJECTION, SOLUTION INTRAMUSCULAR; INTRAVENOUS; SUBCUTANEOUS at 09:42

## 2020-08-25 RX ADMIN — SODIUM CHLORIDE, POTASSIUM CHLORIDE, SODIUM LACTATE AND CALCIUM CHLORIDE: 600; 310; 30; 20 INJECTION, SOLUTION INTRAVENOUS at 07:00

## 2020-08-25 RX ADMIN — DICLOFENAC 2 G: 10 GEL TOPICAL at 18:07

## 2020-08-25 RX ADMIN — DEXMEDETOMIDINE HYDROCHLORIDE 4 MCG: 100 INJECTION, SOLUTION, CONCENTRATE INTRAVENOUS at 08:45

## 2020-08-25 RX ADMIN — PROPOFOL 50 MG: 10 INJECTION, EMULSION INTRAVENOUS at 10:58

## 2020-08-25 RX ADMIN — DIAZEPAM 5 MG: 5 TABLET ORAL at 22:56

## 2020-08-25 RX ADMIN — FENTANYL CITRATE 25 MCG: 50 INJECTION, SOLUTION INTRAMUSCULAR; INTRAVENOUS at 09:12

## 2020-08-25 RX ADMIN — FENTANYL CITRATE 25 MCG: 50 INJECTION, SOLUTION INTRAMUSCULAR; INTRAVENOUS at 11:36

## 2020-08-25 RX ADMIN — DIAZEPAM 5 MG: 5 TABLET ORAL at 12:08

## 2020-08-25 RX ADMIN — LEVETIRACETAM 1000 MG: 500 TABLET ORAL at 18:00

## 2020-08-25 RX ADMIN — WATER 2 G: 1 INJECTION INTRAMUSCULAR; INTRAVENOUS; SUBCUTANEOUS at 17:54

## 2020-08-25 RX ADMIN — OXYCODONE HYDROCHLORIDE 10 MG: 5 TABLET ORAL at 14:48

## 2020-08-25 RX ADMIN — BUSPIRONE HYDROCHLORIDE 15 MG: 5 TABLET ORAL at 18:00

## 2020-08-25 RX ADMIN — Medication 3 MG: at 10:53

## 2020-08-25 RX ADMIN — ARFORMOTEROL TARTRATE: 15 SOLUTION RESPIRATORY (INHALATION) at 21:46

## 2020-08-25 RX ADMIN — FAMOTIDINE 20 MG: 20 TABLET, FILM COATED ORAL at 18:01

## 2020-08-25 RX ADMIN — OXYCODONE HYDROCHLORIDE 10 MG: 5 TABLET ORAL at 17:51

## 2020-08-25 RX ADMIN — BENZOCAINE AND MENTHOL 1 LOZENGE: 15; 3.6 LOZENGE ORAL at 18:09

## 2020-08-25 RX ADMIN — SODIUM CHLORIDE 40 MCG: 900 INJECTION, SOLUTION INTRAVENOUS at 09:38

## 2020-08-25 RX ADMIN — LIDOCAINE HYDROCHLORIDE 100 MG: 20 INJECTION, SOLUTION INTRAVENOUS at 08:26

## 2020-08-25 RX ADMIN — PROPOFOL 70 MCG/KG/MIN: 10 INJECTION, EMULSION INTRAVENOUS at 08:29

## 2020-08-25 RX ADMIN — SODIUM CHLORIDE 125 ML/HR: 900 INJECTION, SOLUTION INTRAVENOUS at 11:36

## 2020-08-25 RX ADMIN — ACETAMINOPHEN 1000 MG: 500 TABLET ORAL at 06:42

## 2020-08-25 RX ADMIN — PYRIDOXINE HCL TAB 50 MG 50 MG: 50 TAB at 18:07

## 2020-08-25 RX ADMIN — Medication 3 AMPULE: at 06:43

## 2020-08-25 RX ADMIN — BUSPIRONE HYDROCHLORIDE 15 MG: 5 TABLET ORAL at 22:55

## 2020-08-25 RX ADMIN — SUCCINYLCHOLINE CHLORIDE 140 MG: 20 INJECTION, SOLUTION INTRAMUSCULAR; INTRAVENOUS at 08:26

## 2020-08-25 RX ADMIN — FENTANYL CITRATE 25 MCG: 50 INJECTION, SOLUTION INTRAMUSCULAR; INTRAVENOUS at 12:24

## 2020-08-25 RX ADMIN — HYDROMORPHONE HYDROCHLORIDE 0.2 MG: 1 INJECTION, SOLUTION INTRAMUSCULAR; INTRAVENOUS; SUBCUTANEOUS at 12:31

## 2020-08-25 RX ADMIN — GABAPENTIN 600 MG: 300 CAPSULE ORAL at 22:55

## 2020-08-25 RX ADMIN — MIDAZOLAM HYDROCHLORIDE 2 MG: 1 INJECTION, SOLUTION INTRAMUSCULAR; INTRAVENOUS at 08:21

## 2020-08-25 RX ADMIN — ACETAMINOPHEN 1000 MG: 325 TABLET ORAL at 18:01

## 2020-08-25 RX ADMIN — OXYCODONE HYDROCHLORIDE 10 MG: 5 TABLET ORAL at 21:10

## 2020-08-25 RX ADMIN — ONDANSETRON HYDROCHLORIDE 4 MG: 2 INJECTION, SOLUTION INTRAMUSCULAR; INTRAVENOUS at 10:46

## 2020-08-25 RX ADMIN — SODIUM CHLORIDE, SODIUM LACTATE, POTASSIUM CHLORIDE, AND CALCIUM CHLORIDE 25 ML/HR: 600; 310; 30; 20 INJECTION, SOLUTION INTRAVENOUS at 06:56

## 2020-08-25 RX ADMIN — Medication 1 AMPULE: at 14:49

## 2020-08-25 RX ADMIN — Medication 1 AMPULE: at 21:11

## 2020-08-25 RX ADMIN — DEXMEDETOMIDINE HYDROCHLORIDE 4 MCG: 100 INJECTION, SOLUTION, CONCENTRATE INTRAVENOUS at 09:00

## 2020-08-25 RX ADMIN — PANTOPRAZOLE SODIUM 40 MG: 40 TABLET, DELAYED RELEASE ORAL at 18:01

## 2020-08-25 RX ADMIN — PROPOFOL 50 MG: 10 INJECTION, EMULSION INTRAVENOUS at 11:01

## 2020-08-25 RX ADMIN — FENTANYL CITRATE 25 MCG: 50 INJECTION, SOLUTION INTRAMUSCULAR; INTRAVENOUS at 12:08

## 2020-08-25 RX ADMIN — SODIUM CHLORIDE 125 ML/HR: 900 INJECTION, SOLUTION INTRAVENOUS at 18:54

## 2020-08-25 RX ADMIN — DEXMEDETOMIDINE HYDROCHLORIDE 4 MCG: 100 INJECTION, SOLUTION, CONCENTRATE INTRAVENOUS at 08:50

## 2020-08-25 RX ADMIN — IPRATROPIUM BROMIDE 0.5 MG: 0.5 SOLUTION RESPIRATORY (INHALATION) at 21:46

## 2020-08-25 RX ADMIN — Medication 10 ML: at 14:48

## 2020-08-25 RX ADMIN — Medication 10 ML: at 22:14

## 2020-08-25 RX ADMIN — PROPOFOL 200 MG: 10 INJECTION, EMULSION INTRAVENOUS at 08:26

## 2020-08-25 RX ADMIN — ACETAMINOPHEN 1000 MG: 325 TABLET ORAL at 14:31

## 2020-08-25 RX ADMIN — DEXMEDETOMIDINE HYDROCHLORIDE 8 MCG: 100 INJECTION, SOLUTION, CONCENTRATE INTRAVENOUS at 09:33

## 2020-08-25 RX ADMIN — GABAPENTIN 600 MG: 300 CAPSULE ORAL at 18:00

## 2020-08-25 RX ADMIN — FENTANYL CITRATE 25 MCG: 50 INJECTION, SOLUTION INTRAMUSCULAR; INTRAVENOUS at 11:52

## 2020-08-25 RX ADMIN — VANCOMYCIN HYDROCHLORIDE 1500 MG: 10 INJECTION, POWDER, LYOPHILIZED, FOR SOLUTION INTRAVENOUS at 07:14

## 2020-08-25 RX ADMIN — PREGABALIN 150 MG: 75 CAPSULE ORAL at 06:42

## 2020-08-25 NOTE — BRIEF OP NOTE
Brief Postoperative Note    Patient: Gracy Garcia  YOB: 1960  MRN: [id number]    Date of Procedure: 8/25/2020     Pre-Op Diagnosis: LOW BACK PAIN, RIGHT SIDED SCIATICA, POSTLAMINECTOMY SYNDROME LUMBAR REGION, FORAMINAL STENOSIS OF LUMBAR REION, SYNOVIAL CYST OF LUMBAR FACET JOINT, SPINAL STENOSIS LUMBAR REGION WITH NEUROGENIC CLAUDICATION    Post-Op Diagnosis: Same as preoperative diagnosis. Procedure(s):  ROBOTIC ASSISTED L4-5 POSTERIOR AND LATERAL DECOMPRESSION FUSION, SINGLE POSITION, WITH BONE MARROW ASPIRATION FROM ILIAC CREST    Surgeon(s):  Jaclyn Abraham MD    Surgical Assistant: Physician Assistant: DAPHNE Adkins    Anesthesia: General     Estimated Blood Loss (mL): less than 010     Complications: none    Specimens: * No specimens in log *     Implants:   Implant Name Type Inv.  Item Serial No.  Lot No. LRB No. Used Action   OSTEOAMP FIBERS 10   [id number] [facility] N/A N/A 1 Implanted   TULIP POLYAXL MOD 30MM REDUC -- CREO MIS - SN/A  TULIP POLYAXL MOD 30MM REDUC -- CREO MIS N/A [employer] N/A N/A 2 Implanted   SET SCREW   N/A [employer] N/A N/A 2 Implanted   SCR GENE MOD 7.5X50MM -- CREO - SN/A  SCR GENE MOD 7.5X50MM -- CREO N/A [employer] N/A N/A 1 Implanted   SCR GENE MOD 7.5X45MM -- [place] - SN/A  SCR GENE MOD 7.5X45MM -- [place] N/A [employer] N/A N/A 1 Implanted   60MM SANGEETA   N/A [employer] N/A N/A 1 Implanted   SCR BNE VA 5.5X30MM -- [place] - SN/A  SCR BNE VA 5.5X30MM -- [employer] N/A [employer] N/A N/A 2 Implanted   PLATE THORLUM [id number] [id number] -- [place] - SN/A  PLATE THORLUM E2-M6 [id number] -- [place] N/A [employer] N/A N/A 1 Implanted       Drains: * No LDAs found *    Findings: Stenosis    Electronically Signed by Jayro Irwin MD on 8/25/2020 at 10:52 AM

## 2020-08-25 NOTE — H&P
Progress notes    Expand AllCollapse All    ASSESSMENT:  (M54.5) Low back pain, non-specific  (primary encounter diagnosis)  (M54.31) Right sided sciatica  (M96.1) Postlaminectomy syndrome, lumbar region  (M48.061) Foraminal stenosis of lumbar region  (M71.38) Synovial cyst of lumbar facet joint  (V86.120) Spinal stenosis, lumbar region, with neurogenic claudication         Patient Active Problem List   Diagnosis    Cervical spinal stenosis    Cervical radiculopathy    Cervicalgia    Claudication    Lumbar degenerative disc disease    Lumbar radiculopathy     Spinal stenosis of lumbar region    Low back pain    Bilateral sciatica    Abdominal discomfort    Alcohol abuse    Anxiety    Arthralgia    Chronic bronchitis    Chronic obstructive pulmonary disease with acute exacerbation    Chronic pain syndrome    DM (diabetes mellitus)    Hx of rheumatoid arthritis    Hypertension    Influenza    Pneumonia    Seizures    Sinus tachycardia    Tobacco abuse         Impression: foraminal stenosis on the right at L4-L5 with a synovial cyst causing RLE sciatica     PLAN:  I discussed treatment options with Mr. Kaity Murray today. We discussed that a possible operation only has a 50/50 chance of relieving his low back pain. We also discussed that we do not manage medication long-term and to consult with his PCP.  At this time I scheduled an L4-L5 decompression and fusion, single position and prescribed 600 mg gabapentin TID.      I have discussed the procedure in detail with the patient and mentioned complications, including but not limited to: death, permanent disability, heart attack, stroke, lung injury or infection, blindness, ileus, bladder or bowel problems, ureter injury, bleeding, nerve injury (including numbness, pain and weakness), paralysis (which may be permanent), failure to heal, failure to fuse bone together in fusion procedures, failure to relief symptoms, failure to relief pain, increased pain, need for further surgeries, failure or breakage or hardware, malpositioning of hardware, need to fuse or operate on additional levels determined either during or after surgery, destabilization of the spine (which may require fusion or later surgery), infections (which may or may not require additional surgery), dural tears (tears of the sac holding in nerves and spinal fluid), meningitis, voice changes, vocal cord injury, hoarseness, blood clots, pulmonary embolus, Alex syndrome, recurrent disc herniation, diaphragm paralysis, and anesthetic complications. Comorbidities such as obesity, smoking, rheumatoid arthritis, chronic steroid use and diabetes increase these risks. The patient understands and wants to proceed.      The patient has been prescribed a LSO spinal orthosis pre-operatively for pain relief. The orthosis is medically necessary to reduce pain by restricting mobility of the trunk and to otherwise support weak spinal muscles and/or deformed spine. The patient will meet with our bracing coordinator to be fit for the brace. Follow up after surgery.         Treatment Plan:       Orders Placed This Encounter    Surgical Posting Sheet    BMI >=25 PATIENT INSTRUCTIONS & EDUCATION    gabapentin (NEURONTIN) 600 MG tablet         Follow-up: No follow-ups on file.         HISTORY OF PRESENT ILLNESS:  Ashwin Crane; [id number]   Age: 62 y.o. Sex: male   Pain score: Pain rating =   7 out of 10      Chief Complaint: Pain of the Lower Back        History of present illness:  Ashwin Crane is a 62 y.o. male with complaints of low back pain radiating to the right anterior, occasionally the left anterior thigh, to the knees. His primary concern is hi slow back pain. It is described as stabbing, achy and with knots and is there constantly.  It is worse with walking and better with rest and sitting down. Ashwin Crane has tried pain management, tylenol, baclofen, 600 mg gabapentin TID and cymbalta.  The pain is rated 7 out of 10 on the VAS.            Patient Active Problem List     Diagnosis Date Noted    Bilateral sciatica 01/18/2019    Spinal stenosis of lumbar region 01/03/2019    Low back pain 01/03/2019    Claudication 12/13/2018    Lumbar degenerative disc disease 12/13/2018    Lumbar radiculopathy  12/13/2018    Abdominal discomfort 11/01/2018    Chronic obstructive pulmonary disease with acute exacerbation 10/30/2018    DM (diabetes mellitus) 10/30/2018    Influenza 10/22/2018    Pneumonia 10/22/2018    Sinus tachycardia 10/18/2018    Alcohol abuse 10/10/2018    Anxiety 10/10/2018    Chronic bronchitis 10/10/2018    Chronic pain syndrome 10/10/2018    Hypertension 10/10/2018    Seizures 10/10/2018    Tobacco abuse 10/10/2018    Cervical spinal stenosis 12/21/2017    Cervical radiculopathy 12/21/2017    Cervicalgia 12/21/2017    Arthralgia 09/23/2014    Hx of rheumatoid arthritis 09/23/2014               Family History   Problem Relation Age of Onset    No Known Problems Mother      No Known Problems Father      No Known Problems Brother      No Known Problems Sister      No Known Problems Son      No Known Problems Daughter      No Known Problems Other      Diabetes Neg Hx      Coronary artery disease Neg Hx      Clotting disorder Neg Hx      Anesthesia problems Neg Hx           Social History            Tobacco Use    Smoking status: Current Some Day Smoker       Types: Cigarettes    Smokeless tobacco: Never Used   Substance Use Topics    Alcohol use:  Yes         Medical History        Past Medical History:   Diagnosis Date    Alcoholism      Arthritis, rheumatoid      COPD (chronic obstructive pulmonary disease)      Depression      Hypertension      Stroke              Surgical History         Past Surgical History:   Procedure Laterality Date    JOINT REPLACEMENT        NO RELEVANT SURGERIES        SHOULDER SURGERY                   Current Outpatient Medications:    acetaminophen (TYLENOL) 325 MG tablet, , Disp: , Rfl:     albuterol (2.5 MG/3ML) 0.083% nebulizer solution, Inhale 2.5 mg, Disp: , Rfl:     atorvastatin (LIPITOR) 40 MG tablet, Take 40 mg by mouth daily, Disp: , Rfl:     baclofen (LIORESAL) 10 MG tablet, Take 10 mg by mouth 3 (three) times a day, Disp: , Rfl:     budesonide-formoterol (SYMBICORT) 80-4.5 MCG/ACT inhaler, Inhale 2 puffs 2 times daily, Disp: , Rfl:     diazepam (VALIUM) 10 MG tablet, Take 1 tablet (10 mg total) by mouth See admin instructions take 1 tablet 1 hour before mri, repeat if needed, Disp: 2 tablet, Rfl: 0    diclofenac (VOLTAREN) 25 MG EC tablet, , Disp: , Rfl:     Diclofenac Sodium 1 % gel, , Disp: , Rfl:     dicyclomine (BENTYL) 10 MG capsule, , Disp: , Rfl:     diltiazem (TIAZAC) 360 MG 24 hr capsule, Take 360 mg by mouth daily, Disp: , Rfl:     diltiazem CD (CARDIZEM CD) 360 MG 24 hr capsule, Take 360 mg by mouth daily, Disp: , Rfl:     DULoxetine (CYMBALTA) 60 MG capsule, Take 60 mg by mouth daily, Disp: , Rfl:     fluticasone (FLONASE) 50 MCG/ACT nasal spray, Administer 1 spray into each nostril daily, Disp: , Rfl:     furosemide (LASIX) 40 MG tablet, Take 40 mg by mouth 2 (two) times a day, Disp: , Rfl:     gabapentin (NEURONTIN) 600 MG tablet, Take 1 tablet (600 mg total) by mouth 3 (three) times a day, Disp: 90 tablet, Rfl: 5    levETIRAcetam (KEPPRA) 1000 MG tablet, Take 1,000 mg by mouth 2 (two) times a day, Disp: , Rfl:     lidocaine (LIDODERM) 5 % patch, , Disp: , Rfl:     montelukast (SINGULAIR) 10 MG tablet, , Disp: , Rfl:     naltrexone (DEPADE) 50 MG tablet, Take 50 mg by mouth daily, Disp: , Rfl:     ondansetron (ZOFRAN) 4 MG tablet, Take 4 mg by mouth every 8 (eight) hours as needed for nausea or vomiting, Disp: , Rfl:     predniSONE (DELTASONE) 10 MG tablet, Take 20 mg by mouth 2 (two) times a day, Disp: , Rfl:     tiotropium (SPIRIVA HANDIHALER) 18 MCG per inhalation capsule, Place 1 capsule into inhaler and inhale daily, Disp: , Rfl:     traZODone (DESYREL) 150 MG tablet, , Disp: , Rfl:            Allergies   Allergen Reactions    Varenicline Other (see comments)       Other reaction(s): Seizure  Pt states had a seizure after taking Chantix.            ROS:   No new bowel or bladder incontinence. No fever. No saddle anesthesia.     OBJECTIVE:      Vitals:     07/22/20 1538   BP: 126/75         Body mass index is 30.68 kg/m². , a BMI over 30 is considered obese and a BMI over 40 has been associated with a higher risk of surgical complications.     Constitutional: No acute distress. Well nourished. Respiratory:  No labored breathing. Cardiovascular:  No marked cyanosis. Skin:  No marked skin ulcers/lesions on bilateral upper or lower extremities. Psychiatric: Alert and oriented x3. Inspection: No gross deformity of bilateral upper or lower extremities. Musculoskeletal/Neurological:   Gait/balance:  - Normal. Able to walk on heels and toes. Thoracolumbar spine:  - No tenderness to palpation  - Full range of motion.   Right lower extremity:  - No tenderness to palpation   - Full range of motion  - No pain with internal/external rotation of the hip  - Strength:  - 5 out of 5 to hip flexors  - 5 out of 5 to quads  - 5 out of 5 to TA  - 5 out of 5 to EHL  - 5 out of 5 to Gastroc/Soleus  - Negative straight leg raise  Left lower extremity:  - No tenderness to palpation   - Full range of motion  - No pain with internal/external rotation of the hip  - Strength:  - 5 out of 5 to hip flexors  - 5 out of 5 to quads  - 5 out of 5 to TA  - 5 out of 5 to EHL  - 5 out of 5 to Gastroc/Soleus  - Negative straight leg raise  Sensation:  - Intact to light touch  Reflexes:  - +2 Patellar tendon   - +2 Achilles tendon            RESULTS REVIEWED:          No imaging obtained             I have instructed the patient to continue to work on their physician supervised home exercise program.      This note has been transcribed electronically using voice recognition and a trained scribe. It is believed to be accurate, but may contain errors secondary to technological limitations and other factors.     I, Shahrzad Saucedo MD, personally, performed the services described in this documentation, as scribed in my presence, and it is both accurate and complete.   Scribed by: Nazia Pedraza

## 2020-08-25 NOTE — ANESTHESIA PREPROCEDURE EVALUATION
Relevant Problems   No relevant active problems       Anesthetic History               Review of Systems / Medical History  Patient summary reviewed, nursing notes reviewed and pertinent labs reviewed    Pulmonary    COPD    Sleep apnea        Comments: Home O2   Neuro/Psych     seizures  CVA  Psychiatric history    Comments: H/O ETOH abuse (F10.11)   Spinal stenosis of lumbar region   Drug use: Marijuana, Cocaine  Cardiovascular    Hypertension      CHF        Exercise tolerance: <4 METS  Comments:  6/3/19 \"Negative for ischemia. Preserved LV function. EF 65%. No wall motion abnormalities. \" per Dr Aram Yoon notes 6/3/19.      GI/Hepatic/Renal  Within defined limits              Endo/Other        Arthritis     Other Findings              Physical Exam    Airway  Mallampati: I  TM Distance: > 6 cm  Neck ROM: normal range of motion   Mouth opening: Normal     Cardiovascular    Rhythm: regular  Rate: abnormal        Comments: tachycardia Dental    Dentition: Poor dentition and Upper partial plate     Pulmonary  Breath sounds clear to auscultation               Abdominal  GI exam deferred       Other Findings            Anesthetic Plan    ASA: 3  Anesthesia type: general          Induction: Intravenous  Anesthetic plan and risks discussed with: Patient

## 2020-08-25 NOTE — PROGRESS NOTES
Problem: Mobility Impaired (Adult and Pediatric)  Goal: *Acute Goals and Plan of Care (Insert Text)  Description: FUNCTIONAL STATUS PRIOR TO ADMISSION: Patient was independent and active without use of DME.    HOME SUPPORT PRIOR TO ADMISSION: The patient lived alone with friends/family to provide assistance. Physical Therapy Goals  Initiated 8/25/2020    1. Patient will move from supine to sit and sit to supine  in bed with independence within 4 days. 2. Patient will perform sit to stand with independence within 4 days. 3. Patient will ambulate with independence for 300 feet with the least restrictive device within 4 days. 4. Patient will ascend/descend 5 stairs with bilateral handrail(s) with independence within 4 days. 5. Patient will verbalize and demonstrate understanding of spinal precautions (No bending, lifting greater than 5 lbs, or twisting; log-roll technique; frequent repositioning as instructed) within 4 days. Outcome: Not Met   PHYSICAL THERAPY EVALUATION  Patient: Orlando Dykes (72 y.o. male)  Date: 8/25/2020  Primary Diagnosis: Spinal stenosis of lumbar region with neurogenic claudication [M48.062]  Procedure(s) (LRB):  ROBOTIC ASSISTED L4-5 POSTERIOR AND LATERAL DECOMPRESSION FUSION, SINGLE POSITION, WITH BONE MARROW ASPIRATION FROM ILIAC CREST (N/A) Day of Surgery   Precautions: BLT       ASSESSMENT  Based on the objective data described below, the patient presents with decreased strength, mobility and endurance. Patient was received in supine after having finished lunch. Patient was agreeable to PT but complained of high pain levels particularly in L hand at IV site. Patient was able to perform all bed mobility with contact guard to min assist x 1. BP and O2 saturation on 2L remained WNL throughout therapy session. Patient able to ambulate around the room with the use of a rolling walker.       Current Level of Function Impacting Discharge (mobility/balance): contact guard to min assist x 1    Functional Outcome Measure: The patient scored 70 on the Barthel outcome measure which is indicative of 30% decreased function. Other factors to consider for discharge: Lives alone but family and friends serve as support system. Needs back brace     Patient will benefit from skilled therapy intervention to address the above noted impairments. PLAN :  Recommendations and Planned Interventions: bed mobility training, transfer training, and gait training      Frequency/Duration: Patient will be followed by physical therapy:  twice daily to address goals. Recommendation for discharge: (in order for the patient to meet his/her long term goals)  Outpatient physical therapy follow up recommended for spinal stenosis    This discharge recommendation:  Has not yet been discussed the attending provider and/or case management    IF patient discharges home will need the following DME: patient may require rolling walker         SUBJECTIVE:   Patient stated Abie Booty me, this is painful.     OBJECTIVE DATA SUMMARY:   HISTORY:    Past Medical History:   Diagnosis Date    Arthritis     Chronic kidney disease     Chronic obstructive pulmonary disease (HCC)     O2 2L sitting - 3L walking -- 24hr/day    Chronic pain     BACK AND LEGS    Elevated hemoglobin A1c 9/25/2019    H/O cardiovascular stress test 06/03/2019    6/3/19 \"Negative for ischemia. Preserved LV function. EF 65%. No wall motion abnormalities. \" per Dr Dillon Valerio notes 6/3/19.    H/O ETOH abuse     Heart failure ([address]     Hypertension     Ill-defined condition     ANXIETY    Ill-defined condition 2009    LUNG COLLAPSED    Ill-defined condition     USES O2 3L AT NIGHT    MRSA carrier 9/26/2019    Opioid dependence ([address]     per PCP clearance 2020    Psychiatric disorder     DEPRESSION    Seizures ([address] 2014    Sleep apnea 2020    needs CPAP - does not have it yet.     Stroke [facility]) 2015    \"mini-stroke'     Past Surgical History: Procedure Laterality Date    HX HERNIA REPAIR Right 2015    HX HERNIA REPAIR Left 2015    HX OTHER SURGICAL  2009    CHEST TUBE WITH COLLAPSED LUNGS    HX ROTATOR CUFF REPAIR Right 2016    HX ROTATOR CUFF REPAIR Left 2016       Personal factors and/or comorbidities impacting plan of care: depression, alcohol abuse    Home Situation  Home Environment: Private residence  # Steps to Enter: 5  Rails to Enter: Yes  Hand Rails : Bilateral  Wheelchair Ramp: No  One/Two Story Residence: One story  Living Alone: Yes  Support Systems: Family member(s), Friends \ neighbors  Patient Expects to be Discharged to[de-identified] Private residence  Current DME Used/Available at Home: None  Tub or Shower Type: Tub/Shower combination    EXAMINATION/PRESENTATION/DECISION MAKING:   Critical Behavior:  Neurologic State: Drowsy, Alert  Orientation Level: Oriented X4  Cognition: Follows commands     Range Of Motion:  AROM: Within functional limits           PROM: Within functional limits           Strength:    Strength: Generally decreased, functional                    Tone & Sensation:                                  Coordination:     Vision:      Functional Mobility:  Bed Mobility:  Rolling: Contact guard assistance  Supine to Sit: Minimum assistance;Assist x1  Sit to Supine: Minimum assistance;Assist x1  Scooting: Contact guard assistance  Transfers:  Sit to Stand: Minimum assistance;Assist x1  Stand to Sit: Contact guard assistance                       Balance:   Sitting: Intact; Without support  Standing: With support; Impaired  Standing - Static: Good  Standing - Dynamic : Fair  Ambulation/Gait Training:  Distance (ft): 20 Feet (ft)  Assistive Device: Gait belt;Walker, rolling  Ambulation - Level of Assistance: Contact guard assistance     Gait Description (WDL): Exceptions to WDL  Gait Abnormalities: Antalgic        Base of Support: Widened  Stance: Right increased; Left increased  Speed/Dee: Pace decreased (<100 feet/min)  Step Length: Left shortened;Right shortened                  Functional Measure:  Barthel Index:    Bathin  Bladder: 10  Bowels: 10  Groomin  Dressin  Feeding: 10  Mobility: 10  Stairs: 5  Toilet Use: 5  Transfer (Bed to Chair and Back): 10  Total: 70/100       The Barthel ADL Index: Guidelines  1. The index should be used as a record of what a patient does, not as a record of what a patient could do. 2. The main aim is to establish degree of independence from any help, physical or verbal, however minor and for whatever reason. 3. The need for supervision renders the patient not independent. 4. A patient's performance should be established using the best available evidence. Asking the patient, friends/relatives and nurses are the usual sources, but direct observation and common sense are also important. However direct testing is not needed. 5. Usually the patient's performance over the preceding 24-48 hours is important, but occasionally longer periods will be relevant. 6. Middle categories imply that the patient supplies over 50 per cent of the effort. 7. Use of aids to be independent is allowed. Sanjeev Kuhn., Barthel, D.W. (2200). Functional evaluation: the Barthel Index. [facility] (14)2. Octavia Godinez daniel Yamile, J.J.M.F, Henrry Em., Elisa Ambriz., [place], [address] [place] [address] (). Measuring the change indisability after inpatient rehabilitation; comparison of the responsiveness of the Barthel Index and Functional Belva Measure. Journal of Neurology, Neurosurgery, and Psychiatry, 66(4), 529-515. HENOK Nicholas.HUI.JERSON, ANDRY Duke, & Manjeet Acosta M.A. (2004.) Assessment of post-stroke quality of life in cost-effectiveness studies: The usefulness of the Barthel Index and the EuroQoL-5D.  Quality of Life Research, 15, 241-14           Physical Therapy Evaluation Charge Determination   History Examination Presentation Decision-Making   MEDIUM  Complexity : 1-2 comorbidities / personal factors will impact the outcome/ POC  LOW Complexity : 1-2 Standardized tests and measures addressing body structure, function, activity limitation and / or participation in recreation  LOW Complexity : Stable, uncomplicated  Other outcome measures 70  MEDIUM      Based on the above components, the patient evaluation is determined to be of the following complexity level: LOW     Activity Tolerance:   Fair and requires rest breaks  Please refer to the flowsheet for vital signs taken during this treatment. After treatment patient left in no apparent distress:   Supine in bed, Call bell within reach, and Side rails x 3    COMMUNICATION/EDUCATION:   The patients plan of care was discussed with: Registered nurse and Case management. Patient/family agree to work toward stated goals and plan of care.     Thank you for this referral.  Tony Vega   Time Calculation: 20 mins

## 2020-08-25 NOTE — PERIOP NOTES
Patient: Jovi Rodriguez MRN: [id number]  SSN: [id number]   YOB: 1960  Age: 62 y.o. Sex: male     Patient is status post Procedure(s):  ROBOTIC ASSISTED L4-5 POSTERIOR AND LATERAL DECOMPRESSION FUSION, SINGLE POSITION, WITH BONE MARROW ASPIRATION FROM ILIAC CREST. Surgeon(s) and Role:     * Char Sarah MD - Primary    Local/Dose/Irrigation:  20ml lidocaine 1% with epi  60ml 0.5% ropivicaine  450ml Irrisept for irrigation (Lot# [id number], Exp:04/30/2022)  10ml Floseal used prn intra op (Lot# [id number], Exp: 12/10/2021)                  Peripheral IV 08/25/20 Left Hand (Active)   Site Assessment Clean, dry, & intact 08/25/20 0655   Phlebitis Assessment 0 08/25/20 0655   Infiltration Assessment 0 08/25/20 0655   Dressing Status Clean, dry, & intact 08/25/20 0655   Dressing Type Tape;Transparent 08/25/20 0655   Hub Color/Line Status Infusing;Pink;Patent 08/25/20 0655       Peripheral IV Right Hand (Active)            Airway - Endotracheal Tube 08/25/20 (Active)                   Dressing/Packing:  Wound Flank Left-Dressing Type: Topical skin adhesive/glue (08/25/20 0922)  Wound Back-Dressing Type: Topical skin adhesive/glue (08/25/20 1016)  Wound Hip Left Pin site-Dressing Type:  Adhesive wound dressing (Band-Aid) (08/25/20 0936)    Splint/Cast:  ]  \

## 2020-08-25 NOTE — OP NOTES
[address]  OPERATIVE REPORT    Name:  Mary Prasad  MR#:  [id number]  :  1960  ACCOUNT #:  [de-identified]  DATE OF SERVICE:  2020    PREOPERATIVE DIAGNOSES:  1. Lumbar spinal stenosis, L4-L5. 2.  Foraminal stenosis. 3.  Previous laminectomy, L4-L5. 4.  Lumbago. 5.  Sciatica. POSTOPERATIVE DIAGNOSES:  1. Lumbar spinal stenosis, L4-L5. 2.  Foraminal stenosis. 3.  Previous laminectomy, L4-L5. 4.  Lumbago. 5.  Sciatica. PROCEDURE PERFORMED:  1. L4-L5 anterior fusion. 2. L4-L5 anterior instrumentation. 3. X9-W0 application of interbody biomechanical device. 4. L4-L5 posterior fusion. 5. L4-L5 posterior instrumentation. 6.  Use of Globus robotic system for placement of pedicle screws. 7.  Use of allograft bone for spine fusion. 8.  Bone marrow aspirate from the left anterior superior iliac spine for spinal fusion augmentation. SURGEON:  Dorcus Bloch, MD    FIRST ASSISTANT:  DAPHNE Melendez    ANESTHESIA:  General.    COMPLICATIONS:  None. SPECIMENS REMOVED:  None. IMPLANTS:  A Globus Creo pedicle screw system, a Globus Comal plate, and a Globus RISE-L interbody biomechanical device. ESTIMATED BLOOD LOSS:  Less than 100 mL. DRAINS:  None. INDICATION FOR PROCEDURE:  The patient is a pleasant 50-year-old gentleman with lumbar spinal stenosis in the foraminal area, who had undergone a previous decompression. He has lumbago and sciatica that has failed to improve with nonoperative treatment. At this point, he elected to proceed with surgical intervention. I have given him warnings about possible complications including, but not limited to, pain, scar, bleeding, infection, nonunion, damage to surrounding structures, death, paralysis, blindness, stroke. He understands and wants to proceed.     NARRATIVE OF THE PROCEDURE:  After informed consent was obtained and the operative site was properly marked, the patient was moved back to operating room and underwent general endotracheal anesthesia. He was positioned on the lateral decubitus with the left side up using the regular OR bed. All the bony extremities were well padded. He was safely secured to the bed. Fluoroscopy was used to heena the level of the incision. We then proceeded to prep and drape in the usual manner. Time-out was obtained verifying that this was the correct patient, the correct surgery, the correct site as well as that he had received IV Ancef within 30 minutes of the incision. I then proceeded to perform a standard anterior approach to the lumbar spine for an OLIF at L4-L5. I was able to split abdominal musculature in layers, entered the retroperitoneal space, retracted the psoas muscle posteriorly and abdominal contents anteriorly, and find the L4-L5 disk space. Once the L4-L5 disk space was verified under fluoroscopy, through a separate fascial incision aspirated 60 mL of bone marrow from the left anterior-superior iliac spine. I then proceeded to place a navigation marker on the left anterior-superior iliac spine and one on the left posterior-superior iliac spine and brought in fluoroscopy to register under [email or web address] robotic system at L4 and L5 on both AP and lateral views. Once that was completed and the system was registered, [employer] robot was brought in place. It guided us to perform a Anette approach on the left side. I was able to dissect down to the bone and used a high-speed drill to heena the entry hole for the pedicle initially at L4 followed by L5, followed by using a drill, and finally, placing the premeasured screw at both L4 and L5. Once the screws were in place, I did turn to the anterior approach. I proceeded to use a 15-blade to perform a box annulotomy. The annulus was removed with a pituitary and the disk was removed with a box shaver for a full discectomy.   I then used a trial to determine the size for the implant and while the implant was packed with allograft bone soaked in bone marrow aspirate for the anterior fusion, I proceeded to use a curette and a pituitary to finish the removing the disk debris leaving behind punctate bleeding bone. The cage was then inserted on the L4-L5 interspace and deployed to its final height. I then used a funnel to pack bone graft for the anterior fusion. Once this was completed, a plate was selected, and then screws were drilled at L4 and L5 locking the plate in position. Once this was completed, I closed the abdominal musculature with #1 Vicryl figure-of-eight interrupted sutures, followed by irrigating subcutaneous, closed subcutaneous with 2-0 Vicryl, and the skin with a 3-0 running Monocryl and Dermabond. On the posterior incision, we proceeded to measure for the abram. I decorticated the posterior elements at L4 and L5 with a high-speed drill proceeded then to place allograft bone soaked in bone marrow aspirate for the posterior fusion followed by placing the rods and locking caps, and final tightened the construct in place. The fascia was then closed with 0 V-Loc followed by irrigating subcutaneous, closed subcutaneous with 2-0 Vicryl, the skin with 3-0 running Monocryl and Dermabond. Sterile dressing was applied. The patient was then awakened and transferred to PACU in stable condition. POSTOPERATIVE PLAN:  The patient is going to remain here overnight. We are going to give him SCDs and DEJA hose for DVT prophylaxis and Ancef for infection prophylaxis.         MD IWONA [email or web address]  D:  08/25/2020 11:04  T:  08/25/2020 16:49  JOB #:  [id number]  CC:  [facility] [place] [address] Orthopedic Billing Office

## 2020-08-25 NOTE — DISCHARGE INSTRUCTIONS
[facility]    Discharge Instruction Sheet: POSTERIOR SPINAL FUSION    DR. Marie Poon    Pain control:   Typically, we will prescribe a narcotic usually 1-2 tabs every four hours is    sufficient for the pain. Most patients need this only for the first few weeks. You   should discontinue this as the pain decreases. You should not drive while taking any narcotic pain medications. Constipation  Pain medicines and anesthesia can be constipating-this can be prevented by gentle physical activity and drinking plenty of fluid. It should be treated with over-the-counter medications such as Miralax or suppositories, and/or Fleets enema. You should have a bowel movement at least every other day following surgery. Incision care     Keep this area clean and dry. Your dressing is designed to stay in place for 5-7 days. You will be sent home with one additional dressing to change at that time. Leave this new dressing in place until our follow up visit in the office in about 10-14 days. If staples are in place, they should be removed about 14-20 days after surgery. You may shower with this impermeable dressing in place. DO NOT take a tub bath or go swimming until cleared by your doctor. DO NOT apply lotions, oils, or creams to incision. To increase and promote healing:   Stop Smoking (or at least cut back on smoking).  Eat a well-balanced diet (high in protein and vitamin C)   If your appetite is poor, consider nutritional supplements like Ensure, Glucerna, or [place] Instant Breakfast.   If you are diabetic, controlling you blood sugars is very important to prevent infection and promote wound healing. Nutrition:   If you were on a supplement such as Ensure or Glucerna) while in the hospital, please continue using them with each meal for the next 30 days.    Eat a well-balanced diet - High in protein, high in vitamins and minerals, especially vitamin C and zinc.     Restrictions:   Remember your \"BLT's\"    1. Limited bending at waist    2. Lift no more than 10 pounds    3. No Twisting     If you were given a brace, wear it when out of bed. Warning signs : Please call your physician immediately at [phone] if you have   Bleeding from incision that is constant.  Change in mental status (unusual behavior or confusion)   If your incision develops redness or swelling   Change in wound drainage (increase in amount, color, or foul odor)   [place] over 101.5 degrees Fahrenheit    Headache that is not relieved with pain medication   Tenderness or redness in the calf of your leg    Emergency: CALL 911 if you have   Shortness of breath   Chest pain   Localized chest pain when coughing or taking a deep breath    Follow-up  Please call Dr. Ana Madsen office for a follow up appointment in 2-3 weeks at [phone]. You can return to work when cleared by a physician. During normal business hours you may reach Dr. Grazyna Francisco' team directly at [phone] if you have concerns or questions.     Regis Marcelino

## 2020-08-25 NOTE — PERIOP NOTES
Handoff Report from Operating Room to PACU    Report received from Jose Salinas RN and CHINMAY Olivo CRNA regarding Leigh Avelar. Surgeon(s):  Susana Hanson MD  And Procedure(s) (LRB):  ROBOTIC ASSISTED L4-5 POSTERIOR AND LATERAL DECOMPRESSION FUSION, SINGLE POSITION, WITH BONE MARROW ASPIRATION FROM ILIAC CREST (N/A)  confirmed   with allergies and dressings discussed. Anesthesia type, drugs, patient history, complications, estimated blood loss, vital signs, intake and output, and last pain medication, lines, reversal medications and temperature were reviewed.

## 2020-08-25 NOTE — ANESTHESIA POSTPROCEDURE EVALUATION
Procedure(s):  ROBOTIC ASSISTED L4-5 POSTERIOR AND LATERAL DECOMPRESSION FUSION, SINGLE POSITION, WITH BONE MARROW ASPIRATION FROM ILIAC CREST. general    Anesthesia Post Evaluation        Patient location during evaluation: PACU  Patient participation: complete - patient participated  Level of consciousness: awake and alert  Pain management: adequate  Airway patency: patent  Anesthetic complications: no  Cardiovascular status: acceptable  Respiratory status: acceptable  Hydration status: acceptable  Comments: Seen, no complaints, pending transfer   Post anesthesia nausea and vomiting:  none  Final Post Anesthesia Temperature Assessment:  Normothermia (36.0-37.5 degrees C)      INITIAL Post-op Vital signs:   Vitals Value Taken Time   /78 8/25/2020 12:00 PM   Temp 36.8 °C (98.2 °F) 8/25/2020 11:17 AM   Pulse 91 8/25/2020 12:15 PM   Resp 9 8/25/2020 12:15 PM   SpO2 95 % 8/25/2020 12:15 PM   Vitals shown include unvalidated device data.

## 2020-08-25 NOTE — PROGRESS NOTES
Ortho / Neurosurgery NP Note    POD# 0  s/p ROBOTIC ASSISTED L4-5 POSTERIOR AND LATERAL DECOMPRESSION FUSION, SINGLE POSITION, WITH BONE MARROW ASPIRATION FROM ILIAC CREST     Pt resting in bed. Drowsy, eyes open to voice, falling asleep during conversation. PTA, patient experienced low back pain with radiating pain down LLE, and sometimes RLE worsened by activity  Post-operatively, patient denies pain down BLE, reports expected incisional back pain - relieved by prn oxycodone  Tolerating regular diet, denies nausea. VSS Afebrile. 2L NC.  PTA: wears 2L during day and 3L O2 at night   BP rechecked 137/94    Visit Vitals  BP (!) 156/97   Pulse 99   Temp 97.8 °F (36.6 °C)   Resp 18   Wt 98.6 kg (217 lb 6 oz)   SpO2 92%   BMI 30.32 kg/m²       Voiding status: due to void  Output (mL)  Last Bowel Movement Date: 08/24/20 (08/25/20 8478)      Labs    Lab Results   Component Value Date/Time    HGB 11.3 (L) 08/10/2020 04:54 PM      Lab Results   Component Value Date/Time    INR 1.0 08/10/2020 04:54 PM      Lab Results   Component Value Date/Time    Sodium 140 08/10/2020 04:54 PM    Potassium 3.8 08/10/2020 04:54 PM    Chloride 106 08/10/2020 04:54 PM    CO2 29 08/10/2020 04:54 PM    Glucose 99 08/10/2020 04:54 PM    BUN 12 08/10/2020 04:54 PM    Creatinine 1.53 (H) 08/10/2020 04:54 PM    Calcium 9.6 08/10/2020 04:54 PM     Recent Glucose Results:   Lab Results   Component Value Date/Time    GLUCPOC 92 08/25/2020 06:53 AM           Body mass index is 30.32 kg/m². : A BMI > 30 is classified as obesity and > 40 is classified as morbid obesity. Dressing c.d.i  Calves soft and supple; No pain with passive stretch  Sensation and motor intact - PF/DF/EHL intact 4/5, difficulty lifting left leg off bed  SCDs for mechanical DVT proph while in bed     PLAN:  1) Neurovascular assessment q4 hours   2) PT/OT - [place] brace when oob  3) Pain control - scheduled tylenol, prn oxycodone   4) COPD - 2L  O2 during day and 3L at night. Symbicort and prn albuterol. 5) HTN - likely worsened by pain and recent activity. Resume diltiazem this evening . 6) Readniess for discharge:     [x] Vital Signs stable    [] Hgb stable    [] + Voiding    [x] Wound intact, drainage minimal    [x] Tolerating PO intake     [] Cleared by PT (OT if applicable)     [] Stair training completed (if applicable)    [] Independent / Contact Guard Assist (household distance)     [] Bed mobility     [] Car transfers     [] ADLs    [x] Adequate pain control on oral medication alone     Discharge pending voiding status & PT/OT clearance. Patient expressed concerns about going home, states he lives alone and has no friends/family to assist him. States he lived in nursing home for 1.5 years and was discharged home in May. Since then he has been able to care for himself independently and has discarded previous assistive devices. He will most likely need RW prior to discharge.       Jose Caputo NP

## 2020-08-25 NOTE — PERIOP NOTES
TRANSFER - OUT REPORT:    Verbal report given to ROSLYN Obrien on Daniella Ramachandran  being transferred to [address] for routine post - op       Report consisted of patients Situation, Background, Assessment and   Recommendations(SBAR). Information from the following report(s) SBAR, Kardex, OR Summary, Intake/Output, MAR and Cardiac Rhythm NSR was reviewed with the receiving nurse. Opportunity for questions and clarification was provided.       Patient transported with:   O2 @ 2 liters  Tech

## 2020-08-26 ENCOUNTER — APPOINTMENT (OUTPATIENT)
Dept: GENERAL RADIOLOGY | Age: 60
DRG: 304 | End: 2020-08-26
Attending: ORTHOPAEDIC SURGERY
Payer: MEDICAID

## 2020-08-26 LAB — HGB BLD-MCNC: 10.6 G/DL (ref 12.1–17)

## 2020-08-26 PROCEDURE — 74011000250 HC RX REV CODE- 250: Performed by: ORTHOPAEDIC SURGERY

## 2020-08-26 PROCEDURE — 97530 THERAPEUTIC ACTIVITIES: CPT

## 2020-08-26 PROCEDURE — 97116 GAIT TRAINING THERAPY: CPT

## 2020-08-26 PROCEDURE — 85018 HEMOGLOBIN: CPT

## 2020-08-26 PROCEDURE — 74011250636 HC RX REV CODE- 250/636: Performed by: ORTHOPAEDIC SURGERY

## 2020-08-26 PROCEDURE — 74011250637 HC RX REV CODE- 250/637: Performed by: ORTHOPAEDIC SURGERY

## 2020-08-26 PROCEDURE — 36415 COLL VENOUS BLD VENIPUNCTURE: CPT

## 2020-08-26 PROCEDURE — 72100 X-RAY EXAM L-S SPINE 2/3 VWS: CPT

## 2020-08-26 PROCEDURE — 94640 AIRWAY INHALATION TREATMENT: CPT

## 2020-08-26 PROCEDURE — 97535 SELF CARE MNGMENT TRAINING: CPT | Performed by: OCCUPATIONAL THERAPIST

## 2020-08-26 PROCEDURE — L0627 LO SAG RI AN/POS PNL PRE CST: HCPCS

## 2020-08-26 PROCEDURE — 77010033678 HC OXYGEN DAILY

## 2020-08-26 PROCEDURE — 65270000029 HC RM PRIVATE

## 2020-08-26 PROCEDURE — 94760 N-INVAS EAR/PLS OXIMETRY 1: CPT

## 2020-08-26 PROCEDURE — 97165 OT EVAL LOW COMPLEX 30 MIN: CPT | Performed by: OCCUPATIONAL THERAPIST

## 2020-08-26 PROCEDURE — 77030041034 HC KT HIP PATT -B

## 2020-08-26 RX ORDER — IPRATROPIUM BROMIDE 0.5 MG/2.5ML
0.5 SOLUTION RESPIRATORY (INHALATION)
Status: DISCONTINUED | OUTPATIENT
Start: 2020-08-27 | End: 2020-08-27 | Stop reason: HOSPADM

## 2020-08-26 RX ORDER — ACETAMINOPHEN 500 MG
1000 TABLET ORAL EVERY 6 HOURS
Qty: 112 TAB | Refills: 0 | Status: SHIPPED | OUTPATIENT
Start: 2020-08-26 | End: 2020-09-09

## 2020-08-26 RX ORDER — POLYETHYLENE GLYCOL 3350 17 G/17G
17 POWDER, FOR SOLUTION ORAL DAILY
Qty: 14 PACKET | Refills: 0 | Status: SHIPPED | OUTPATIENT
Start: 2020-08-27 | End: 2020-09-10

## 2020-08-26 RX ORDER — AMOXICILLIN 250 MG
1 CAPSULE ORAL 2 TIMES DAILY
Qty: 28 TAB | Refills: 0 | Status: SHIPPED | OUTPATIENT
Start: 2020-08-26 | End: 2020-09-09

## 2020-08-26 RX ORDER — OXYCODONE HYDROCHLORIDE 5 MG/1
5-10 TABLET ORAL
Qty: 60 TAB | Refills: 0 | Status: SHIPPED | OUTPATIENT
Start: 2020-08-26 | End: 2020-09-09

## 2020-08-26 RX ORDER — OXYCODONE HYDROCHLORIDE 5 MG/1
5-10 TABLET ORAL
Qty: 60 TAB | Refills: 0 | Status: SHIPPED | OUTPATIENT
Start: 2020-08-26 | End: 2020-08-26

## 2020-08-26 RX ADMIN — ACETAMINOPHEN 1000 MG: 325 TABLET ORAL at 05:48

## 2020-08-26 RX ADMIN — Medication 10 ML: at 05:49

## 2020-08-26 RX ADMIN — PYRIDOXINE HCL TAB 50 MG 50 MG: 50 TAB at 08:44

## 2020-08-26 RX ADMIN — BUSPIRONE HYDROCHLORIDE 15 MG: 5 TABLET ORAL at 21:25

## 2020-08-26 RX ADMIN — OXYCODONE HYDROCHLORIDE 15 MG: 5 TABLET ORAL at 10:39

## 2020-08-26 RX ADMIN — DIAZEPAM 5 MG: 5 TABLET ORAL at 08:51

## 2020-08-26 RX ADMIN — GABAPENTIN 600 MG: 300 CAPSULE ORAL at 16:04

## 2020-08-26 RX ADMIN — IPRATROPIUM BROMIDE 0.5 MG: 0.5 SOLUTION RESPIRATORY (INHALATION) at 03:32

## 2020-08-26 RX ADMIN — FUROSEMIDE 40 MG: 40 TABLET ORAL at 08:45

## 2020-08-26 RX ADMIN — IPRATROPIUM BROMIDE 0.5 MG: 0.5 SOLUTION RESPIRATORY (INHALATION) at 12:50

## 2020-08-26 RX ADMIN — OXYCODONE HYDROCHLORIDE 5 MG: 5 TABLET ORAL at 17:02

## 2020-08-26 RX ADMIN — DOCUSATE SODIUM - SENNOSIDES 1 TABLET: 50; 8.6 TABLET, FILM COATED ORAL at 08:44

## 2020-08-26 RX ADMIN — OXYCODONE HYDROCHLORIDE 15 MG: 5 TABLET ORAL at 14:08

## 2020-08-26 RX ADMIN — WATER 2 G: 1 INJECTION INTRAMUSCULAR; INTRAVENOUS; SUBCUTANEOUS at 00:00

## 2020-08-26 RX ADMIN — POLYETHYLENE GLYCOL 3350 17 G: 17 POWDER, FOR SOLUTION ORAL at 08:43

## 2020-08-26 RX ADMIN — ACETAMINOPHEN 1000 MG: 325 TABLET ORAL at 17:04

## 2020-08-26 RX ADMIN — NALTREXONE HYDROCHLORIDE 50 MG: 50 TABLET, FILM COATED ORAL at 08:43

## 2020-08-26 RX ADMIN — ARFORMOTEROL TARTRATE: 15 SOLUTION RESPIRATORY (INHALATION) at 20:56

## 2020-08-26 RX ADMIN — OXYCODONE HYDROCHLORIDE 15 MG: 5 TABLET ORAL at 20:06

## 2020-08-26 RX ADMIN — LEVETIRACETAM 1000 MG: 500 TABLET ORAL at 08:45

## 2020-08-26 RX ADMIN — Medication 1 AMPULE: at 20:07

## 2020-08-26 RX ADMIN — ACETAMINOPHEN 1000 MG: 325 TABLET ORAL at 23:25

## 2020-08-26 RX ADMIN — DULOXETINE HYDROCHLORIDE 120 MG: 30 CAPSULE, DELAYED RELEASE ORAL at 08:44

## 2020-08-26 RX ADMIN — CYCLOBENZAPRINE 10 MG: 10 TABLET, FILM COATED ORAL at 10:39

## 2020-08-26 RX ADMIN — PANTOPRAZOLE SODIUM 40 MG: 40 TABLET, DELAYED RELEASE ORAL at 08:44

## 2020-08-26 RX ADMIN — ACETAMINOPHEN 1000 MG: 325 TABLET ORAL at 00:00

## 2020-08-26 RX ADMIN — ARFORMOTEROL TARTRATE: 15 SOLUTION RESPIRATORY (INHALATION) at 07:45

## 2020-08-26 RX ADMIN — IPRATROPIUM BROMIDE 0.5 MG: 0.5 SOLUTION RESPIRATORY (INHALATION) at 07:45

## 2020-08-26 RX ADMIN — ATORVASTATIN CALCIUM 40 MG: 40 TABLET, FILM COATED ORAL at 21:25

## 2020-08-26 RX ADMIN — IPRATROPIUM BROMIDE 0.5 MG: 0.5 SOLUTION RESPIRATORY (INHALATION) at 20:57

## 2020-08-26 RX ADMIN — TRAZODONE HYDROCHLORIDE 50 MG: 50 TABLET ORAL at 23:26

## 2020-08-26 RX ADMIN — IPRATROPIUM BROMIDE 0.5 MG: 0.5 SOLUTION RESPIRATORY (INHALATION) at 16:50

## 2020-08-26 RX ADMIN — Medication 1 AMPULE: at 08:53

## 2020-08-26 RX ADMIN — SENNOSIDES 17.2 MG: 8.6 TABLET, FILM COATED ORAL at 08:44

## 2020-08-26 RX ADMIN — GABAPENTIN 600 MG: 300 CAPSULE ORAL at 08:45

## 2020-08-26 RX ADMIN — MONTELUKAST 10 MG: 10 TABLET, FILM COATED ORAL at 08:47

## 2020-08-26 RX ADMIN — BUSPIRONE HYDROCHLORIDE 15 MG: 5 TABLET ORAL at 16:04

## 2020-08-26 RX ADMIN — OXYCODONE HYDROCHLORIDE 15 MG: 5 TABLET ORAL at 17:03

## 2020-08-26 RX ADMIN — BUSPIRONE HYDROCHLORIDE 15 MG: 5 TABLET ORAL at 08:45

## 2020-08-26 RX ADMIN — ACETAMINOPHEN 1000 MG: 325 TABLET ORAL at 14:08

## 2020-08-26 RX ADMIN — Medication 10 ML: at 21:26

## 2020-08-26 RX ADMIN — FERROUS SULFATE TAB 325 MG (65 MG ELEMENTAL FE) 325 MG: 325 (65 FE) TAB at 08:45

## 2020-08-26 RX ADMIN — GABAPENTIN 600 MG: 300 CAPSULE ORAL at 21:25

## 2020-08-26 RX ADMIN — SENNOSIDES 17.2 MG: 8.6 TABLET, FILM COATED ORAL at 17:03

## 2020-08-26 RX ADMIN — FAMOTIDINE 20 MG: 20 TABLET, FILM COATED ORAL at 08:45

## 2020-08-26 RX ADMIN — PANTOPRAZOLE SODIUM 40 MG: 40 TABLET, DELAYED RELEASE ORAL at 16:04

## 2020-08-26 RX ADMIN — LEVETIRACETAM 1000 MG: 500 TABLET ORAL at 17:03

## 2020-08-26 RX ADMIN — OXYCODONE HYDROCHLORIDE 10 MG: 5 TABLET ORAL at 07:06

## 2020-08-26 RX ADMIN — Medication 10 ML: at 13:26

## 2020-08-26 RX ADMIN — OXYCODONE HYDROCHLORIDE 15 MG: 5 TABLET ORAL at 23:25

## 2020-08-26 RX ADMIN — DILTIAZEM HYDROCHLORIDE 360 MG: 180 CAPSULE, COATED, EXTENDED RELEASE ORAL at 20:06

## 2020-08-26 RX ADMIN — PYRIDOXINE HCL TAB 50 MG 50 MG: 50 TAB at 17:03

## 2020-08-26 NOTE — PROGRESS NOTES
Transition of Care  · Ortho follow-up 2 weeks   · HH: pending review  · RW: pending review    CM notified of Pt needing HH and a rolling walker at discharge. Referral was sent to [facility] for his rolling walker and DONNIE WHITING [facility] for [facility] services. Awaiting responses. CM acknowledge inpatient admission. CM will follow pt for consults and any needs prior to discharge. If any concerns or questions arise pertaining to pt discharge, please contact unit CM.         Raffi Barrett, [id number]

## 2020-08-26 NOTE — PROGRESS NOTES
Occupational Therapy  Orders received and medical record reviewed. PTA  reports that pt is doing well this date during ambulation and negotiating stairs. Pt participated in OT Evaluation. Pt was educated in using his BLT precautions during every day activities as well as home safety and fall prevention. Pt was issued a kit of adaptive aids to be able to adhere to precautions during adls. Pt was educated and able to don/doff and adjust his back brace. Pt demonstrated and veralized knowledge/teach back this date. Pt was able to perform adl tasks independently, using RW and adaptive aids, and with set up/Supervision. Pt reports that since he lives alone, he feels that he needs to go to rehab/snf prior to home. Full OT note to follow.

## 2020-08-26 NOTE — PROGRESS NOTES
Bedside shift change report given to Bernardo Vee RN by Murray Abraham. Report included the following information SBAR.

## 2020-08-26 NOTE — ADT AUTH CERT NOTES
Please call or fax with decision on following procedures: 
 
  
Procedures: AR SPINE Nirali Naqvi [[id number]], AR [address] [facility] SPC W/ARTHRD [[id number]], AR ALLOGRAFT FOR SPINE SURGERY ONLY MORSELIZED [[id number]], AR LUMBAR SPINE FUSION,ANTER APPRCH [[id number]], AR INSERT VERT FIX DEV,ANT,2-3 SGMTS [[id number]], AR ARTHRODESIS POSTERIOR/POSTEROLATERAL LUMBAR [[id number]], SPINE LUMBAR LAMINECTOMY LATERAL WITH INSTRUMENTATION ROBOTIC ASSISTED Rowell Dakins Tel: [phone] Fax: [phone]

## 2020-08-26 NOTE — PROGRESS NOTES
ORTHO POST OP SPINE PROGRESS NOTE    2020  Admit Date: 2020  Admit Diagnosis: Spinal stenosis of lumbar region with neurogenic claudication [M48.062]  Procedure: Procedure(s):  ROBOTIC ASSISTED L4-5 POSTERIOR AND LATERAL DECOMPRESSION FUSION, SINGLE POSITION, WITH BONE MARROW ASPIRATION FROM ILIAC CREST  Post Op day: 1 Day Post-Op    Subjective:     Anabell Gimenez is a patient who has complaints Of pain in the low back. He states his legs feel weak but denies radicular symptoms in the leg status post L4-5 lateral and posterior decompression and fusion done in a single position with left-sided lateral approach and unilateral posterior fixation from the left side. He is tolerating p.o. and able to void. He states that he does not feel safe to go home as he lives alone. Review of Systems: Pertinent items are noted in HPI. Objective:     PT/OT:   Distance Ambulated:           Time Ambulated (min):        Ambulation Response: Activity Response: Fairly tolerated  Assistive Device:              Assistive Device: Fall prevention device    Vital Signs:    Blood pressure 144/81, pulse 100, temperature 98.4 °F (36.9 °C), resp. rate 16, weight 98.6 kg (217 lb 6 oz), SpO2 100 %. Temp (24hrs), Av.8 °F (36.6 °C), Min:96.5 °F (35.8 °C), Max:98.4 °F (36.9 °C)      No intake/output data recorded.    1901 -  0700  In: 9221 [P.O.:990; I.V.:600]  Out: 1850 [Urine:1750]    LAB:    Recent Labs     20  0348   HGB 10.6*       Wound/Drain Assessment:  Drain:      Dressing:     Physical Exam:  Neurological: no deficit  Incision clean, dry, and intact  5/5 strength bilateral lower extremities with the exception of the left hip flexors which are 4-5 and limited by pain    Assessment:      Patient Active Problem List   Diagnosis Code    Major depressive disorder, recurrent episode, severe (HCC) F33.2    Alcohol dependence ([facility] [address] F10.20    Depression F32.9    Elevated hemoglobin A1c R73.09    Spinal stenosis of lumbar region M48.061    MRSA carrier Z22.322    Spinal stenosis of lumbar region with neurogenic claudication M48.062       Plan:     Continue PT/OT/Rehab  Discontinue: IV  Consult: PT  and OT    Discharge To:  Home versus rehab pending progress   expected left leg pain due to surgical approach

## 2020-08-26 NOTE — PROGRESS NOTES
Problem: Mobility Impaired (Adult and Pediatric)  Goal: *Acute Goals and Plan of Care (Insert Text)  Description: FUNCTIONAL STATUS PRIOR TO ADMISSION: Patient was independent and active without use of DME.    HOME SUPPORT PRIOR TO ADMISSION: The patient lived alone with friends/family to provide assistance. Physical Therapy Goals  Initiated 8/25/2020    1. Patient will move from supine to sit and sit to supine  in bed with independence within 4 days. 2. Patient will perform sit to stand with independence within 4 days. 3. Patient will ambulate with independence for 300 feet with the least restrictive device within 4 days. 4. Patient will ascend/descend 5 stairs with bilateral handrail(s) with independence within 4 days. 5. Patient will verbalize and demonstrate understanding of spinal precautions (No bending, lifting greater than 5 lbs, or twisting; log-roll technique; frequent repositioning as instructed) within 4 days. 8/26/2020 1522 by Bogdan Garza, PTA  Outcome: Resolved/Met  8/26/2020 1222 by Bogdan Garza, PTA  Outcome: Progressing Towards Goal   PHYSICAL THERAPY TREATMENT  Patient: Anita Schaffer (78 y.o. male)  Date: 8/26/2020  Diagnosis: Spinal stenosis of lumbar region with neurogenic claudication [M48.062]   <principal problem not specified>  Procedure(s) (LRB):  ROBOTIC ASSISTED L4-5 POSTERIOR AND LATERAL DECOMPRESSION FUSION, SINGLE POSITION, WITH BONE MARROW ASPIRATION FROM ILIAC CREST (N/A) 1 Day Post-Op  Precautions:   No bending, no lifting greater than 5 lbs, no twisting, log-roll technique, repositioning every 20-30 min except when sleeping, brace when OOB (if ordered)  Chart, physical therapy assessment, plan of care and goals were reviewed. ASSESSMENT  Patient continues with skilled PT services and is progressing towards goals. Pt presents with decreased strength and continuing to have pain with mobility. Although with pain pt moving well. Pt able to stand at [facility]/SBA. Pt ambulated  500ft with RW at [facility]/A. Pt moving well with no safety concerns. From physical therapy stand point pt cleared for discharge. Current Level of Function Impacting Discharge (mobility/balance): mobility at [facility]/SBA     Other factors to consider for discharge: pain, lives alone          PLAN :  Patient continues to benefit from skilled intervention to address the above impairments. Continue treatment per established plan of care. to address goals. Recommendation for discharge: (in order for the patient to meet his/her long term goals)  Physical therapy at least 2 days/week in the home     This discharge recommendation:  Has been made in collaboration with the attending provider and/or case management    IF patient discharges home will need the following DME: rolling walker       SUBJECTIVE:   Patient stated  I just got back in bed.     OBJECTIVE DATA SUMMARY:   Critical Behavior:  Neurologic State: Alert, Appropriate for age, Eyes open spontaneously  Orientation Level: Oriented X4  Cognition: Appropriate decision making, Follows commands       Spinal diagnosis intervention:  The patient stated 3/3 back precautions when prompted. Reviewed all 3 back precautions, log roll technique, and sitting for 30 minutes at a time. Functional Mobility Training:    Bed Mobility:  Log Rolling: Stand-by assistance; Additional time  Supine to Sit: Stand-by assistance; Additional time  Sit to Supine: Minimum assistance;Contact guard assistance; Additional time  Scooting: Stand-by assistance        Transfers:  Sit to Stand: Contact guard assistance;Stand-by assistance  Stand to Sit: Contact guard assistance                             Balance:  Sitting: Intact; Without support  Standing: Intact; With support  Standing - Static: Good;Constant support  Standing - Dynamic : Good;Constant support  Ambulation/Gait Training:  Distance (ft): 500 Feet (ft)  Assistive Device: Gait belt;Walker, rolling  Ambulation - Level of Assistance: Contact guard assistance;Stand-by assistance        Gait Abnormalities: Decreased step clearance        Base of Support: Widened     Speed/Dee: Pace decreased (<100 feet/min)  Step Length: Right shortened;Left shortened                    Stairs:  Number of Stairs Trained: 12  Stairs - Level of Assistance: Contact guard assistance;Stand-by assistance   Rail Use: Both    Pain Rating:  Pt reported increased pain with all mobility. Activity Tolerance:   Good  Please refer to the flowsheet for vital signs taken during this treatment. After treatment patient left in no apparent distress:   Supine in bed and Call bell within reach    COMMUNICATION/COLLABORATION:   The patients plan of care was discussed with: Registered nurse.      Koffi Soliz PTA   Time Calculation: 32 mins

## 2020-08-26 NOTE — PROGRESS NOTES
Problem: Self Care Deficits Care Plan (Adult)  Goal: *Acute Goals and Plan of Care (Insert Text)  Description:   FUNCTIONAL STATUS PRIOR TO ADMISSION: pt reports independence in adls. He lives alone. Pt reports that he is familiar with adaptive aids for adls. HOME SUPPORT: pt lives alone, he does not cite his support system. Occupational Therapy Goals  Initiated 8/26/2020  1. Patient will perform grooming in standing, including set up,  with supervision within 7 day(s). 2.  Patient will perform sponge bathing, using adaptive aids, prn, with supervision within 7 day(s). 3.  Patient will perform upper body dressing and lower body dressing with modified independence within 7 day(s). 4.  Patient will perform toilet transfers with modified independence within 7 day(s). 5.  Patient will perform all aspects of toileting with modified independence within 7 day(s). 6.  Patient will participate in upper extremity therapeutic exercise/activities with supervision/set-up for 10 minutes within 7 day(s). Outcome: Not Met   OCCUPATIONAL THERAPY EVALUATION  Patient: Juan Otto (28 y.o. male)  Date: 8/26/2020  Primary Diagnosis: Spinal stenosis of lumbar region with neurogenic claudication [M48.062]  Procedure(s) (LRB):  ROBOTIC ASSISTED L4-5 POSTERIOR AND LATERAL DECOMPRESSION FUSION, SINGLE POSITION, WITH BONE MARROW ASPIRATION FROM ILIAC CREST (N/A) 1 Day Post-Op   Precautions: Back/BLT, fall       ASSESSMENT  Based on the objective data described below, the patient presents with recent back surgery and expected pain, mildly decreased balance and generalized weakness on POD 1, back precautions, multiple medical conditions/history, and decreased endurance impairing adls and mobility. Pt is functioning below his reported independent baseline. Pt lives alone and repeated multiple times that he does not think he can go home and manage alone; he is requesting rehab.   Pt was able to perform self care tasks, verbalize and demonstrate back precautions, was educated in fall prevention and home safety as well as the use of adaptive aids for adls to facilitate adhering to back precautions. Pt is functioning below his baseline and will benefit from acute OT. Home Health OT vs. Rehab is recommended. Current Level of Function Impacting Discharge (ADLs/self-care): supervision to     Functional Outcome Measure: The patient scored 75/100 on the Barthel Index outcome measure which is indicative of 25% decrease from independent baseline. Other factors to consider for discharge: pt lives alone     Patient will benefit from skilled therapy intervention to address the above noted impairments. PLAN :  Recommendations and Planned Interventions: self care training, functional mobility training, therapeutic exercise, balance training, therapeutic activities, endurance activities, patient education, and home safety training    Frequency/Duration: Patient will be followed by occupational therapy 5 times a week to address goals. Recommendation for discharge: (in order for the patient to meet his/her long term goals)  To be determined: HH OT vs. Rehab (pt's request)    This discharge recommendation:  Has not yet been discussed the attending provider and/or case management    IF patient discharges home will need the following DME: may benefit from toilet seat riser with frame or BSC to place over toilet       SUBJECTIVE:   Patient repeating his medical history and report of his snf stay throughout tx session    OBJECTIVE DATA SUMMARY:   HISTORY:   Past Medical History:   Diagnosis Date    Arthritis     Chronic kidney disease     Chronic obstructive pulmonary disease ([facility] [address]     O2 2L sitting - 3L walking -- 24hr/day    Chronic pain     BACK AND LEGS    Elevated hemoglobin A1c 9/25/2019    H/O cardiovascular stress test 06/03/2019    6/3/19 \"Negative for ischemia. Preserved LV function. EF 65%.  No wall motion abnormalities. \" per Dr Aram Yoon notes 6/3/19. H/O ETOH abuse     Heart failure ([facility] [address]     Hypertension     Ill-defined condition     ANXIETY    Ill-defined condition 2009    LUNG COLLAPSED    Ill-defined condition     USES O2 3L AT NIGHT    MRSA carrier 9/26/2019    Opioid dependence ([facility] [address]     per PCP clearance 2020    Psychiatric disorder     DEPRESSION    Seizures ([facility] [address] 2014    Sleep apnea 2020    needs CPAP - does not have it yet. Stroke [facility]) 2015    \"mini-stroke'     Past Surgical History:   Procedure Laterality Date    HX HERNIA REPAIR Right 2015    HX HERNIA REPAIR Left 2015    HX OTHER SURGICAL  2009    CHEST TUBE WITH COLLAPSED LUNGS    HX ROTATOR CUFF REPAIR Right 2016    HX ROTATOR CUFF REPAIR Left 2016       Expanded or extensive additional review of patient history:     Home Situation  Home Environment: Private residence  # Steps to Enter: 5  Rails to Enter: Yes  Hand Rails : Bilateral  Wheelchair Ramp: No  One/Two Story Residence: One story  Living Alone: Yes  Support Systems: Family member(s), Friends \ neighbors  Patient Expects to be Discharged to[de-identified] Private residence  Current DME Used/Available at Home: None  Tub or Shower Type: Tub/Shower combination    Hand dominance: Right    EXAMINATION OF PERFORMANCE DEFICITS:  Cognitive/Behavioral Status:  Neurologic State: Alert  Orientation Level: Appropriate for age  Cognition: Follows commands  Perception: Appears intact  Perseveration: No perseveration noted(many times verbalizes his medical history throughout tx sess)  Safety/Judgement: Awareness of environment; Fall prevention;Home safety    Skin: generally intact--incisions not viewed    Edema: none observed    Hearing:     No complaints  Vision/Perceptual:            No glasses present-no complaints of vision issues                         Range of Motion:  BUEs:  pt reported back pain when raising BUEs above head.   AROM: Generally decreased, functional(has back precautions)  PROM: Generally decreased, functional(backprecautions)                      Strength:  Mild generalized weakness on POD1  Strength: Generally decreased, functional                Coordination:     Fine Motor Skills-Upper: Left Intact; Right Intact    Gross Motor Skills-Upper: Left Intact; Right Intact(reports B shulder issues at baseline)    Tone & Sensation: Tone is normal  Sensation: reports intact                            Balance:  Sitting: Intact; Without support  Standing: Intact; With support  Standing - Static: Good;Constant support  Standing - Dynamic : Good;Constant support    Functional Mobility and Transfers for ADLs:  Bed Mobility:  Rolling: Stand-by assistance; Additional time  Supine to Sit: Stand-by assistance; Additional time  Scooting: Stand-by assistance    Transfers:  Sit to Stand: Contact guard assistance;Stand-by assistance  Stand to Sit: Contact guard assistance  Bed to Chair: Contact guard assistance  Bathroom Mobility: Contact guard assistance  Toilet Transfer : Contact guard assistance  Assistive Device : Walker(cues for safety)    ADL Assessment:  Feeding: Independent    Oral Facial Hygiene/Grooming: Setup(seated)    Bathing: Supervision;Stand-by assistance(issued kit of adaptive aids for adls)    Upper Body Dressing: Setup    Lower Body Dressing: Modified independent;Supervision(issued kit of adaptive aids and educ. in using this session.)    Toileting: Stand by assistance(pt educ. in BLT and was informed of toilet tongs, pt decline)                ADL Intervention and task modifications:  Patient instructed and demonstrated 3/3 back precautions with minimal cues. Cognitive Retraining  Safety/Judgement: Awareness of environment; Fall prevention;Home safety    Patient instructed and indicated understanding the benefits of maintaining activity tolerance, functional mobility, and independence with self care tasks during acute stay  to ensure safe return home and to baseline. Encouraged patient to increase frequency and duration OOB, not sitting longer than 30 mins without marching/walking with staff, be out of bed for all meals, perform daily ADLs (as approved by RN/MD regarding bathing etc), and performing functional mobility to/from bathroom. Patient instruction and indicated understanding on body mechanics, ergonomics and gravitational force on the spine during different body positions to plan activities in prep for return home to complete basic ADLs, instrumental ADLs       Dressing brace: Patient instructed and demonstrated to don/doff velcro on brace using dominant side, keeping non-dominant side intact. Patient instructed and demonstrated in meantime of being able to stand with back against wall to don/doff brace, to don/doff seated using lap and bed/chair surface to support brace while manipulating. Dressing lower body: Patient instructed to don brace first and on the benefits to remain seated to don all clothing to increase independence with precautions and pain management. Patient instructed and demonstrated tailor sitting for lower body dressing   ut is unable to and was issued a kit of adaptive aids to increase independence and adhering to precautions. .   Toileting: Patient instructed on the benefits of using flushable wet wipes and toilet tongs if decreased reach or pain for christina care. Also, the benefits of a reacher to aid in clothing management. Home safety: Patient instructed and indicated understanding on home modifications and safety [raise height of ADL objects (i.e. clothing, sink items, fridge items, items to mouth when grooming), appropriate height of chair surfaces, recliner safety, change of floor surfaces, clear pathways] to increase independence and fall prevention.     Functional Measure:  Barthel Index:    Bathin  Bladder: 10  Bowels: 10  Groomin  Dressin  Feeding: 10  Mobility: 10  Stairs: 5  Toilet Use: 10  Transfer (Bed to Chair and Back): 10  Total: 75/100        The Barthel ADL Index: Guidelines  1. The index should be used as a record of what a patient does, not as a record of what a patient could do. 2. The main aim is to establish degree of independence from any help, physical or verbal, however minor and for whatever reason. 3. The need for supervision renders the patient not independent. 4. A patient's performance should be established using the best available evidence. Asking the patient, friends/relatives and nurses are the usual sources, but direct observation and common sense are also important. However direct testing is not needed. 5. Usually the patient's performance over the preceding 24-48 hours is important, but occasionally longer periods will be relevant. 6. Middle categories imply that the patient supplies over 50 per cent of the effort. 7. Use of aids to be independent is allowed. Jose Manuel Cabrera., Barthel, D.W. ([id number]). Functional evaluation: the Barthel Index. [address] [facility] (14)2. ALEXANDRA Avina, Niki Mariee., Judi Davis., [place], [address] [place] [address] (1999). Measuring the change indisability after inpatient rehabilitation; comparison of the responsiveness of the Barthel Index and Functional [place] Measure. Journal of Neurology, Neurosurgery, and Psychiatry, 66(4), 426-063. Arnulfo Moreno, N.[place].JERSON, ANDRY Duke, & Pawan Barrera M.A. (2004.) Assessment of post-stroke quality of life in cost-effectiveness studies: The usefulness of the Barthel Index and the EuroQoL-5D.  Quality of Life Research, 15, 282-94        Occupational Therapy Evaluation Charge Determination   History Examination Decision-Making   MEDIUM Complexity : Expanded review of history including physical, cognitive and psychosocial  history  MEDIUM Complexity : 3-5 performance deficits relating to physical, cognitive , or psychosocial skils that result in activity limitations and / or participation restrictions MEDIUM Complexity : Patient may present with comorbidities that affect occupational performnce. Miniml to moderate modification of tasks or assistance (eg, physical or verbal ) with assesment(s) is necessary to enable patient to complete evaluation       Based on the above components, the patient evaluation is determined to be of the following complexity level: MEDIUM  Pain Rating:  Stated initially his pain was 10/10  nurse aware. Pt had minimal complaints thereafter    Activity Tolerance:   Fair frequent rests    After treatment patient left in no apparent distress:    Sitting in chair  with NP present    COMMUNICATION/EDUCATION:   The patients plan of care was discussed with: Registered nurse and NP, PTA . Home safety education was provided and the patient/caregiver indicated understanding. and Patient/family have participated as able in goal setting and plan of care. This patients plan of care is appropriate for delegation to hospitals.     Thank you for this referral.  Eros Rodriguez, OTR/L    46 minutes

## 2020-08-26 NOTE — PROGRESS NOTES
ADULT PROTOCOL: JET AEROSOL ASSESSMENT    Patient  Joel Muniz     62 y.o.   male     8/26/2020  5:42 AM    Breath Sounds Pre Procedure: Right Breath Sounds: Clear, Diminished                               Left Breath Sounds: Clear, Diminished    Breath Sounds Post Procedure: Right Breath Sounds: Clear, Diminished                                 Left Breath Sounds: Clear, Diminished    Breathing pattern: Pre procedure Breathing Pattern: Regular          Post procedure Breathing Pattern: Regular    Heart Rate: Pre procedure Pulse: 94           Post procedure Pulse: 98    Resp Rate: Pre procedure Respirations: 18           Post procedure Respirations: 16           Cough: Pre procedure Cough: (no cough)               Post procedure Cough: Non-productive             Oxygen: O2 Device: Nasal cannula   Flow rate (L/min) 2     Changed: NO    SpO2: Pre procedure SpO2: 100 %   with oxygen              Post procedure SpO2: 100 %  with oxygen    Nebulizer Therapy: Current medications Aerosolized Medications: Ipratropium bromide      Changed: NO    Problem List:   Patient Active Problem List   Diagnosis Code    Major depressive disorder, recurrent episode, severe (HCC) F33.2    [facility] F10.20    Depression F32.9    Elevated hemoglobin A1c R73.09    Spinal stenosis of lumbar region M48.061    MRSA carrier Z22.322    Spinal stenosis of lumbar region with neurogenic claudication M48.062       Respiratory Therapist: Mo Díaz, RT

## 2020-08-26 NOTE — PROGRESS NOTES
Problem: Mobility Impaired (Adult and Pediatric)  Goal: *Acute Goals and Plan of Care (Insert Text)  Description: FUNCTIONAL STATUS PRIOR TO ADMISSION: Patient was independent and active without use of DME.    HOME SUPPORT PRIOR TO ADMISSION: The patient lived alone with friends/family to provide assistance. Physical Therapy Goals  Initiated 8/25/2020    1. Patient will move from supine to sit and sit to supine  in bed with independence within 4 days. 2. Patient will perform sit to stand with independence within 4 days. 3. Patient will ambulate with independence for 300 feet with the least restrictive device within 4 days. 4. Patient will ascend/descend 5 stairs with bilateral handrail(s) with independence within 4 days. 5. Patient will verbalize and demonstrate understanding of spinal precautions (No bending, lifting greater than 5 lbs, or twisting; log-roll technique; frequent repositioning as instructed) within 4 days. Outcome: Progressing Towards Goal   PHYSICAL THERAPY TREATMENT  Patient: Jovi Rodriguez (64 y.o. male)  Date: 8/26/2020  Diagnosis: Spinal stenosis of lumbar region with neurogenic claudication [M48.062]   <principal problem not specified>  Procedure(s) (LRB):  ROBOTIC ASSISTED L4-5 POSTERIOR AND LATERAL DECOMPRESSION FUSION, SINGLE POSITION, WITH BONE MARROW ASPIRATION FROM ILIAC CREST (N/A) 1 Day Post-Op  Precautions:   No bending, no lifting greater than 5 lbs, no twisting, log-roll technique, repositioning every 20-30 min except when sleeping, brace when OOB (if ordered)  Chart, physical therapy assessment, plan of care and goals were reviewed. ASSESSMENT  Patient continues with skilled PT services and is progressing towards goals. Pt presents with decreased strength and increased pain. Pt performed bed mobility at A with additional time. Pt performed sit to stand transfer at min A with cueing for hand placement. Pt ambulated 200ft and 350ft with RW at [address].  Pt ascended and descended 12 steps with both ralings at [facility]/SBA. Pt performed a car transfer at [facility] with cueing for sequencing. Pt moving well but reported pain is decreasing confidence. Pt reported he would be retuning home alone. Current Level of Function Impacting Discharge (mobility/balance): mobility at [facility]/SBA    Other factors to consider for discharge: lives alone, pain          PLAN :  Patient continues to benefit from skilled intervention to address the above impairments. Continue treatment per established plan of care. to address goals. Recommendation for discharge: (in order for the patient to meet his/her long term goals)  Physical therapy at least 2 days/week in the home     This discharge recommendation:  Has been made in collaboration with the attending provider and/or case management    IF patient discharges home will need the following DME: rolling walker       SUBJECTIVE:   Patient stated I can't go home today.     OBJECTIVE DATA SUMMARY:   Critical Behavior:  Neurologic State: Alert, Appropriate for age, Eyes open spontaneously  Orientation Level: Oriented X4  Cognition: Appropriate decision making, Follows commands       Spinal diagnosis intervention:  The patient stated 3/3 back precautions when prompted. Reviewed all 3 back precautions, log roll technique, and sitting for 30 minutes at a time. Functional Mobility Training:    Bed Mobility:  Log Rolling: Stand-by assistance  Supine to Sit: Stand-by assistance     Scooting: Stand-by assistance        Transfers:  Sit to Stand: Minimum assistance;Assist x1  Stand to Sit: Contact guard assistance                             Balance:  Sitting: Intact; With support  Standing: Intact; With support  Standing - Static: Good;Constant support  Standing - Dynamic : Fair;Good;Constant support  Ambulation/Gait Training:  Distance (ft): 550 Feet (ft)(200ft and 350ft )  Assistive Device: Gait belt;Walker, rolling  Ambulation - Level of Assistance: Contact guard assistance        Gait Abnormalities: Decreased step clearance        Base of Support: Widened     Speed/Dee: Pace decreased (<100 feet/min)  Step Length: Right shortened;Left shortened                   Stairs:  Number of Stairs Trained: 12  Stairs - Level of Assistance: Contact guard assistance;Stand-by assistance   Rail Use: Both  Pain Rating:  Pt reported pain with all mobility. Activity Tolerance:   Good, desaturates with exertion and requires oxygen and requires rest breaks  Please refer to the flowsheet for vital signs taken during this treatment. After treatment patient left in no apparent distress:   Sitting in chair and Call bell within reach    COMMUNICATION/COLLABORATION:   The patients plan of care was discussed with: Registered nurse.      Rosanne Washington PTA   Time Calculation: 59 mins

## 2020-08-26 NOTE — PROGRESS NOTES
Ortho / Neurosurgery NP Note    POD# 1  s/p ROBOTIC ASSISTED L4-5 POSTERIOR AND LATERAL DECOMPRESSION FUSION, SINGLE POSITION, WITH BONE MARROW ASPIRATION FROM ILIAC CREST     Pt sitting in chair. Recently completed OT session. PTA, patient experienced low back pain with radiating pain down LLE, and sometimes RLE worsened by activity  Post-operatively, patient denies pain down BLE, reports expected incisional back pain - relieved by prn oxycodone  Tolerating regular diet, denies nausea. VSS Afebrile. 2L NC.  PTA: wears 2L during day and 3L O2 at night   BP rechecked 137/94    Visit Vitals  /81   Pulse 100   Temp 98.4 °F (36.9 °C)   Resp 16   Wt 98.6 kg (217 lb 6 oz)   SpO2 100%   BMI 30.32 kg/m²       Voiding status: voiding without difficulty  Output (mL)  Urine Voided: 650 ml (08/26/20 0500)  Last Bowel Movement Date: 08/24/20 (08/26/20 0800)  Unmeasurable Output  Urine Occurrence(s): 1 (08/25/20 2149)      Labs    Lab Results   Component Value Date/Time    HGB 10.6 (L) 08/26/2020 03:48 AM      Lab Results   Component Value Date/Time    INR 1.0 08/10/2020 04:54 PM      Lab Results   Component Value Date/Time    Sodium 140 08/10/2020 04:54 PM    Potassium 3.8 08/10/2020 04:54 PM    Chloride 106 08/10/2020 04:54 PM    CO2 29 08/10/2020 04:54 PM    Glucose 99 08/10/2020 04:54 PM    BUN 12 08/10/2020 04:54 PM    Creatinine 1.53 (H) 08/10/2020 04:54 PM    Calcium 9.6 08/10/2020 04:54 PM     Recent Glucose Results:   No results found for: GLU, GLUPOC, GLUCPOC        Body mass index is 30.32 kg/m². : A BMI > 30 is classified as obesity and > 40 is classified as morbid obesity. Dressing c.d.i - Lumbar brace in place   Calves soft and supple; No pain with passive stretch  Sensation and motor intact - PF/DF/EHL intact 4/5  SCDs for mechanical DVT proph while in bed     PLAN:  1) Neurovascular assessment q4 hours   2) PT/OT - Pitman brace when oob. Patient has cleared PT and OT. Recommendations for RW and HH. 3) Pain control - scheduled tylenol, prn oxycodone   4) COPD - 2L  O2 during day and 3L at night. Symbicort and prn albuterol. 5) HTN - likely worsened by pain and recent activity. Resume diltiazem this evening . 6) Readniess for discharge:     [x] Vital Signs stable    [x] Hgb stable    [x] + Voiding    [x] Wound intact, drainage minimal    [x] Tolerating PO intake     [] Cleared by PT (OT if applicable)     [] Stair training completed (if applicable)    [] Independent / Contact Guard Assist (household distance)     [] Bed mobility     [] Car transfers     [] ADLs    [x] Adequate pain control on oral medication alone     Patient continues to expressconcerns about going home, states he lives alone and has no friends/family to assist him. Independent with ADLs/IADLs prior to surgery, reports difficulty bathing 2/2 lisette rotator cuff issues. He is requesting \"rehab\". When discussed rehab options patient declined. Clarified patient's meaning behind \"rehab\". Pt would like someone to check on his progress and assist with bathing. Patient okay with discharging home with [facility]. Will notify CM about HH and RW. Discussed transportation home - pt plans on using same transportation service that brought him to hospital.   Will send Rx to [address] [facility] and have delivered prior to discharge.      Martha Velasquez NP

## 2020-08-27 VITALS
OXYGEN SATURATION: 98 % | SYSTOLIC BLOOD PRESSURE: 133 MMHG | DIASTOLIC BLOOD PRESSURE: 88 MMHG | TEMPERATURE: 98.3 F | HEART RATE: 106 BPM | BODY MASS INDEX: 30.32 KG/M2 | WEIGHT: 217.37 LBS | RESPIRATION RATE: 16 BRPM

## 2020-08-27 LAB — HGB BLD-MCNC: 10.4 G/DL (ref 12.1–17)

## 2020-08-27 PROCEDURE — 74011000250 HC RX REV CODE- 250: Performed by: ORTHOPAEDIC SURGERY

## 2020-08-27 PROCEDURE — 74011250637 HC RX REV CODE- 250/637: Performed by: ORTHOPAEDIC SURGERY

## 2020-08-27 PROCEDURE — 77010033678 HC OXYGEN DAILY

## 2020-08-27 PROCEDURE — 36415 COLL VENOUS BLD VENIPUNCTURE: CPT

## 2020-08-27 PROCEDURE — 94760 N-INVAS EAR/PLS OXIMETRY 1: CPT

## 2020-08-27 PROCEDURE — 97535 SELF CARE MNGMENT TRAINING: CPT

## 2020-08-27 PROCEDURE — 97530 THERAPEUTIC ACTIVITIES: CPT

## 2020-08-27 PROCEDURE — 94640 AIRWAY INHALATION TREATMENT: CPT

## 2020-08-27 PROCEDURE — 85018 HEMOGLOBIN: CPT

## 2020-08-27 PROCEDURE — 97116 GAIT TRAINING THERAPY: CPT

## 2020-08-27 RX ADMIN — OXYCODONE HYDROCHLORIDE 15 MG: 5 TABLET ORAL at 12:43

## 2020-08-27 RX ADMIN — ARFORMOTEROL TARTRATE: 15 SOLUTION RESPIRATORY (INHALATION) at 08:35

## 2020-08-27 RX ADMIN — Medication 1 AMPULE: at 09:17

## 2020-08-27 RX ADMIN — OXYCODONE HYDROCHLORIDE 15 MG: 5 TABLET ORAL at 02:45

## 2020-08-27 RX ADMIN — PYRIDOXINE HCL TAB 50 MG 50 MG: 50 TAB at 09:10

## 2020-08-27 RX ADMIN — OXYCODONE HYDROCHLORIDE 15 MG: 5 TABLET ORAL at 09:08

## 2020-08-27 RX ADMIN — POLYETHYLENE GLYCOL 3350 17 G: 17 POWDER, FOR SOLUTION ORAL at 09:08

## 2020-08-27 RX ADMIN — FERROUS SULFATE TAB 325 MG (65 MG ELEMENTAL FE) 325 MG: 325 (65 FE) TAB at 06:52

## 2020-08-27 RX ADMIN — MONTELUKAST 10 MG: 10 TABLET, FILM COATED ORAL at 09:09

## 2020-08-27 RX ADMIN — ACETAMINOPHEN 1000 MG: 325 TABLET ORAL at 05:49

## 2020-08-27 RX ADMIN — PANTOPRAZOLE SODIUM 40 MG: 40 TABLET, DELAYED RELEASE ORAL at 06:52

## 2020-08-27 RX ADMIN — SENNOSIDES 17.2 MG: 8.6 TABLET, FILM COATED ORAL at 09:09

## 2020-08-27 RX ADMIN — BUSPIRONE HYDROCHLORIDE 15 MG: 5 TABLET ORAL at 09:10

## 2020-08-27 RX ADMIN — GABAPENTIN 600 MG: 300 CAPSULE ORAL at 09:09

## 2020-08-27 RX ADMIN — LEVETIRACETAM 1000 MG: 500 TABLET ORAL at 09:10

## 2020-08-27 RX ADMIN — IPRATROPIUM BROMIDE 0.5 MG: 0.5 SOLUTION RESPIRATORY (INHALATION) at 00:24

## 2020-08-27 RX ADMIN — Medication 10 ML: at 14:27

## 2020-08-27 RX ADMIN — DICLOFENAC 2 G: 10 GEL TOPICAL at 14:26

## 2020-08-27 RX ADMIN — CYCLOBENZAPRINE 10 MG: 10 TABLET, FILM COATED ORAL at 13:51

## 2020-08-27 RX ADMIN — FUROSEMIDE 40 MG: 40 TABLET ORAL at 09:09

## 2020-08-27 RX ADMIN — IPRATROPIUM BROMIDE 0.5 MG: 0.5 SOLUTION RESPIRATORY (INHALATION) at 08:35

## 2020-08-27 RX ADMIN — Medication 10 ML: at 05:54

## 2020-08-27 RX ADMIN — DULOXETINE HYDROCHLORIDE 120 MG: 30 CAPSULE, DELAYED RELEASE ORAL at 09:10

## 2020-08-27 RX ADMIN — OXYCODONE HYDROCHLORIDE 15 MG: 5 TABLET ORAL at 15:17

## 2020-08-27 RX ADMIN — DICLOFENAC 2 G: 10 GEL TOPICAL at 09:23

## 2020-08-27 RX ADMIN — OXYCODONE HYDROCHLORIDE 15 MG: 5 TABLET ORAL at 05:50

## 2020-08-27 RX ADMIN — NALTREXONE HYDROCHLORIDE 50 MG: 50 TABLET, FILM COATED ORAL at 09:18

## 2020-08-27 NOTE — PROGRESS NOTES
Problem: Self Care Deficits Care Plan (Adult)  Goal: *Acute Goals and Plan of Care (Insert Text)  Description:   FUNCTIONAL STATUS PRIOR TO ADMISSION: pt reports independence in adls. He lives alone. Pt reports that he is familiar with adaptive aids for adls. HOME SUPPORT: pt lives alone, he does not cite his support system. Occupational Therapy Goals  Initiated 8/26/2020  1. Patient will perform grooming in standing, including set up,  with supervision within 7 day(s). 2.  Patient will perform sponge bathing, using adaptive aids, prn, with supervision within 7 day(s). 3.  Patient will perform upper body dressing and lower body dressing with modified independence within 7 day(s). 4.  Patient will perform toilet transfers with modified independence within 7 day(s). 5.  Patient will perform all aspects of toileting with modified independence within 7 day(s). 6.  Patient will participate in upper extremity therapeutic exercise/activities with supervision/set-up for 10 minutes within 7 day(s). Outcome: Progressing Towards Goal   OCCUPATIONAL THERAPY TREATMENT  Patient: Anita Pritchard (61 y.o. male)  Date: 8/27/2020  Diagnosis: Spinal stenosis of lumbar region with neurogenic claudication [M48.062]   <principal problem not specified>  Procedure(s) (LRB):  ROBOTIC ASSISTED L4-5 POSTERIOR AND LATERAL DECOMPRESSION FUSION, SINGLE POSITION, WITH BONE MARROW ASPIRATION FROM ILIAC CREST (N/A) 2 Days Post-Op  Precautions:    Chart, occupational therapy assessment, plan of care, and goals were reviewed. ASSESSMENT  Patient continues with skilled OT services and is progressing towards goals. Pt was issued toilet tongs and stated that he has issues with both his shoulders and has difficulty with reaching buttocks even before surgery. Pt was able to prisca his socks , pants and shoes with tailor sitting on EOb and was able to prisca his back brace.   Pt was able to demonstrate log rolling in bed and stated that he will be returning home alone with home care OT and PT and HHA. Recommend that pt have family or friends to check on him but he stated he would be fine. Pt was left sitting on EOB with call bell    Current Level of Function Impacting Discharge (ADLs): decreased endurance, strength, back pain, and ADLs, and functional mobility     Other factors to consider for discharge: pt to return home with home care OT and PT          PLAN :  Patient continues to benefit from skilled intervention to address the above impairments. Continue treatment per established plan of care. to address goals. Recommend with staff: Bisi Sutherland for meals     Recommend next OT session: work on standing at the sink for ADLs     Recommendation for discharge: (in order for the patient to meet his/her long term goals)  Occupational therapy at least 2 days/week in the home AND ensure assist and/or supervision for safety with ADLs    This discharge recommendation:  Has been made in collaboration with the attending provider and/or case management    IF patient discharges home will need the following DME: tbd       SUBJECTIVE:   Patient stated Radha Suarez will be fine at home. Davina Ruff    OBJECTIVE DATA SUMMARY:   Cognitive/Behavioral Status:  Neurologic State: Alert; Appropriate for age  Orientation Level: Oriented X4  Cognition: Appropriate for age attention/concentration; Follows commands             Functional Mobility and Transfers for ADLs:  Bed Mobility:  Rolling: Stand-by assistance  Supine to Sit: Stand-by assistance; Additional time  Scooting: Stand-by assistance    Transfers:  Sit to Stand: Contact guard assistance;Stand-by assistance          Balance:  Sitting: Intact; With support  Standing: Intact; With support  Standing - Static: Good;Constant support  Standing - Dynamic : Good;Constant support    ADL Intervention:        Pt is set up for UB ADLs and SBA for LB ADLs per pt report          Activity Tolerance:   Fair  Please refer to the flowsheet for vital signs taken during this treatment. After treatment patient left in no apparent distress:   Supine in bed and Call bell within reach    COMMUNICATION/COLLABORATION:   The patients plan of care was discussed with: Physical therapist and Registered nurse.      Kathie Martinez OT  Time Calculation: 22 mins

## 2020-08-27 NOTE — PROGRESS NOTES
Ortho / Neurosurgery NP Note    POD# 2  s/p ROBOTIC ASSISTED L4-5 POSTERIOR AND LATERAL DECOMPRESSION FUSION, SINGLE POSITION, WITH BONE MARROW ASPIRATION FROM ILIAC CREST   Patient seen by DAPHNE Schultz earlier today. Pt resting in bed   PTA, patient experienced low back pain with radiating pain down LLE, and sometimes RLE worsened by activity  Post-operatively, patient denies pain down BLE, reports expected incisional back pain - relieved by prn oxycodone  Tolerating regular diet, denies nausea. VSS Afebrile. 2L NC.  PTA: wears 2L during day and 3L O2 at night     Visit Vitals  /88 (BP 1 Location: Right arm, BP Patient Position: At rest)   Pulse (!) 106   Temp 98.3 °F (36.8 °C)   Resp 16   Wt 98.6 kg (217 lb 6 oz)   SpO2 98%   BMI 30.32 kg/m²       Voiding status: voiding without difficulty  Output (mL)  Urine Voided: 500 ml (08/26/20 1600)  Last Bowel Movement Date: 08/26/20 (08/26/20 2325)  Unmeasurable Output  Urine Occurrence(s): 1 (08/27/20 0549)  Stool Occurrence(s): 1 (08/27/20 0549)      Labs    Lab Results   Component Value Date/Time    HGB 10.4 (L) 08/27/2020 02:51 AM      Lab Results   Component Value Date/Time    INR 1.0 08/10/2020 04:54 PM      Lab Results   Component Value Date/Time    Sodium 140 08/10/2020 04:54 PM    Potassium 3.8 08/10/2020 04:54 PM    Chloride 106 08/10/2020 04:54 PM    CO2 29 08/10/2020 04:54 PM    Glucose 99 08/10/2020 04:54 PM    BUN 12 08/10/2020 04:54 PM    Creatinine 1.53 (H) 08/10/2020 04:54 PM    Calcium 9.6 08/10/2020 04:54 PM     Recent Glucose Results:   No results found for: GLU, GLUPOC, GLUCPOC        Body mass index is 30.32 kg/m². : A BMI > 30 is classified as obesity and > 40 is classified as morbid obesity. Dressing c.d.i   Calves soft and supple;  No pain with passive stretch  Sensation and motor intact - PF/DF/EHL intact 4/5  SCDs for mechanical DVT proph while in bed     PLAN:  1) Neurovascular assessment q4 hours   2) PT/OT - [place] brace when oob. Patient has cleared PT and OT. Recommendations for RW and HH. 3) Pain control - scheduled tylenol, prn oxycodone   4) COPD - 2L  O2 during day and 3L at night. Symbicort and prn albuterol. 5) HTN - BP stable. Continue diltiazem. 6) Readniess for discharge:     [x] Vital Signs stable    [x] Hgb stable    [x] + Voiding    [x] Wound intact, drainage minimal    [x] Tolerating PO intake     [x] Cleared by PT (OT if applicable)     [x] Stair training completed (if applicable)    [x] Independent / Contact Guard Assist (household distance)     [x] Bed mobility     [x] Car transfers     [x] ADLs    [x] Adequate pain control on oral medication alone     Discharge home today with [facility] for PT/OT needs. Rx filled by [facility] and will be provided to patient at discharge. Pt plans to use his own transportation service, for which he has called to inform them of discharge. CM working on obtaining RW prior to discharge. Pt is progressing well and cleared PT/OT, lives alone and feels comfortable returning home today. Encouraged him to continuing reaching out to friends/Jain[employer] members for support.      Viviana Casarez NP

## 2020-08-27 NOTE — PROGRESS NOTES
Bedside and Verbal shift change report given to ROSLYN Obrien  (oncoming nurse) by Princess Chan  (offgoing nurse). Report included the following information SBAR, Kardex, Procedure Summary, Intake/Output, MAR, Accordion and Recent Results.

## 2020-08-27 NOTE — PROGRESS NOTES
Problem: Mobility Impaired (Adult and Pediatric)  Goal: *Acute Goals and Plan of Care (Insert Text)  Description: FUNCTIONAL STATUS PRIOR TO ADMISSION: Patient was independent and active without use of DME.    HOME SUPPORT PRIOR TO ADMISSION: The patient lived alone with friends/family to provide assistance. Physical Therapy Goals  Initiated 8/25/2020    1. Patient will move from supine to sit and sit to supine  in bed with independence within 4 days. 2. Patient will perform sit to stand with independence within 4 days. 3. Patient will ambulate with independence for 300 feet with the least restrictive device within 4 days. 4. Patient will ascend/descend 5 stairs with bilateral handrail(s) with independence within 4 days. 5. Patient will verbalize and demonstrate understanding of spinal precautions (No bending, lifting greater than 5 lbs, or twisting; log-roll technique; frequent repositioning as instructed) within 4 days. 8/27/2020 1305 by Yessy Smith PTA  Outcome: Progressing Towards Goal   PHYSICAL THERAPY TREATMENT  Patient: Ricardo Mcclain (88 y.o. male)  Date: 8/27/2020  Diagnosis: Spinal stenosis of lumbar region with neurogenic claudication [M48.062]   <principal problem not specified>  Procedure(s) (LRB):  ROBOTIC ASSISTED L4-5 POSTERIOR AND LATERAL DECOMPRESSION FUSION, SINGLE POSITION, WITH BONE MARROW ASPIRATION FROM ILIAC CREST (N/A) 2 Days Post-Op  Precautions:   No bending, no lifting greater than 5 lbs, no twisting, log-roll technique, repositioning every 20-30 min except when sleeping, brace when OOB (if ordered)  Chart, physical therapy assessment, plan of care and goals were reviewed. ASSESSMENT  Patient continues with skilled PT services and is progressing towards goals. Pt presents with decreased strength and increased pain with mobility. Pt performed bed mobility at SBA/supervision with additional time. Pt performed sit to stand transfer at [facility]/SBA.  Pt ambulated 400ft with RW at [facility]/SBA. Pt ambulated 400ft with RW at [facility]/SBA. Pt requiring cueing to to improve posture. Pt able to correct. Pt moving well although with complaints of pain throughout. From physical therapy stand point pt cleared for discharge. Current Level of Function Impacting Discharge (mobility/balance): mobility at [facility]/SBA     Other factors to consider for discharge: lives alone, pain          PLAN :  Patient continues to benefit from skilled intervention to address the above impairments. Continue treatment per established plan of care. to address goals. Recommendation for discharge: (in order for the patient to meet his/her long term goals)  Physical therapy at least 2 days/week in the home     This discharge recommendation:  Has been made in collaboration with the attending provider and/or case management    IF patient discharges home will need the following DME: rolling walker       SUBJECTIVE:   Patient stated  It feels worse than yesterday.     OBJECTIVE DATA SUMMARY:   Critical Behavior:  Neurologic State: Alert, Appropriate for age  Orientation Level: Oriented X4  Cognition: Appropriate decision making, Follows commands  Safety/Judgement: Awareness of environment, Fall prevention, Home safety    Spinal diagnosis intervention:  The patient stated 3/3 back precautions when prompted. Reviewed all 3 back precautions, log roll technique, and sitting for 30 minutes at a time. Functional Mobility Training:    Bed Mobility:  Log Rolling: (P) Stand-by assistance  Supine to Sit: (P) Stand-by assistance; Additional time     Scooting: (P) Stand-by assistance        Transfers:  Sit to Stand: (P) Contact guard assistance;Stand-by assistance  Stand to Sit: (P) Contact guard assistance                             Balance:  Sitting: (P) Intact; With support  Standing: (P) Intact; With support  Standing - Static: (P) Good;Constant support  Standing - Dynamic : (P) Good;Constant support  Ambulation/Gait Training:  Distance (ft): (P) 400 Feet (ft)  Assistive Device: (P) Gait belt;Walker, rolling  Ambulation - Level of Assistance: (P) Contact guard assistance;Stand-by assistance        Gait Abnormalities: (P) Decreased step clearance(slight trunk flexion )        Base of Support: (P) Widened     Speed/Dee: (P) Pace decreased (<100 feet/min)  Step Length: (P) Right shortened;Left shortened           Pain Rating:  Pt with complaints of pain throughout session     Activity Tolerance:   Good  Please refer to the flowsheet for vital signs taken during this treatment. After treatment patient left in no apparent distress:   Sitting in chair and Call bell within reach    COMMUNICATION/COLLABORATION:   The patients plan of care was discussed with: Registered nurse.      Roque Otero PTA   Time Calculation: 25 mins

## 2020-08-27 NOTE — PROGRESS NOTES
Transition of Care  · Ortho follow-up 2 weeks   · HH: Pending sale to [facility] Accepted   · RW: [facility] approved to be delivered to [address] today. [facility] unable to accept Pt for [place] due to Pt being out of the service area. [facility] for [facility] services. [facility] is able to provide Pt with walker. Yves Hanson is to be delivered to [address] before dc.          Raffi Kelley, [address] [place] [address]

## 2020-08-27 NOTE — DISCHARGE SUMMARY
Spine Discharge Summary    Patient ID:  Gracy Garcia  [id number]  male  62 y.o.  1960    Admit date: 8/25/2020    Discharge date: 8/27/2020    Admitting Physician: Jayro Irwin MD     Consulting Physician(s):   Treatment Team: Attending Provider: Jaclyn Abraham MD; Utilization Review: Shantell Finch RN; Care Manager: Lavonne Mg; Staff Nurse: Temo Brian    Date of Surgery:   8/25/2020     Preoperative Diagnosis:  LOW BACK PAIN, RIGHT SIDED SCIATICA, POSTLAMINECTOMY SYNDROME LUMBAR REGION, FORAMINAL STENOSIS OF LUMBAR REION, SYNOVIAL CYST OF LUMBAR FACET JOINT, SPINAL STENOSIS LUMBAR REGION WITH NEUROGENIC CLAUDICATION    Postoperative Diagnosis:   LOW BACK PAIN, RIGHT SIDED SCIATICA, POSTLAMINECTOMY SYNDROME LUMBAR REGION, FORAMINAL STENOSIS OF LUMBAR REION, SYNOVIAL CYST OF LUMBAR FACET JOINT, SPINAL STENOSIS LUMBAR REGION WITH NEUROGENIC CLAUDICATION    Procedure(s):  ROBOTIC ASSISTED L4-5 POSTERIOR AND LATERAL DECOMPRESSION FUSION, SINGLE POSITION, WITH BONE MARROW ASPIRATION FROM ILIAC CREST     Anesthesia Type:   General     Surgeon: Jaclyn Abraham MD                            HPI:  Pt is a 62 y.o. male who has a history of LOW BACK PAIN, RIGHT SIDED SCIATICA, POSTLAMINECTOMY SYNDROME LUMBAR REGION, FORAMINAL STENOSIS OF LUMBAR REION, SYNOVIAL CYST OF LUMBAR FACET JOINT, SPINAL STENOSIS LUMBAR REGION WITH NEUROGENIC CLAUDICATION  with pain and limitations of activities of daily living who presents at this time for a ROBOTIC ASSISTED L4-5 POSTERIOR AND LATERAL DECOMPRESSION FUSION, SINGLE POSITION, WITH BONE MARROW ASPIRATION FROM ILIAC CREST  following the failure of conservative management.     PMH:   Past Medical History:   Diagnosis Date    Arthritis     Chronic kidney disease     Chronic obstructive pulmonary disease (HCC)     O2 2L sitting - 3L walking -- 24hr/day    Chronic pain     BACK AND LEGS    Elevated hemoglobin A1c 9/25/2019    H/O cardiovascular stress test 06/03/2019 6/3/19 \"Negative for ischemia. Preserved LV function. EF 65%. No wall motion abnormalities. \" per Dr Hanny Mena notes 6/3/19.    H/O ETOH abuse     [facility]     Hypertension     Ill-defined condition     ANXIETY    Ill-defined condition 2009    LUNG COLLAPSED    Ill-defined condition     USES O2 3L AT NIGHT    MRSA carrier 9/26/2019    Opioid dependence ([facility]     per PCP clearance 2020    Psychiatric disorder     DEPRESSION    Seizures ([facility] 2014    Sleep apnea 2020    needs CPAP - does not have it yet.  Stroke [facility]) 2015    \"mini-stroke'       Body mass index is 30.32 kg/m². : A BMI > 30 is classified as obesity and > 40 is classified as morbid obesity. Medications upon admission :   Prior to Admission Medications   Prescriptions Last Dose Informant Patient Reported? Taking? DULoxetine (CYMBALTA) 30 mg capsule 8/23/2020  No No   Sig: Take 3 Caps by mouth daily. Indications: major depressive disorder   Patient taking differently: Take 120 mg by mouth daily. Indications: major depressive disorder   Oxygen   Yes No   Sig: Indications: 3 LITERS NC AT BEDTIME   albuterol (PROVENTIL VENTOLIN) 2.5 mg /3 mL (0.083 %) nebulizer solution 8/22/2020  No Yes   Sig: 3 mL by Nebulization route every four (4) hours as needed for Wheezing or Shortness of Breath. albuterol-ipratropium (DUO-NEB) 2.5 mg-0.5 mg/3 ml nebu 7/25/2020 at Unknown time  Yes Yes   Sig: 3 mL by Nebulization route every six (6) hours as needed. atorvastatin (LIPITOR) 40 mg tablet 8/25/2020 at 0320  Yes Yes   Sig: Take 40 mg by mouth nightly. baclofen (LIORESAL) 10 mg tablet 8/25/2020 at 0320  Yes Yes   Sig: Take 10 mg by mouth three (3) times daily. budesonide-formoterol (SYMBICORT) 80-4.5 mcg/actuation HFAA 8/25/2020 at 320  No Yes   Sig: Take 2 Puffs by inhalation two (2) times a day.  Indications: BRONCHOSPASM PREVENTION WITH COPD   busPIRone (BUSPAR) 15 mg tablet 8/24/2020 at Unknown time  No Yes   Sig: Take 1 Tab by mouth three (3) times daily. Indications: Generalized Anxiety Disorder   diclofenac (VOLTAREN) 1 % gel 8/11/2020  Yes No   Sig: Apply 2 g to affected area four (4) times daily. dilTIAZem (TIAZAC) 360 mg ER capsule 8/24/2020 at Unknown time  Yes Yes   Sig: Take 360 mg by mouth nightly. ferrous sulfate 325 mg (65 mg iron) tablet 8/23/2020 at Unknown time  Yes Yes   Sig: Take  by mouth Daily (before breakfast). furosemide (LASIX) 40 mg tablet 8/25/2020 at 0320  Yes Yes   Sig: Take 40 mg by mouth daily. gabapentin (NEURONTIN) 300 mg capsule 8/22/2020  Yes No   Sig: Take 600 mg by mouth three (3) times daily. levETIRAcetam (KEPPRA) 1,000 mg tablet 8/25/2020 at 0320  No Yes   Sig: Take 1 Tab by mouth two (2) times a day. Indications: PARTIAL EPILEPSY TREATMENT ADJUNCT   montelukast (SINGULAIR) 10 mg tablet 8/24/2020 at Unknown time  Yes Yes   Sig: Take 10 mg by mouth daily. naltrexone (DEPADE) 50 mg tablet 8/24/2020 at Unknown time  No Yes   Sig: Take 1 Tab by mouth daily. Indications: alcoholism   ondansetron hcl (ZOFRAN) 4 mg tablet 8/25/2020 at 0320  Yes Yes   Sig: Take 4 mg by mouth every eight (8) hours as needed for Nausea. pantoprazole (PROTONIX) 40 mg tablet 8/22/2020  No No   Sig: Take 1 Tab by mouth Before breakfast and dinner. pyridoxine, vitamin B6, (VITAMIN B-6) 50 mg tablet 8/21/2020  Yes No   Sig: Take 50 mg by mouth two (2) times a day. senna (Senexon) 8.6 mg tablet 8/24/2020 at Unknown time  Yes Yes   Sig: Take 2 Tabs by mouth two (2) times a day. tiotropium (SPIRIVA) 18 mcg inhalation capsule 8/25/2020 at 0320  No Yes   Sig: Take 1 Cap by inhalation daily. traZODone (DESYREL) 50 mg tablet 8/23/2020  No No   Sig: Take 1 Tab by mouth nightly as needed for Sleep. Patient taking differently: Take 300 mg by mouth two (2) times a day. Facility-Administered Medications: None        Allergies:     Allergies   Allergen Reactions    Chantix [Varenicline] [facility]        Hospital Course: The patient underwent surgery. Complications:  None; patient tolerated the procedure well. Was taken to the PACU in stable condition and then transferred to the ortho floor. Perioperative Antibiotics:  vancomycin     Postoperative Pain Management:  Oxycodone & Tylenol     Postoperative transfusions:    Number of units banked PRBCs =   none     Post Op complications: none    Hemoglobin at discharge:    Lab Results   Component Value Date/Time    HGB 10.4 (L) 08/27/2020 02:51 AM    INR 1.0 08/10/2020 04:54 PM       Dressing remained clean, dry and intact. No significant erythema or swelling. Wound appears to be healing without any evidence of infection. Neurovascular exam found to be within normal limits. Physical Therapy started following surgery and participated in bed mobility, transfers and ambulation. Gait:  Gait  Base of Support: Widened  Speed/Dee: Pace decreased (<100 feet/min)  Step Length: Right shortened, Left shortened  Stance: Right increased, Left increased  Gait Abnormalities: Decreased step clearance(slight trunk flexion )  Ambulation - Level of Assistance: Contact guard assistance, Stand-by assistance  Distance (ft): 400 Feet (ft)  Assistive Device: Gait belt, Walker, rolling  Rail Use: Both  Stairs - Level of Assistance: Contact guard assistance, Stand-by assistance  Number of Stairs Trained: 12                   Discharged to: Home with . Condition on Discharge:   stable    Discharge instructions:  - Take pain medications as prescribed  - Resume pre hospital diet      - Discharge activity: activity as tolerated  - Ambulate as tolerated  - Lumbar brace when oob  - Avoid bending, lifting and twisting  - Wound Care Keep wound clean and dry. See discharge instruction sheet.             -DISCHARGE MEDICATION LIST     Current Discharge Medication List      START taking these medications    Details   acetaminophen (TYLENOL) 500 mg tablet Take 2 Tabs by mouth every six (6) hours for 14 days. Qty: 112 Tab, Refills: 0      polyethylene glycol (MIRALAX) 17 gram packet Take 1 Packet by mouth daily for 14 days. Qty: 14 Packet, Refills: 0      senna-docusate (PERICOLACE) 8.6-50 mg per tablet Take 1 Tab by mouth two (2) times a day for 14 days. Qty: 28 Tab, Refills: 0      oxyCODONE IR (ROXICODONE) 5 mg immediate release tablet Take 1-2 Tabs by mouth every four (4) hours as needed for Pain for up to 14 days. Max Daily Amount: 60 mg.  Qty: 60 Tab, Refills: 0    Associated Diagnoses: Status post lumbar spine operative procedure for decompression of spinal cord         CONTINUE these medications which have NOT CHANGED    Details   senna (Senexon) 8.6 mg tablet Take 2 Tabs by mouth two (2) times a day. ferrous sulfate 325 mg (65 mg iron) tablet Take  by mouth Daily (before breakfast). furosemide (LASIX) 40 mg tablet Take 40 mg by mouth daily. montelukast (SINGULAIR) 10 mg tablet Take 10 mg by mouth daily. dilTIAZem (TIAZAC) 360 mg ER capsule Take 360 mg by mouth nightly. ondansetron hcl (ZOFRAN) 4 mg tablet Take 4 mg by mouth every eight (8) hours as needed for Nausea. albuterol-ipratropium (DUO-NEB) 2.5 mg-0.5 mg/3 ml nebu 3 mL by Nebulization route every six (6) hours as needed. atorvastatin (LIPITOR) 40 mg tablet Take 40 mg by mouth nightly. baclofen (LIORESAL) 10 mg tablet Take 10 mg by mouth three (3) times daily. albuterol (PROVENTIL VENTOLIN) 2.5 mg /3 mL (0.083 %) nebulizer solution 3 mL by Nebulization route every four (4) hours as needed for Wheezing or Shortness of Breath. Qty: 24 Each, Refills: 2      budesonide-formoterol (SYMBICORT) 80-4.5 mcg/actuation HFAA Take 2 Puffs by inhalation two (2) times a day. Indications: BRONCHOSPASM PREVENTION WITH COPD  Qty: 2 Inhaler, Refills: 2      busPIRone (BUSPAR) 15 mg tablet Take 1 Tab by mouth three (3) times daily.  Indications: Generalized Anxiety Disorder  Qty: 90 Tab, Refills: 2 levETIRAcetam (KEPPRA) 1,000 mg tablet Take 1 Tab by mouth two (2) times a day. Indications: PARTIAL EPILEPSY TREATMENT ADJUNCT  Qty: 60 Tab, Refills: 2      naltrexone (DEPADE) 50 mg tablet Take 1 Tab by mouth daily. Indications: alcoholism  Qty: 30 Tab, Refills: 2      tiotropium (SPIRIVA) 18 mcg inhalation capsule Take 1 Cap by inhalation daily. Qty: 30 Cap, Refills: 2      diclofenac (VOLTAREN) 1 % gel Apply 2 g to affected area four (4) times daily. pyridoxine, vitamin B6, (VITAMIN B-6) 50 mg tablet Take 50 mg by mouth two (2) times a day.      gabapentin (NEURONTIN) 300 mg capsule Take 600 mg by mouth three (3) times daily. Oxygen Indications: 3 LITERS NC AT BEDTIME      traZODone (DESYREL) 50 mg tablet Take 1 Tab by mouth nightly as needed for Sleep. Qty: 30 Tab, Refills: 2      DULoxetine (CYMBALTA) 30 mg capsule Take 3 Caps by mouth daily. Indications: major depressive disorder  Qty: 90 Cap, Refills: 2      pantoprazole (PROTONIX) 40 mg tablet Take 1 Tab by mouth Before breakfast and dinner.   Qty: 60 Tab, Refills: 2          per medical continuation form      -Follow up in office in 2 weeks      Signed:  Alisa Ridley NP  Orthopaedic Nurse Practitioner    8/27/2020  3:26 PM

## 2020-08-27 NOTE — PROGRESS NOTES
Patient given the discharge information and the opportunity to ask questions regarding the discharge information. Patient given medications to take home with him. Patient IV removed with cath tip intact. Patient sent home with follow up appts and personal belongings. Patient is alert and oriented and did not need his discharge caregiver the information.

## 2020-08-27 NOTE — PROGRESS NOTES
ADULT PROTOCOL: JET AEROSOL  REASSESSMENT    Patient  Nat Chacko     62 y.o.   male     8/26/2020  11:02 PM    Breath Sounds Pre Procedure: Right Breath Sounds: Clear, Diminished                               Left Breath Sounds: Clear, Diminished    Breath Sounds Post Procedure: Right Breath Sounds: Clear, Diminished                                 Left Breath Sounds: Clear, Diminished    Breathing pattern: Pre procedure Breathing Pattern: Regular          Post procedure Breathing Pattern: Regular    Heart Rate: Pre procedure Pulse: 112           Post procedure Pulse: 116    Resp Rate: Pre procedure Respirations: 18           Post procedure Respirations: 18        Cough: Pre procedure Cough: Non-productive               Post procedure Cough: Non-productive                Oxygen: O2 Device: Nasal cannula   Flow rate (L/min) 2     Changed: NO    SpO2: Pre procedure SpO2: 96 %   with oxygen              Post procedure SpO2: 100 %  with oxygen    Nebulizer Therapy: Current medications Aerosolized Medications: Brovana, Ipratropium bromide, Pulmicort      Changed: YES    Problem List:   Patient Active Problem List   Diagnosis Code    Major depressive disorder, recurrent episode, severe (HCC) F33.2    [facility] F10.20    Depression F32.9    Elevated hemoglobin A1c R73.09    Spinal stenosis of lumbar region M48.061    MRSA carrier Z22.322    Spinal stenosis of lumbar region with neurogenic claudication M48.062       Respiratory Therapist: Carrie Collado RT

## 2020-11-12 ENCOUNTER — TRANSCRIBE ORDER (OUTPATIENT)
Dept: SCHEDULING | Age: 60
End: 2020-11-12

## 2020-11-12 DIAGNOSIS — M96.1 POSTLAMINECTOMY SYNDROME, LUMBAR REGION: ICD-10-CM

## 2020-11-12 DIAGNOSIS — M71.38 SYNOVIAL CYST OF LUMBAR FACET JOINT: ICD-10-CM

## 2020-11-12 DIAGNOSIS — M54.50 LOW BACK PAIN WITHOUT SCIATICA, UNSPECIFIED BACK PAIN LATERALITY, UNSPECIFIED CHRONICITY: Primary | ICD-10-CM

## 2020-11-12 DIAGNOSIS — M48.061 FORAMINAL STENOSIS OF LUMBAR REGION: ICD-10-CM

## 2020-11-12 DIAGNOSIS — M48.062 SPINAL STENOSIS, LUMBAR REGION, WITH NEUROGENIC CLAUDICATION: ICD-10-CM

## 2020-11-12 DIAGNOSIS — M47.817 LUMBOSACRAL SPONDYLOSIS WITHOUT MYELOPATHY: ICD-10-CM

## 2020-11-12 DIAGNOSIS — M54.31 RIGHT SIDED SCIATICA: ICD-10-CM

## 2020-11-12 DIAGNOSIS — M54.2 CERVICALGIA: ICD-10-CM

## 2021-01-11 ENCOUNTER — HOSPITAL ENCOUNTER (OUTPATIENT)
Dept: MRI IMAGING | Age: 61
Discharge: HOME | End: 2021-01-11
Attending: ORTHOPAEDIC SURGERY
Payer: MEDICAID

## 2021-01-11 DIAGNOSIS — M54.2 CERVICALGIA: ICD-10-CM

## 2021-01-11 DIAGNOSIS — M47.817 LUMBOSACRAL SPONDYLOSIS WITHOUT MYELOPATHY: ICD-10-CM

## 2021-01-11 DIAGNOSIS — M96.1 POSTLAMINECTOMY SYNDROME, LUMBAR REGION: ICD-10-CM

## 2021-01-11 DIAGNOSIS — M54.50 LOW BACK PAIN WITHOUT SCIATICA, UNSPECIFIED BACK PAIN LATERALITY, UNSPECIFIED CHRONICITY: ICD-10-CM

## 2021-01-11 DIAGNOSIS — M48.062 SPINAL STENOSIS, LUMBAR REGION, WITH NEUROGENIC CLAUDICATION: ICD-10-CM

## 2021-01-11 DIAGNOSIS — M71.38 SYNOVIAL CYST OF LUMBAR FACET JOINT: ICD-10-CM

## 2021-01-11 DIAGNOSIS — M48.061 FORAMINAL STENOSIS OF LUMBAR REGION: ICD-10-CM

## 2021-01-11 DIAGNOSIS — M54.31 RIGHT SIDED SCIATICA: ICD-10-CM

## 2021-01-11 PROCEDURE — 72158 MRI LUMBAR SPINE W/O & W/DYE: CPT

## 2021-01-11 PROCEDURE — A9575 INJ GADOTERATE MEGLUMI 0.1ML: HCPCS

## 2021-01-11 PROCEDURE — 74011250636 HC RX REV CODE- 250/636

## 2021-01-11 RX ORDER — GADOTERATE MEGLUMINE 376.9 MG/ML
20 INJECTION INTRAVENOUS ONCE
Status: COMPLETED | OUTPATIENT
Start: 2021-01-11 | End: 2021-01-11

## 2021-01-11 RX ADMIN — GADOTERATE MEGLUMINE 20 ML: 376.9 INJECTION INTRAVENOUS at 13:06

## 2021-05-03 ENCOUNTER — TRANSCRIBE ORDER (OUTPATIENT)
Dept: SCHEDULING | Age: 61
End: 2021-05-03

## 2021-05-03 DIAGNOSIS — M47.812 CERVICAL SPONDYLOSIS WITHOUT MYELOPATHY: ICD-10-CM
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
## 2021-05-03 DIAGNOSIS — R25.1 TREMOR: ICD-10-CM

## 2021-05-03 DIAGNOSIS — M54.50 LUMBAR PAIN: ICD-10-CM

## 2021-05-03 DIAGNOSIS — M47.817 LUMBOSACRAL SPONDYLOSIS WITHOUT MYELOPATHY: ICD-10-CM

## 2021-05-03 DIAGNOSIS — M54.2 CERVICAL PAIN (NECK): Primary | ICD-10-CM

## 2021-05-03 DIAGNOSIS — Z98.1 S/P SPINAL FUSION: ICD-10-CM

## 2021-05-03 DIAGNOSIS — M54.2 CERVICAL PAIN: Primary | ICD-10-CM

## 2021-05-03 DIAGNOSIS — S13.9XXD NECK SPRAIN, SUBSEQUENT ENCOUNTER: ICD-10-CM

## 2021-05-10 ENCOUNTER — OFFICE VISIT (OUTPATIENT)
Dept: NEUROLOGY | Age: 61
End: 2021-05-10
Payer: MEDICAID

## 2021-05-10 VITALS
DIASTOLIC BLOOD PRESSURE: 72 MMHG | OXYGEN SATURATION: 89 % | BODY MASS INDEX: 30.13 KG/M2 | SYSTOLIC BLOOD PRESSURE: 112 MMHG | WEIGHT: 215.2 LBS | RESPIRATION RATE: 16 BRPM | HEART RATE: 82 BPM | HEIGHT: 71 IN

## 2021-05-10 DIAGNOSIS — G25.0 BENIGN ESSENTIAL TREMOR: Primary | ICD-10-CM

## 2021-05-10 DIAGNOSIS — M47.12 CERVICAL SPONDYLOSIS WITH MYELOPATHY: ICD-10-CM

## 2021-05-10 PROCEDURE — 99204 OFFICE O/P NEW MOD 45 MIN: CPT | Performed by: PSYCHIATRY & NEUROLOGY

## 2021-05-10 RX ORDER — ADHESIVE BANDAGE 3/4"
BANDAGE TOPICAL
Status: ON HOLD | COMMUNITY
Start: 2021-04-06 | End: 2021-07-07

## 2021-05-10 RX ORDER — GUAIFENESIN 600 MG/1
1200 TABLET, EXTENDED RELEASE ORAL 2 TIMES DAILY
COMMUNITY
Start: 2021-03-17

## 2021-05-10 RX ORDER — ACETAMINOPHEN 500 MG
TABLET ORAL
COMMUNITY
Start: 2021-05-05

## 2021-05-10 RX ORDER — FLUTICASONE PROPIONATE 50 MCG
SPRAY, SUSPENSION (ML) NASAL
COMMUNITY
Start: 2020-08-05

## 2021-05-10 RX ORDER — DIAZEPAM 10 MG/1
10 TABLET ORAL
Status: ON HOLD | COMMUNITY
Start: 2021-05-06 | End: 2021-07-07

## 2021-05-10 RX ORDER — CYCLOBENZAPRINE HCL 5 MG
TABLET ORAL
COMMUNITY
Start: 2020-08-18 | End: 2021-06-28

## 2021-05-10 RX ORDER — DICLOFENAC POTASSIUM 50 MG/1
50 TABLET, FILM COATED ORAL DAILY
COMMUNITY
Start: 2021-04-12 | End: 2021-06-28

## 2021-05-10 RX ORDER — CYCLOBENZAPRINE HCL 10 MG
10 TABLET ORAL DAILY
Status: ON HOLD | COMMUNITY
Start: 2021-04-12 | End: 2021-07-07

## 2021-05-10 RX ORDER — HYDROXYZINE PAMOATE 50 MG/1
50 CAPSULE ORAL
Status: ON HOLD | COMMUNITY
End: 2021-07-07

## 2021-05-10 RX ORDER — ERGOCALCIFEROL 1.25 MG/1
CAPSULE ORAL
COMMUNITY
Start: 2020-08-05

## 2021-05-10 RX ORDER — IPRATROPIUM BROMIDE 42 UG/1
1 SPRAY, METERED NASAL 3 TIMES DAILY
COMMUNITY
Start: 2020-08-05

## 2021-05-10 NOTE — PATIENT INSTRUCTIONS
Benign Essential Tremor: Care Instructions Your Care Instructions Benign essential tremor is a medical term for shaking that you can't control. Your hand or fingers may shake when you lift a cup or point at something. Or your voice may shake when you speak. This type of tremor is not harmful. It is not caused by a stroke or Parkinson's disease. Some things can affect how much you shake. For example, drinking or eating something with caffeine may make tremors worse for a while. Some medicines also can increase tremors. These include antidepressants and too much thyroid replacement. Talk to your doctor if you think one of your medicines makes your tremors worse. If you are self-conscious about your tremors, there are some things you can do to reduce them or make them less noticeable. This includes taking medicine. Follow-up care is a key part of your treatment and safety. Be sure to make and go to all appointments, and call your doctor if you are having problems. It's also a good idea to know your test results and keep a list of the medicines you take. How can you care for yourself at home? · Take your medicines exactly as prescribed. Call your doctor if you think you are having a problem with your medicine. Some medicines that help control tremors have to be taken every day, even if you are not having tremors. You will get more details on the specific medicines your doctor prescribes. · Get plenty of rest. 
· Eat a balanced, healthy diet. · Try to reduce stress. Regular exercise and massages may help. · Limit alcohol. Heavy drinking can make your tremors worse. · Avoid drinks or foods with caffeine if they make your tremors worse. These include tea, cola, coffee, and chocolate. · Wear a heavy bracelet or watch. This adds a little weight to your hand. The extra weight may reduce tremors. · Drink from cups or glasses that are only half full. You may also want to try drinking with a straw.  
When should you call for help? Watch closely for changes in your health, and be sure to contact your doctor if: 
  · You notice your tremors are getting worse.  
  · You can't do your everyday activities because of your tremors.  
  · You are sad and embarrassed about your shaking. Where can you learn more? Go to http://www.gray.com/ Enter B746 in the search box to learn more about \"Benign Essential Tremor: Care Instructions. \" Current as of: August 4, 2020               Content Version: 12.8 © 7524-9382 [employer]. Care instructions adapted under license by [employer] (which disclaims liability or warranty for this information). If you have questions about a medical condition or this instruction, always ask your healthcare professional. [address] any warranty or liability for your use of this information.
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
## 2021-05-10 NOTE — LETTER
5/11/2021 Patient: Nate Lancaster YOB: 1960 Date of Visit: 5/10/2021 Abena Bhakta MD 
[address] [facility] [address] [id number] Via Fax: [phone] Dear Abena Bhakta MD, Thank you for referring Mr. Nate Lancaster to [facility] for evaluation. My notes for this consultation are attached. If you have questions, please do not hesitate to call me. I look forward to following your patient along with you. Sincerely, Mary Woods MD
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
## 2021-05-12 ENCOUNTER — HOSPITAL ENCOUNTER (OUTPATIENT)
Dept: MRI IMAGING | Age: 61
Discharge: HOME | End: 2021-05-12
Attending: ORTHOPAEDIC SURGERY
Payer: MEDICAID

## 2021-05-12 DIAGNOSIS — M47.817 LUMBOSACRAL SPONDYLOSIS WITHOUT MYELOPATHY: ICD-10-CM

## 2021-05-12 DIAGNOSIS — M47.812 CERVICAL SPONDYLOSIS WITHOUT MYELOPATHY: ICD-10-CM

## 2021-05-12 DIAGNOSIS — M54.50 LUMBAR PAIN: ICD-10-CM

## 2021-05-12 DIAGNOSIS — M54.2 CERVICAL PAIN: ICD-10-CM

## 2021-05-12 PROCEDURE — 72141 MRI NECK SPINE W/O DYE: CPT

## 2021-05-25 ENCOUNTER — HOSPITAL ENCOUNTER (OUTPATIENT)
Dept: MRI IMAGING | Age: 61
Discharge: HOME | End: 2021-05-25
Attending: ORTHOPAEDIC SURGERY
Payer: MEDICAID

## 2021-05-25 VITALS — BODY MASS INDEX: 29.01 KG/M2 | WEIGHT: 208 LBS

## 2021-05-25 DIAGNOSIS — M54.2 CERVICAL PAIN (NECK): ICD-10-CM

## 2021-05-25 DIAGNOSIS — S13.9XXD NECK SPRAIN, SUBSEQUENT ENCOUNTER: ICD-10-CM

## 2021-05-25 DIAGNOSIS — Z98.1 S/P SPINAL FUSION: ICD-10-CM
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
## 2021-05-25 DIAGNOSIS — R25.1 TREMOR: ICD-10-CM

## 2021-05-25 DIAGNOSIS — M47.812 CERVICAL SPONDYLOSIS WITHOUT MYELOPATHY: ICD-10-CM

## 2021-05-25 DIAGNOSIS — M47.817 LUMBOSACRAL SPONDYLOSIS WITHOUT MYELOPATHY: ICD-10-CM

## 2021-05-25 DIAGNOSIS — M54.50 LUMBAR PAIN: ICD-10-CM

## 2021-05-25 PROCEDURE — 74011250636 HC RX REV CODE- 250/636: Performed by: ORTHOPAEDIC SURGERY

## 2021-05-25 PROCEDURE — 72158 MRI LUMBAR SPINE W/O & W/DYE: CPT

## 2021-05-25 PROCEDURE — A9575 INJ GADOTERATE MEGLUMI 0.1ML: HCPCS | Performed by: ORTHOPAEDIC SURGERY

## 2021-05-25 RX ORDER — GADOTERATE MEGLUMINE 376.9 MG/ML
18 INJECTION INTRAVENOUS
Status: COMPLETED | OUTPATIENT
Start: 2021-05-25 | End: 2021-05-25

## 2021-05-25 RX ADMIN — GADOTERATE MEGLUMINE 18 ML: 376.9 INJECTION INTRAVENOUS at 09:55

## 2021-06-28 ENCOUNTER — HOSPITAL ENCOUNTER (OUTPATIENT)
Dept: GENERAL RADIOLOGY | Age: 61
Discharge: HOME | End: 2021-06-28
Attending: ORTHOPAEDIC SURGERY
Payer: MEDICAID

## 2021-06-28 ENCOUNTER — HOSPITAL ENCOUNTER (OUTPATIENT)
Dept: GENERAL RADIOLOGY | Age: 61
End: 2021-06-28
Attending: ORTHOPAEDIC SURGERY
Payer: MEDICAID

## 2021-06-28 ENCOUNTER — HOSPITAL ENCOUNTER (OUTPATIENT)
Dept: PREADMISSION TESTING | Age: 61
Discharge: HOME | End: 2021-06-28
Attending: ORTHOPAEDIC SURGERY
Payer: MEDICAID

## 2021-06-28 VITALS
TEMPERATURE: 98.5 F | DIASTOLIC BLOOD PRESSURE: 82 MMHG | SYSTOLIC BLOOD PRESSURE: 133 MMHG | HEART RATE: 96 BPM | BODY MASS INDEX: 29.1 KG/M2 | WEIGHT: 207.89 LBS | HEIGHT: 71 IN | RESPIRATION RATE: 20 BRPM | OXYGEN SATURATION: 95 %

## 2021-06-28 LAB
25(OH)D3 SERPL-MCNC: 25.9 NG/ML (ref 30–100)
ABO + RH BLD: NORMAL
ALBUMIN SERPL-MCNC: 3.7 G/DL (ref 3.5–5)
ALBUMIN/GLOB SERPL: 0.8 {RATIO} (ref 1.1–2.2)
ALP SERPL-CCNC: 104 U/L (ref 45–117)
ALT SERPL-CCNC: 24 U/L (ref 12–78)
AMPHET UR QL SCN: NEGATIVE
ANION GAP SERPL CALC-SCNC: 4 MMOL/L (ref 5–15)
APPEARANCE UR: CLEAR
AST SERPL-CCNC: 16 U/L (ref 15–37)
ATRIAL RATE: 98 BPM
BACTERIA URNS QL MICRO: NEGATIVE /HPF
BARBITURATES UR QL SCN: NEGATIVE
BENZODIAZ UR QL: NEGATIVE
BILIRUB SERPL-MCNC: 0.6 MG/DL (ref 0.2–1)
BILIRUB UR QL: NEGATIVE
BLOOD GROUP ANTIBODIES SERPL: NORMAL
BUN SERPL-MCNC: 16 MG/DL (ref 6–20)
BUN/CREAT SERPL: 15 (ref 12–20)
CALCIUM SERPL-MCNC: 9.6 MG/DL (ref 8.5–10.1)
CALCULATED P AXIS, ECG09: 58 DEGREES
CALCULATED R AXIS, ECG10: 15 DEGREES
CALCULATED T AXIS, ECG11: 32 DEGREES
CANNABINOIDS UR QL SCN: NEGATIVE
CHLORIDE SERPL-SCNC: 103 MMOL/L (ref 97–108)
CO2 SERPL-SCNC: 30 MMOL/L (ref 21–32)
COCAINE UR QL SCN: NEGATIVE
COLOR UR: NORMAL
CREAT SERPL-MCNC: 1.04 MG/DL (ref 0.7–1.3)
DIAGNOSIS, 93000: NORMAL
DRUG SCRN COMMENT,DRGCM: ABNORMAL
EPITH CASTS URNS QL MICRO: NORMAL /LPF
ERYTHROCYTE [DISTWIDTH] IN BLOOD BY AUTOMATED COUNT: 14.8 % (ref 11.5–14.5)
GLOBULIN SER CALC-MCNC: 4.4 G/DL (ref 2–4)
GLUCOSE SERPL-MCNC: 97 MG/DL (ref 65–100)
GLUCOSE UR STRIP.AUTO-MCNC: NEGATIVE MG/DL
HCT VFR BLD AUTO: 40.3 % (ref 36.6–50.3)
HGB BLD-MCNC: 13 G/DL (ref 12.1–17)
HGB UR QL STRIP: NEGATIVE
HYALINE CASTS URNS QL MICRO: NORMAL /LPF (ref 0–5)
INR PPP: 1 (ref 0.9–1.1)
KETONES UR QL STRIP.AUTO: NEGATIVE MG/DL
LEUKOCYTE ESTERASE UR QL STRIP.AUTO: NEGATIVE
MCH RBC QN AUTO: 27.3 PG (ref 26–34)
MCHC RBC AUTO-ENTMCNC: 32.3 G/DL (ref 30–36.5)
MCV RBC AUTO: 84.5 FL (ref 80–99)
METHADONE UR QL: NEGATIVE
NITRITE UR QL STRIP.AUTO: NEGATIVE
NRBC # BLD: 0 K/UL (ref 0–0.01)
NRBC BLD-RTO: 0 PER 100 WBC
OPIATES UR QL: POSITIVE
P-R INTERVAL, ECG05: 154 MS
PCP UR QL: NEGATIVE
PH UR STRIP: 5.5 [PH] (ref 5–8)
PLATELET # BLD AUTO: 244 K/UL (ref 150–400)
PMV BLD AUTO: 9.1 FL (ref 8.9–12.9)
POTASSIUM SERPL-SCNC: 4 MMOL/L (ref 3.5–5.1)
PREALB SERPL-MCNC: 31 MG/DL (ref 20–40)
PROT SERPL-MCNC: 8.1 G/DL (ref 6.4–8.2)
PROT UR STRIP-MCNC: NEGATIVE MG/DL
PROTHROMBIN TIME: 10.4 SEC (ref 9–11.1)
Q-T INTERVAL, ECG07: 358 MS
QRS DURATION, ECG06: 80 MS
QTC CALCULATION (BEZET), ECG08: 457 MS
RBC # BLD AUTO: 4.77 M/UL (ref 4.1–5.7)
RBC #/AREA URNS HPF: NORMAL /HPF (ref 0–5)
SODIUM SERPL-SCNC: 137 MMOL/L (ref 136–145)
SP GR UR REFRACTOMETRY: 1.02 (ref 1–1.03)
SPECIMEN EXP DATE BLD: NORMAL
UA: UC IF INDICATED,UAUC: NORMAL
UROBILINOGEN UR QL STRIP.AUTO: 0.2 EU/DL (ref 0.2–1)
VENTRICULAR RATE, ECG03: 98 BPM
WBC # BLD AUTO: 5.4 K/UL (ref 4.1–11.1)
WBC URNS QL MICRO: NORMAL /HPF (ref 0–4)

## 2021-06-28 PROCEDURE — 82306 VITAMIN D 25 HYDROXY: CPT

## 2021-06-28 PROCEDURE — 97161 PT EVAL LOW COMPLEX 20 MIN: CPT | Performed by: PHYSICAL THERAPIST

## 2021-06-28 PROCEDURE — 97530 THERAPEUTIC ACTIVITIES: CPT | Performed by: PHYSICAL THERAPIST

## 2021-06-28 PROCEDURE — 80307 DRUG TEST PRSMV CHEM ANLYZR: CPT

## 2021-06-28 PROCEDURE — 36415 COLL VENOUS BLD VENIPUNCTURE: CPT

## 2021-06-28 PROCEDURE — 83036 HEMOGLOBIN GLYCOSYLATED A1C: CPT

## 2021-06-28 PROCEDURE — 86901 BLOOD TYPING SEROLOGIC RH(D): CPT

## 2021-06-28 PROCEDURE — 85027 COMPLETE CBC AUTOMATED: CPT

## 2021-06-28 PROCEDURE — 81001 URINALYSIS AUTO W/SCOPE: CPT

## 2021-06-28 PROCEDURE — 71046 X-RAY EXAM CHEST 2 VIEWS: CPT

## 2021-06-28 PROCEDURE — 85610 PROTHROMBIN TIME: CPT

## 2021-06-28 PROCEDURE — 80053 COMPREHEN METABOLIC PANEL: CPT

## 2021-06-28 PROCEDURE — 84134 ASSAY OF PREALBUMIN: CPT

## 2021-06-28 PROCEDURE — 93005 ELECTROCARDIOGRAM TRACING: CPT

## 2021-06-28 RX ORDER — HYDROXYZINE 25 MG/1
TABLET, FILM COATED ORAL
COMMUNITY

## 2021-06-28 RX ORDER — LANOLIN ALCOHOL/MO/W.PET/CERES
1000 CREAM (GRAM) TOPICAL DAILY
COMMUNITY

## 2021-06-28 RX ORDER — DICLOFENAC POTASSIUM 50 MG/1
50 TABLET, FILM COATED ORAL
COMMUNITY
End: 2021-07-14

## 2021-06-28 RX ORDER — VANCOMYCIN/0.9 % SOD CHLORIDE 1.5G/250ML
1500 PLASTIC BAG, INJECTION (ML) INTRAVENOUS ONCE
Status: CANCELLED | OUTPATIENT
Start: 2021-07-06 | End: 2021-07-06

## 2021-06-28 RX ORDER — METOPROLOL SUCCINATE 25 MG/1
25 TABLET, EXTENDED RELEASE ORAL DAILY
COMMUNITY
End: 2021-07-14

## 2021-06-28 RX ORDER — OXYCODONE HYDROCHLORIDE 5 MG/1
7.5 TABLET ORAL
COMMUNITY
End: 2021-07-14

## 2021-06-28 RX ORDER — DULOXETIN HYDROCHLORIDE 60 MG/1
120 CAPSULE, DELAYED RELEASE ORAL DAILY
COMMUNITY

## 2021-06-28 RX ORDER — TRAZODONE HYDROCHLORIDE 100 MG/1
100 TABLET ORAL
COMMUNITY

## 2021-06-28 RX ORDER — RISPERIDONE 1 MG/1
1 TABLET, FILM COATED ORAL 3 TIMES DAILY
COMMUNITY

## 2021-06-28 NOTE — PERIOP NOTES
Orthopedic and Spine Patients: Instructions on When You Can   Eat or Drink Before Surgery      You have been provided 2 pre-surgery drinks received at your pre-admission testing appointment.  Night before surgery:  o You should drink one bottle of the  pre-surgery drink at bedtime. No food after midnight!  Day of Surgery:  o Complete 2nd bottle of the pre-surgery drink 1 hour prior to arrival at hospital.  For questions call Pre-Admission Testing at [phone]. They are available from 8:00am-5:00pm, Monday through Friday.

## 2021-06-28 NOTE — PERIOP NOTES
[address] [facility]  \"We've Got Your Back! Caring For Yourself Before and After Spine Surgery\" handbook provided & reviewed during pre-admission appointment. Opportunity for questions provided, patient verbalized understanding.

## 2021-06-28 NOTE — PROGRESS NOTES
[facility]  Physical Therapy Pre-surgery evaluation  [address] [facility], [address] [facility]    PHYSICAL THERAPY PRE SPINE SURGERY EVALUATION  Patient: Sherri Powers (83 y.o. male)  Date: 6/28/2021  Primary Diagnosis: pat  Procedure(s) (LRB):  CERVICAL 3 TO CERVICAL 7 ANTERIOR CERVICAL DISCECTOMY AND FUSION (ANES. CHOICE) (N/A)     Precautions: falls       ASSESSMENT :  Based on the objective data described below, the patient presents with impaired mobility and balance with constant R sided neck and R UE pain due to end stage degenerative joint disease in the cervical spine. Patient with chronic pain and is followed at a pain clinic    Discussed anticipated disposition to home with possible discharge within a 1 to 2 day time frame post-surgery. Patient is in agreement. 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 He reports his  is also supportive and able to assist if needed    Anticipate patient will need acute PT and OT orders based on anticipated deficits post surgery. GOALS: (Goals have been discussed and agreed upon with patient.)  DISCHARGE GOALS: Time Frame: 1 DAY  1. Patient will demonstrate increased strength, range of motion, and pain control via a home exercise program in order to minimize functional deficits in preparation for their upcoming surgery. This will be achieved by using education, demonstration  and through the use of an informational handout including a home exercise program.  REHABILITATION POTENTIAL FOR STATED GOALS: Good     RECOMMENDATIONS AND PLANNED INTERVENTIONS: (Benefits and precautions of physical therapy have been discussed with the patient.)  · Home Exercise Program  TREATMENT PLAN EFFECTIVE DATES: 6/28/2021 to 6/28/2021  FREQUENCY/DURATION: Patient to continue to perform home exercise program at least twice daily until surgery. SUBJECTIVE:   Patient stated My pain is constant.     OBJECTIVE DATA SUMMARY:   HISTORY:      Past Medical History:   Diagnosis Date    Arthritis     Chronic kidney disease     Chronic obstructive pulmonary disease (HCC)     O2 2L sitting - 3L walking -- 24hr/day    Chronic pain     BACK AND LEGS    Elevated hemoglobin A1c 9/25/2019    H/O cardiovascular stress test 06/03/2019    6/3/19 \"Negative for ischemia. Preserved LV function. EF 65%. No wall motion abnormalities. \" per Dr Dexter Atkins notes 6/3/19.    H/O ETOH abuse     [facility]     Hypertension     Ill-defined condition     ANXIETY    Ill-defined condition 2009    LUNG COLLAPSED    Ill-defined condition     USES O2 3L AT NIGHT    MRSA carrier 9/26/2019    Opioid dependence ([facility]     per PCP clearance 2020    Psychiatric disorder     DEPRESSION    Seizures ([facility] 2014    Sleep apnea 2020    needs CPAP - does not have it yet.  Stroke [facility]) 2015    \"mini-stroke'     Past Surgical History:   Procedure Laterality Date    HX HERNIA REPAIR Right 2015    HX HERNIA REPAIR Left 2015    HX OTHER SURGICAL  2009    CHEST TUBE WITH COLLAPSED LUNGS    HX ROTATOR CUFF REPAIR Right 2016    HX ROTATOR CUFF REPAIR Left 2016       Prior Level of Function/Home Situation: patient reports modified independence with overall mobility and ADLs; CNA completes IADLs  Personal factors and/or comorbidities impacting plan of care:  Patient has CNA 6 days/wk from [address]: Trailer/mobile home  # Steps to Enter: 4  Rails to Enter: Yes  Hand Rails : Bilateral  One/Two Story Residence: One story  Living Alone: Yes  Support Systems: Home care staff, [employer] community (6 days/wk, 9-5)  Current DME Used/Available at Home: Shower chair, [employer], straight, Walker, rolling, Adaptive dressing aides  Tub or Shower Type: Tub/Shower combination    EXAMINATION/PRESENTATION/DECISION MAKING:     ADLs (Current Functional Status):    Bathing/Showering:   [x] Independent  [] Requires Assistance from Someone  [] Sponge Bath Only Ambulation:  [] Independent  [] Walk Indoors Only  [] Walk Outdoors  [x] Use Assistive Device  [] Use Wheelchair Only     Dressing:  [x] Independent    Requires Assistance from Someone for:  [] Sock/Shoes  [] Pants  [] Everything   Household Activities:  [] Routine house and yard work  [x] Light Housework Only  [] None         Critical Behavior:  Neurologic State: Appropriate for age  Orientation Level: Oriented X4          Strength:    Strength: Generally decreased, functional                        Tone & Sensation:   Tone: Normal              Sensation: Intact                   Range Of Motion:  AROM: Generally decreased, functional                       Coordination:  Coordination: Within functional limits    Functional Mobility:  Transfers:  Sit to Stand: Modified independent  Stand to Sit: Modified independent                       Balance:   Sitting: Intact  Standing: Impaired  Standing - Static: Good  Standing - Dynamic : Good (using cane)  Ambulation/Gait Training:  Distance (ft): 20 Feet (ft)  Assistive Device: Cane, straight  Ambulation - Level of Assistance: Modified independent        Gait Abnormalities: Antalgic (mild)        Base of Support: Widened     Speed/Dee: Pace decreased (<100 feet/min)  Step Length: Left shortened, Right shortened         Gait is slow but steady                   Functional Measure:  10 Meter walk test:  (Specify if any supplemental oxygen is used, the type, pre, during and post sats.)        not able to complete secondary to personal O2 tank empty. Completed Timed Up and Go and scored 16.63 which indicates increased risk of falls                         Walking Speed (m/s)  Modifier Scale Age 52-63 Age 61-76 Age 66-77 Age 80-80    Male Female Male Female Male Female Male Female   CH   0% Impaired ?  1.39 ? 1.40 ? 1.36 ? 1.30 ? 1.33 ? 1.27 ? 1.21 ? 1.15   CI   1-19% Impaired 1.11-1.38 1.12-1.39 1.09-1.35 1.04-1.29 1.06-1.32 1.01-1.26 0.96-1.20 0.92-1.14   CJ   20-39% Impaired 0.83-1.10 0.84-1.11 0.82-1.08 0.78-1.03 0.80-1.05 0.76-1.00 0.72-0.95 0.69-0.91   CK   40-59% Impaired 0.56-0.82 0.57-0.83 0.54-0.81 0.52-0.77 0.53-0.79 0.51-0.75 0.48-0.71 0.46-0.68   CL   60-79% Impaired 0.28-0.55 0.28-0.56 0.27-0.53 0.26-0.51 0.27-0.52 0.25-0.50 0.24-0.49 0.23-0.45   CM   80-99% Impaired 0.01-0.28 < 0.01-0.28 < 0.01-0.27 < 0.01-0.26 0.01-0.27 0.01-0.24 0.01-0.23 0.01-0.22   CN   100% Impaired Cannot Perform   Minimal Detectable Change (MDC-90) = 0.1 m/s  Bree BARNES. \"Comfortable and maximum walking speed of adults aged 20-79 years: reference values and determinants. \" Age and Agin Volume 26(1):15-9. Josemanuel Ferguson. \"Age- and gender-related test performance in community-dwelling elderly people: Six-Minute Walk Test, Wright Balance Scale, Timed Up & Go Test, and gait speeds. \" Physical Therapy: 2002 Volume 82(2):128-37. Severo Pomfret DM, Cayla PW, Cathy AMESD, [facility] D. \"Assessing stability and change of four performance measures: a longitudinal study evaluating outcome following total hip and knee arthroplasty. \" [facility] Musculoskeletal Disorders: 2005 Volume 6(3). Silva Bunn, PhD; Gina Teresa, PhD. Cristal Hurtado Paper: \"Walking Speed: the Sixth Vital Sign\" Journal of Geriatric Physical Therapy: 2009 - Volume 32 - Issue 2 - p 2-5 . Pain:      Patient reports constant pain in R side of neck and R UE                Radicular Pain:   Patient reports pain radiating down R UE to hand and in RLE to knee    Activity Tolerance:   Fair; requires O2 for activity   Patient [x]   does  []   does not demonstrate signs/symptoms of shortness of breath/dyspnea on exertion/respiratory distress. Patient reports requiring supplemental O2 for activity.  Patient's personal tank is empty- nursing notifed   COMMUNICATION/EDUCATION:   The patient was educated on:  [x]         Early post operative mobility is imperative to achieve a patient's desired outcomes and to restore biological function. [x]         Post operative spinal precautions may/may not be applicable. These precautions are based on the patient's physician and the procedure(s) performed. [x]         Spinal precautions including:  ·   No bending forward, sideways, or backwards  ·   No twisting   ·   No lifting more than 5-10 pounds  ·   No sitting longer than 30-60 minutes at a time  ·   Cervical aspen collar should be on at all times    The patients plan of care was discussed as follows:   [x]         The patient verbalized understanding of her plan in preparation for their upcoming surgery  []         The patient's  was present for this session  []        The patient reports that he/she does not have a  identified at this time  []         The  verbalized understanding of the education regarding the patient's upcoming surgery  [x]         Patient/family agree to work toward stated goals and plan of care. []         Patient understands intent and goals of therapy, but is neutral about his/her participation. []         Patient is unable to participate in goal setting and plan of care.       Thank you for this referral.  Jo Barron, PT

## 2021-06-28 NOTE — PERIOP NOTES
Incentive Spirometer        Using the incentive spirometer helps expand the small air sacs of your lungs, helps you breathe deeply, and helps improve your lung function. Use your incentive spirometer twice a day (10 breaths each time) prior to surgery. How to Use Your Incentive Spirometer:  1. Hold the incentive spirometer in an upright position. 2. Breathe out as usual.   3. Place the mouthpiece in your mouth and seal your lips tightly around it. 4. Take a deep breath. Breathe in slowly and as deeply as possible. Keep the blue flow rate guide between the arrows. 5. Hold your breath as long as possible. Then exhale slowly and allow the piston to fall to the bottom of the column. 6. Rest for a few seconds and repeat steps one through five at least 10 times. PAT [place] Volume__1500___  x__2__  Date_6/28/21_    Issa Pedrozaet THE INCENTIVE SPIROMETER WITH YOU TO THE HOSPITAL ON THE DAY OF YOUR SURGERY. Opportunity given to ask and answer questions as well as to observe return demonstration.       Patient signature_____________________________          Witness____________________________

## 2021-06-28 NOTE — PERIOP NOTES
[facility]  Joint/Spine Preoperative Instructions        Surgery Date Tuesday, July 6th          Time of Arrival 5:45am    1. On the day of your surgery, please report to the Surgical Services Registration Desk and sign in at your designated time. The Surgery Center is located to the right of the Emergency Room. 2. You must have someone with you to drive you home. You should not drive a car for 24 hours following surgery. Please make arrangements for a friend or family member to stay with you for the first 24 hours after your surgery. 3. No food after midnight Monday, July 5th. Medications morning of surgery should be taken with a sip of water. Please follow pre-surgery drink instructions that were given at your Pre Admission Testing appointment. 4. We recommend you do not drink any alcoholic beverages for 24 hours before and after your surgery. 5. Contact your surgeons office for instructions on the following medications: non-steroidal anti-inflammatory drugs (i.e. Advil, Aleve), vitamins, and supplements. (Some surgeons will want you to stop these medications prior to surgery and others may allow you to take them)  **If you are currently taking Plavix, Coumadin, Aspirin and/or other blood-thinning agents, contact your surgeon for instructions. ** Your surgeon will partner with the physician prescribing these medications to determine if it is safe to stop or if you need to continue taking. Please do not stop taking these medications without instructions from your surgeon    6. Wear comfortable clothes. Wear glasses instead of contacts. Do not bring any money or jewelry. Please bring picture ID, insurance card, and any prearranged co-payment or hospital payment. Do not wear make-up, particularly mascara the morning of your surgery. Do not wear nail polish, particularly if you are having foot /hand surgery.   Wear your hair loose or down, no ponytails, buns, rosalio pins or clips. All body piercings must be removed. Please shower with antibacterial soap for three consecutive days before and on the morning of surgery, but do not apply any lotions, powders or deodorants after the shower on the day of surgery. Please use a fresh towels after each shower. Please sleep in clean clothes and change bed linens the night before surgery. Please do not shave for 48 hours prior to surgery. Shaving of the face is acceptable. 7. You should understand that if you do not follow these instructions your surgery may be cancelled. If your physical condition changes (I.e. fever, cold or flu) please contact your surgeon as soon as possible. 8. It is important that you be on time. If a situation occurs where you may be late, please call [phone] (OR Holding Area). 9. If you have any questions and or problems, please call [phone] (Pre-admission Testing). 10. Your surgery time may be subject to change. You will receive a phone call the evening prior if your time changes. 11.  If having outpatient surgery, you must have someone to drive you here, stay with you during the duration of your stay, and to drive you home at time of discharge. 12. The following link is for the educational video for patients and/or families. http://yeh-lee.org/. [email or web address]-[email or web address]/[place]/[facility]/educational-materials    Special Instructions: Bring inhaler day of surgery    TAKE ALL MEDICATIONS THE DAY OF SURGERY EXCEPT: follow surgeon instructions for holding all non-steroidal anti-inflammatory drugs (NSAIDs-diclofenac), blood-thinning agents, vitamins & supplements (ferrous sulfate/iron, vitamins B12 & D)prior to surgery. I understand a pre-operative phone call will be made to verify my surgery time.   In the event that I am not available, I give permission for a message to be left on my answering service and/or with another person?   yes         ___________________      __________   _________    (Signature of Patient)             (Witness)                (Date and Time)

## 2021-06-28 NOTE — PERIOP NOTES
Hibiclens/Chlorhexidine    Preventing Infections Before and After - Your Surgery    IMPORTANT INSTRUCTIONS    Please read and follow these instructions carefully. If you are unable to comply with the below instructions your procedure will be cancelled. Every Night for Three (3) nights before your surgery:  1. Shower with an antibacterial soap, such as Dial, or the soap provided at your preassessment appointment. A shower is better than a bath for cleaning your skin. 2. If needed, ask someone to help you reach all areas of your body. Dont forget to clean your belly button with every shower. The night before your surgery: If you lose your Hibiclens/chlorhexidine please contact surgery center or you can purchase it at a local pharmacy  1. On the night before your surgery, shower with an antibacterial soap, such as Dial, or the soap provided at your preassessment appointment. 2. With one packet of Hibiclens/Chlorhexidine in hand, turn water off.  3. Apply Hibiclens antiseptic skin cleanser with a clean, freshly washed washcloth. ? Gently apply to your body from chin to toes (except the genital area) and especially the area(s) where your incision(s) will be. ? Leave Hibiclens/Chlorhexidine on your skin for at least 20 seconds. CAUTION: If needed, Hibiclens/chlorhexidine may be used to clean the folds of skin of the legs (such as in the area of the groin) and on your buttocks and hips. However, do not use Hibiclens/Chlorhexidine above the neck or in the genital area (your bottom) or put inside any area of your body. 4. Turn the water back on and rinse. 5. Dry gently with a clean, freshly washed towel. 6. After your shower, do not use any powder, deodorant, perfumes or lotion. 7. Use clean, freshly washed towels and washcloths every time you shower. 8. Wear clean, freshly washed pajamas to bed the night before surgery. 9. Sleep on clean, freshly washed sheets.   10. Do not allow pets to sleep in your bed with you. The Morning of your surgery:  1. Shower again thoroughly with an antibacterial soap, such as Dial or the soap provided at your preassessment appointment. If needed, ask someone for help to reach all areas of your body. Dont forget to clean your belly button! Rinse. 2. Dry gently with a clean, freshly washed towel. 3. After your shower, do not use any powder, deodorant, perfumes or lotion prior to surgery. 4. Put on clean, freshly washed clothing. Tips to help prevent infections after your surgery:  1. Protect your surgical wound from germs:  ? Hand washing is the most important thing you and your caregivers can do to prevent infections. ? Keep your bandage clean and dry! ? Do not touch your surgical wound. 2. Use clean, freshly washed towels and washcloths every time you shower; do not share bath linens with others. 3. Until your surgical wound is healed, wear clothing and sleep on bed linens each day that are clean and freshly washed. 4. Do not allow pets to sleep in your bed with you or touch your surgical wound. 5. Do not smoke - smoking delays wound healing. This may be a good time to stop smoking. 6. If you have diabetes, it is important for you to manage your blood sugar levels properly before your surgery as well as after your surgery. Poorly managed blood sugar levels slow down wound healing and prevent you from healing completely. If you lose your Hibiclens/chlorhexidine, please call the [facility], or it is available for purchase at your pharmacy.                ___________________      ___________________      ________________  (Signature of Patient)          (Witness)                   (Date and Time)

## 2021-06-29 LAB
BACTERIA SPEC CULT: NORMAL
BACTERIA SPEC CULT: NORMAL
EST. AVERAGE GLUCOSE BLD GHB EST-MCNC: 100 MG/DL
HBA1C MFR BLD: 5.1 % (ref 4–5.6)
SERVICE CMNT-IMP: NORMAL

## 2021-06-29 NOTE — ADVANCED PRACTICE NURSE
PAT Nurse Practitioner   Pre-Operative Chart Review/Assessment:-ORTHOPEDIC/NEUROSURGICAL SPINE                Patient Name:  Sam Black                                                         Age:   62 y.o.    :  1960     Today's Date:  2021     Date of PAT:   21      Date of Surgery:    21     Procedure(s):    C3-C7 ACDF     Surgeon:   Kevin William     Medical Clearance:  AMEENA Olguin                   PLAN:      1)  Cardiac Clearance:  Not requested       2)  Diabetic Treatment Consult:  Not indicated-A1C 5.1      3)  Sleep Apnea evaluation:   Has severe TANIA-NOT COMPLIANT W/CPAP       4) Treatment for MRSA/Staph Aureus:  Negative       5) Additional Concerns:  COPD, bullous emphysema, on O2 at 3 L/NC w/ activity/exertion, chronic pain, seizures secondary to ETOH abuse, CHF, TIA , anxiety/depression, former smoker                 Vital Signs:         Vitals:    21 1600   BP: 133/82   Pulse: 96   Resp: 20   Temp: 98.5 °F (36.9 °C)   SpO2: 95%   Weight: 94.3 kg (207 lb 14.3 oz)   Height: 5' 11\" (1.803 m)            ____________________________________________  PAST MEDICAL HISTORY  Past Medical History:   Diagnosis Date    Anxiety     Arthritis     Bullous emphysema (HCC)     Chronic obstructive pulmonary disease (HCC)     O2 2L sitting - 3L walking -- 24hr/day    Chronic pain     BACK AND LEGS    Elevated hemoglobin A1c 2019    GERD (gastroesophageal reflux disease)     H/O cardiovascular stress test 2019    6/3/19 \"Negative for ischemia. Preserved LV function. EF 65%. No wall motion abnormalities. \" per Dr Myrna Schaumann notes 6/3/19.    H/O ETOH abuse     Hypertension     MRSA carrier 2019    On home oxygen therapy     uses 3L with activity    Opioid dependence ([address]     per PCP clearance     Pneumothorax 2009 & 2010    right & left    Psychiatric disorder     DEPRESSION    Seizures ([address]     Sleep apnea     not currently using CPAP     Stroke [facility]) 2015    \"mini-stroke'      ____________________________________________  PAST SURGICAL HISTORY  Past Surgical History:   Procedure Laterality Date    HX HERNIA REPAIR Right 2015    HX HERNIA REPAIR Left 2015    HX OTHER SURGICAL  2009 & 2010    CHEST TUBE WITH COLLAPSED LUNGS    HX ROTATOR CUFF REPAIR Right 2016    HX ROTATOR CUFF REPAIR Left 2016      ____________________________________________  HOME MEDICATIONS    Current Outpatient Medications   Medication Sig    metoprolol succinate (TOPROL-XL) 25 mg XL tablet Take 25 mg by mouth daily.  hydrOXYzine HCL (ATARAX) 25 mg tablet Take  by mouth three (3) times daily as needed.  traZODone (DESYREL) 100 mg tablet Take 100 mg by mouth nightly. Take 2-3 tablets    diclofenac potassium (CATAFLAM) 50 mg tablet Take 50 mg by mouth two (2) times daily as needed.  DULoxetine (CYMBALTA) 60 mg capsule Take 120 mg by mouth daily.  risperiDONE (RisperDAL) 1 mg tablet Take 1 mg by mouth three (3) times daily.  cyanocobalamin (Vitamin B-12) 1,000 mcg tablet Take 1,000 mcg by mouth daily.  oxyCODONE IR (ROXICODONE) 5 mg immediate release tablet Take 7.5 mg by mouth three (3) times daily as needed for Pain.  acetaminophen (TYLENOL) 500 mg tablet TAKE 2 TABLETS BY MOUTH EVERY 6 HOURS AS NEEDED FOR MILD PAIN OR MODERATE PAIN FOR UP TO 10 DAYS    Ear Drops 6.5 % otic solution INSTILL 5 DROPS INTO BOTH EARS 2 TIMES PER DAY FOR 5 DAYS    cyclobenzaprine (FLEXERIL) 10 mg tablet Take 10 mg by mouth daily.  diazePAM (VALIUM) 10 mg tablet Take 10 mg by mouth.  ergocalciferol (ERGOCALCIFEROL) 1,250 mcg (50,000 unit) capsule TAKE 1 CAPSULE BY ORAL ROUTE WEEKLY FOR 90 DAYS    fluticasone propionate (FLONASE) 50 mcg/actuation nasal spray INSTILL 2 SPRAYS IN THE NOSTRILS DAILY    Mucinex 600 mg ER tablet 1,200 mg two (2) times a day.  hydrOXYzine pamoate (VISTARIL) 50 mg capsule Take 50 mg by mouth.     ipratropium (ATROVENT) 42 mcg (0.06 %) nasal spray 1 [place] by Nasal route three (3) times daily. 1-2 squirts in the nostrils    senna (Senexon) 8.6 mg tablet Take 2 Tabs by mouth two (2) times a day.  ferrous sulfate 325 mg (65 mg iron) tablet Take  by mouth Daily (before breakfast).  montelukast (SINGULAIR) 10 mg tablet Take 10 mg by mouth nightly.  pyridoxine, vitamin B6, (VITAMIN B-6) 50 mg tablet Take 50 mg by mouth two (2) times a day.  gabapentin (NEURONTIN) 800 mg tablet Take 800 mg by mouth three (3) times daily.  ondansetron hcl (ZOFRAN) 4 mg tablet Take 4 mg by mouth every six (6) hours as needed for Nausea.  albuterol-ipratropium (DUO-NEB) 2.5 mg-0.5 mg/3 ml nebu 3 mL by Nebulization route every six (6) hours as needed.  Oxygen Indications: 3 LITERS NC AT BEDTIME    atorvastatin (LIPITOR) 40 mg tablet Take 40 mg by mouth nightly.  baclofen (LIORESAL) 10 mg tablet Take 10 mg by mouth three (3) times daily.  budesonide-formoterol (SYMBICORT) 80-4.5 mcg/actuation HFAA Take 2 Puffs by inhalation two (2) times a day. Indications: BRONCHOSPASM PREVENTION WITH COPD    busPIRone (BUSPAR) 15 mg tablet Take 1 Tab by mouth three (3) times daily. Indications: Generalized Anxiety Disorder (Patient taking differently: Take 30 mg by mouth two (2) times a day. Indications: repeated episodes of anxiety)    levETIRAcetam (KEPPRA) 1,000 mg tablet Take 1 Tab by mouth two (2) times a day. Indications: PARTIAL EPILEPSY TREATMENT ADJUNCT    naltrexone (DEPADE) 50 mg tablet Take 1 Tab by mouth daily. Indications: alcoholism    pantoprazole (PROTONIX) 40 mg tablet Take 1 Tab by mouth Before breakfast and dinner. (Patient taking differently: Take 40 mg by mouth daily.)    tiotropium (SPIRIVA) 18 mcg inhalation capsule Take 1 Cap by inhalation daily. (Patient taking differently: Take 1 Capsule by inhalation two (2) times a day.)     No current facility-administered medications for this encounter. ____________________________________________  ALLERGIES  Allergies   Allergen Reactions    Chantix [Varenicline] Seizures      ____________________________________________  SOCIAL HISTORY  Social History     Tobacco Use    Smoking status: Former Smoker     Packs/day: 1.00     Years: 30.00     Pack years: 30.00     Quit date:      Years since quittin.4    Smokeless tobacco: Never Used   Substance Use Topics    Alcohol use: Yes     Comment: \"a beer every other day, liquor on special occassions\"      ____________________________________________        Labs:     Hospital Outpatient Visit on 2021   Component Date Value Ref Range Status    WBC 2021 5.4  4.1 - 11.1 K/uL Final    RBC 2021 4.77  4.10 - 5.70 M/uL Final    HGB 2021 13.0  12.1 - 17.0 g/dL Final    HCT 2021 40.3  36.6 - 50.3 % Final    MCV 2021 84.5  80.0 - 99.0 FL Final    MCH 2021 27.3  26.0 - 34.0 PG Final    MCHC 2021 32.3  30.0 - 36.5 g/dL Final    RDW 2021 14.8* 11.5 - 14.5 % Final    PLATELET  434  150 - 400 K/uL Final    MPV 2021 9.1  8.9 - 12.9 FL Final    NRBC 2021 0.0  0  WBC Final    ABSOLUTE NRBC 2021 0.00  0.00 - 0.01 K/uL Final    Special Requests: 2021 NO SPECIAL REQUESTS    Final    Culture result: 2021 MRSA NOT PRESENT    Final    Culture result: 2021 Screening of patient nares for MRSA is for surveillance purposes and, if positive, to facilitate isolation considerations in high risk settings. It is not intended for automatic decolonization interventions per se as regimens are not sufficiently effective to warrant routine use.     Final    Ventricular Rate 2021 98  BPM Final    Atrial Rate 2021 98  BPM Final    P-R Interval 2021 154  ms Final    QRS Duration 2021 80  ms Final    Q-T Interval 2021 358  ms Final    QTC Calculation (Bezet) 2021 457  ms Final    Calculated P Axis 06/28/2021 58  degrees Final    Calculated R Axis 06/28/2021 15  degrees Final    Calculated T Axis 06/28/2021 32  degrees Final    Diagnosis 06/28/2021    Final                    Value:Normal sinus rhythm  Normal ECG  When compared with ECG of 24-SEP-2019 11:46,  No significant change was found  Confirmed by Sammy Wang ([id number]) on 6/28/2021 3:16:34 PM      Hemoglobin A1c 06/28/2021 5.1  4.0 - 5.6 % Final    Comment: NEW METHOD  PLEASE NOTE NEW REFERENCE RANGE  (NOTE)  HbA1C Interpretive Ranges  <5.7              Normal  5.7 - 6.4         Consider Prediabetes  >6.5              Consider Diabetes      Est. average glucose 06/28/2021 100  mg/dL Final    INR 06/28/2021 1.0  0.9 - 1.1   Final    A single therapeutic range for Vit K antagonists may not be optimal for all indications - see June, 2008 issue of Chest, American College of Chest Physicians Evidence-Based Clinical Practice Guidelines, 8th Edition.  Prothrombin time 06/28/2021 10.4  9.0 - 11.1 sec Final    Color 06/28/2021 YELLOW/STRAW    Final    Color Reference Range: Straw, Yellow or Dark Yellow    Appearance 06/28/2021 CLEAR  CLEAR   Final    Specific gravity 06/28/2021 1.020  1.003 - 1.030   Final    pH (UA) 06/28/2021 5.5  5.0 - 8.0   Final    Protein 06/28/2021 Negative  NEG mg/dL Final    Glucose 06/28/2021 Negative  NEG mg/dL Final    Ketone 06/28/2021 Negative  NEG mg/dL Final    Bilirubin 06/28/2021 Negative  NEG   Final    Blood 06/28/2021 Negative  NEG   Final    Urobilinogen 06/28/2021 0.2  0.2 - 1.0 EU/dL Final    Nitrites 06/28/2021 Negative  NEG   Final    Leukocyte Esterase 06/28/2021 Negative  NEG   Final    WBC 06/28/2021 0-4  0 - 4 /hpf Final    RBC 06/28/2021 0-5  0 - 5 /hpf Final    Epithelial cells 06/28/2021 FEW  FEW /lpf Final    Epithelial cell category consists of squamous cells and /or transitional urothelial cells. Renal tubular cells, if present, are separately identified as such.     Bacteria 06/28/2021 Negative  NEG /hpf Final    UA:UC IF INDICATED 06/28/2021 CULTURE NOT INDICATED BY UA RESULT  CNI   Final    Hyaline cast 06/28/2021 0-2  0 - 5 /lpf Final    Sodium 06/28/2021 137  136 - 145 mmol/L Final    Potassium 06/28/2021 4.0  3.5 - 5.1 mmol/L Final    Chloride 06/28/2021 103  97 - 108 mmol/L Final    CO2 06/28/2021 30  21 - 32 mmol/L Final    Anion gap 06/28/2021 4* 5 - 15 mmol/L Final    Glucose 06/28/2021 97  65 - 100 mg/dL Final    BUN 06/28/2021 16  6 - 20 MG/DL Final    Creatinine 06/28/2021 1.04  0.70 - 1.30 MG/DL Final    BUN/Creatinine ratio 06/28/2021 15  12 - 20   Final    GFR est AA 06/28/2021 >60  >60 ml/min/1.73m2 Final    GFR est non-AA 06/28/2021 >60  >60 ml/min/1.73m2 Final    Estimated GFR is calculated using the IDMS-traceable Modification of Diet in Renal Disease (MDRD) Study equation, reported for both  Americans (GFRAA) and non- Americans (GFRNA), and normalized to 1.73m2 body surface area. The physician must decide which value applies to the patient.  Calcium 06/28/2021 9.6  8.5 - 10.1 MG/DL Final    Bilirubin, total 06/28/2021 0.6  0.2 - 1.0 MG/DL Final    ALT (SGPT) 06/28/2021 24  12 - 78 U/L Final    AST (SGOT) 06/28/2021 16  15 - 37 U/L Final    Alk.  phosphatase 06/28/2021 104  45 - 117 U/L Final    Protein, total 06/28/2021 8.1  6.4 - 8.2 g/dL Final    Albumin 06/28/2021 3.7  3.5 - 5.0 g/dL Final    Globulin 06/28/2021 4.4* 2.0 - 4.0 g/dL Final    A-G Ratio 06/28/2021 0.8* 1.1 - 2.2   Final    Prealbumin 06/28/2021 31.0  20.0 - 40.0 mg/dL Final    Crossmatch Expiration 06/28/2021 07/09/2021,2359   Final    ABO/Rh(D) 06/28/2021 O NEGATIVE   Final    Antibody screen 06/28/2021 NEG   Final    Vitamin D 25-Hydroxy 06/28/2021 25.9* 30 - 100 ng/mL Final    Comment: (NOTE)  Deficiency               <20 ng/mL  Insufficiency          20-30 ng/mL  Sufficient             ng/mL  Possible toxicity       >100 ng/mL    The Method used is [employer] currently standardized to a   Center of Disease Control and Prevention (CDC) certified reference   method. Samples containing fluorescein dye can produce falsely   elevated values when tested with the [employer] Centaur Vitamin D Assay. It is recommended that results in the toxic range, >100 ng/mL, be   retested 72 hours post fluorescein exposure.  AMPHETAMINES 06/28/2021 Negative  NEG   Final    BARBITURATES 06/28/2021 Negative  NEG   Final    BENZODIAZEPINES 06/28/2021 Negative  NEG   Final    COCAINE 06/28/2021 Negative  NEG   Final    METHADONE 06/28/2021 Negative  NEG   Final    OPIATES 06/28/2021 Positive* NEG   Final    PCP(PHENCYCLIDINE) 06/28/2021 Negative  NEG   Final    THC (TH-CANNABINOL) 06/28/2021 Negative  NEG   Final    Drug screen comment 06/28/2021 (NOTE)   Final    Comment: This test is a screen for drugs of abuse in a medical setting only   (i.e., they are unconfirmed results and as such must not be used for   non-medical purposes e.g., employment testing, legal testing). Due to   its inherent nature, false positive (FP) and false negative (FN)   results may be obtained. Therefore, if necessary for medical care,   recommend confirmation of positive findings by GC/MS. The cutoff   values (i.e., the level at which this screening test becomes positive   for a given drug group) are:    Amphetamine/Methamphetamine: 300 ng/mL  Barbiturates:                200 ng/mL  Benzodiazepines:             200 ng/mL  Cocaine:                     150 ng/mL  Methadone:                   300 ng/mL  Opiates:                     300 ng/mL   Phencyclidine, PCP:           25 ng/mL  Marijuana, THC:               50 ng/mL    This screening test can identify the presence of the following drugs   when above the cutoff value; see list posted on the intranet. It can   be viewed by yumiko                           ecting in sequence the following from the [address] home   page: Jacqueline;  [facility], Resources; [facility], Physician Resources Q to Z; \"UDS (Urine Drug Screen   Automated) List of Detectable Drugs. \"     Or use web address:   [email or web address]/[place]/[facility]/Physician%20Resources/  UDS%20List%20of%20Detectable%20Drugs. pdf            Skin:   Denies open wounds, cuts, sores, rashes or other areas of concern in PAT assessment. Radha Duran NP     6/29/21 Results of CXR from PAT faxed to pulmonologist and surgeon for review/further recommendations prior to surgery. 7/1/21 Pt needs chest CT prior to surgery per Dr. Isaura العلي. Myron Jackson, assistant to Dr. Isaura العلي to contact pt and schedule CT.

## 2021-07-01 ENCOUNTER — TRANSCRIBE ORDER (OUTPATIENT)
Dept: SCHEDULING | Age: 61
End: 2021-07-01

## 2021-07-01 DIAGNOSIS — R93.89 ABNORMAL RADIOLOGICAL FINDINGS IN SKIN AND SUBCUTANEOUS TISSUE: Primary | ICD-10-CM

## 2021-07-02 NOTE — PERIOP NOTES
Left voice message for Infection Control Nurse to review patient's chart and see if patient can be de-flagged as last MRSA culture was negative.

## 2021-07-05 ENCOUNTER — APPOINTMENT (OUTPATIENT)
Dept: CT IMAGING | Age: 61
DRG: 321 | End: 2021-07-05
Attending: EMERGENCY MEDICINE
Payer: MEDICAID

## 2021-07-05 ENCOUNTER — APPOINTMENT (OUTPATIENT)
Dept: GENERAL RADIOLOGY | Age: 61
DRG: 321 | End: 2021-07-05
Attending: PHYSICIAN ASSISTANT
Payer: MEDICAID

## 2021-07-05 ENCOUNTER — APPOINTMENT (OUTPATIENT)
Dept: GENERAL RADIOLOGY | Age: 61
DRG: 321 | End: 2021-07-05
Attending: EMERGENCY MEDICINE
Payer: MEDICAID

## 2021-07-05 ENCOUNTER — HOSPITAL ENCOUNTER (INPATIENT)
Age: 61
LOS: 9 days | Discharge: HOME HEALTH | DRG: 321 | End: 2021-07-14
Attending: EMERGENCY MEDICINE | Admitting: ORTHOPAEDIC SURGERY
Payer: MEDICAID

## 2021-07-05 DIAGNOSIS — M48.061 SPINAL STENOSIS OF LUMBAR REGION, UNSPECIFIED WHETHER NEUROGENIC CLAUDICATION PRESENT: ICD-10-CM

## 2021-07-05 DIAGNOSIS — R07.89 CHEST TIGHTNESS: Primary | ICD-10-CM

## 2021-07-05 PROBLEM — M48.02 CERVICAL SPINAL STENOSIS: Status: ACTIVE | Noted: 2021-07-05

## 2021-07-05 LAB
ALBUMIN SERPL-MCNC: 3.6 G/DL (ref 3.5–5)
ALBUMIN/GLOB SERPL: 0.9 {RATIO} (ref 1.1–2.2)
ALP SERPL-CCNC: 100 U/L (ref 45–117)
ALT SERPL-CCNC: 23 U/L (ref 12–78)
ANION GAP SERPL CALC-SCNC: 4 MMOL/L (ref 5–15)
AST SERPL-CCNC: 15 U/L (ref 15–37)
BASOPHILS # BLD: 0.1 K/UL (ref 0–0.1)
BASOPHILS NFR BLD: 1 % (ref 0–1)
BILIRUB SERPL-MCNC: 0.4 MG/DL (ref 0.2–1)
BNP SERPL-MCNC: 45 PG/ML
BUN SERPL-MCNC: 20 MG/DL (ref 6–20)
BUN/CREAT SERPL: 17 (ref 12–20)
CALCIUM SERPL-MCNC: 9.3 MG/DL (ref 8.5–10.1)
CHLORIDE SERPL-SCNC: 102 MMOL/L (ref 97–108)
CK SERPL-CCNC: 88 U/L (ref 39–308)
CO2 SERPL-SCNC: 31 MMOL/L (ref 21–32)
CREAT SERPL-MCNC: 1.19 MG/DL (ref 0.7–1.3)
DIFFERENTIAL METHOD BLD: NORMAL
EOSINOPHIL # BLD: 0.3 K/UL (ref 0–0.4)
EOSINOPHIL NFR BLD: 6 % (ref 0–7)
ERYTHROCYTE [DISTWIDTH] IN BLOOD BY AUTOMATED COUNT: 14 % (ref 11.5–14.5)
GLOBULIN SER CALC-MCNC: 4.2 G/DL (ref 2–4)
GLUCOSE SERPL-MCNC: 90 MG/DL (ref 65–100)
HCT VFR BLD AUTO: 38.2 % (ref 36.6–50.3)
HGB BLD-MCNC: 12.1 G/DL (ref 12.1–17)
IMM GRANULOCYTES # BLD AUTO: 0 K/UL (ref 0–0.04)
IMM GRANULOCYTES NFR BLD AUTO: 0 % (ref 0–0.5)
LYMPHOCYTES # BLD: 0.8 K/UL (ref 0.8–3.5)
LYMPHOCYTES NFR BLD: 15 % (ref 12–49)
MCH RBC QN AUTO: 26.9 PG (ref 26–34)
MCHC RBC AUTO-ENTMCNC: 31.7 G/DL (ref 30–36.5)
MCV RBC AUTO: 85.1 FL (ref 80–99)
MONOCYTES # BLD: 0.6 K/UL (ref 0–1)
MONOCYTES NFR BLD: 11 % (ref 5–13)
NEUTS SEG # BLD: 3.3 K/UL (ref 1.8–8)
NEUTS SEG NFR BLD: 67 % (ref 32–75)
NRBC # BLD: 0 K/UL (ref 0–0.01)
NRBC BLD-RTO: 0 PER 100 WBC
PLATELET # BLD AUTO: 194 K/UL (ref 150–400)
PLATELET COMMENTS,PCOM: NORMAL
PMV BLD AUTO: 9 FL (ref 8.9–12.9)
POTASSIUM SERPL-SCNC: 4.2 MMOL/L (ref 3.5–5.1)
PROT SERPL-MCNC: 7.8 G/DL (ref 6.4–8.2)
RBC # BLD AUTO: 4.49 M/UL (ref 4.1–5.7)
RBC MORPH BLD: NORMAL
SODIUM SERPL-SCNC: 137 MMOL/L (ref 136–145)
TROPONIN I SERPL-MCNC: <0.05 NG/ML
WBC # BLD AUTO: 5.1 K/UL (ref 4.1–11.1)
WBC MORPH BLD: NORMAL

## 2021-07-05 PROCEDURE — 74011250637 HC RX REV CODE- 250/637: Performed by: PHYSICIAN ASSISTANT

## 2021-07-05 PROCEDURE — 65270000029 HC RM PRIVATE

## 2021-07-05 PROCEDURE — 74011250636 HC RX REV CODE- 250/636: Performed by: EMERGENCY MEDICINE

## 2021-07-05 PROCEDURE — 93005 ELECTROCARDIOGRAM TRACING: CPT

## 2021-07-05 PROCEDURE — 2709999900 HC NON-CHARGEABLE SUPPLY

## 2021-07-05 PROCEDURE — 94762 N-INVAS EAR/PLS OXIMTRY CONT: CPT

## 2021-07-05 PROCEDURE — 99285 EMERGENCY DEPT VISIT HI MDM: CPT

## 2021-07-05 PROCEDURE — 74011250637 HC RX REV CODE- 250/637: Performed by: ORTHOPAEDIC SURGERY

## 2021-07-05 PROCEDURE — 74011000636 HC RX REV CODE- 636: Performed by: EMERGENCY MEDICINE

## 2021-07-05 PROCEDURE — 83880 ASSAY OF NATRIURETIC PEPTIDE: CPT

## 2021-07-05 PROCEDURE — 94640 AIRWAY INHALATION TREATMENT: CPT

## 2021-07-05 PROCEDURE — 36415 COLL VENOUS BLD VENIPUNCTURE: CPT

## 2021-07-05 PROCEDURE — 84484 ASSAY OF TROPONIN QUANT: CPT

## 2021-07-05 PROCEDURE — 71045 X-RAY EXAM CHEST 1 VIEW: CPT

## 2021-07-05 PROCEDURE — 74011250637 HC RX REV CODE- 250/637: Performed by: EMERGENCY MEDICINE

## 2021-07-05 PROCEDURE — 85025 COMPLETE CBC W/AUTO DIFF WBC: CPT

## 2021-07-05 PROCEDURE — 80053 COMPREHEN METABOLIC PANEL: CPT

## 2021-07-05 PROCEDURE — 74011250636 HC RX REV CODE- 250/636: Performed by: PHYSICIAN ASSISTANT

## 2021-07-05 PROCEDURE — 74011000250 HC RX REV CODE- 250: Performed by: PHYSICIAN ASSISTANT

## 2021-07-05 PROCEDURE — 71260 CT THORAX DX C+: CPT

## 2021-07-05 PROCEDURE — 82550 ASSAY OF CK (CPK): CPT

## 2021-07-05 RX ORDER — AMOXICILLIN 250 MG
2 CAPSULE ORAL 2 TIMES DAILY
Status: DISCONTINUED | OUTPATIENT
Start: 2021-07-05 | End: 2021-07-07

## 2021-07-05 RX ORDER — RISPERIDONE 1 MG/1
0.5 TABLET, FILM COATED ORAL 3 TIMES DAILY
Status: DISCONTINUED | OUTPATIENT
Start: 2021-07-05 | End: 2021-07-06

## 2021-07-05 RX ORDER — METOPROLOL SUCCINATE 25 MG/1
25 TABLET, EXTENDED RELEASE ORAL DAILY
Status: DISCONTINUED | OUTPATIENT
Start: 2021-07-06 | End: 2021-07-06

## 2021-07-05 RX ORDER — GABAPENTIN 800 MG/1
800 TABLET ORAL 3 TIMES DAILY
Status: DISCONTINUED | OUTPATIENT
Start: 2021-07-05 | End: 2021-07-05

## 2021-07-05 RX ORDER — GABAPENTIN 100 MG/1
200 CAPSULE ORAL 3 TIMES DAILY
Status: DISCONTINUED | OUTPATIENT
Start: 2021-07-05 | End: 2021-07-06

## 2021-07-05 RX ORDER — FLUTICASONE PROPIONATE 50 MCG
2 SPRAY, SUSPENSION (ML) NASAL DAILY
Status: DISCONTINUED | OUTPATIENT
Start: 2021-07-06 | End: 2021-07-06

## 2021-07-05 RX ORDER — ONDANSETRON 4 MG/1
4 TABLET, ORALLY DISINTEGRATING ORAL
Status: DISCONTINUED | OUTPATIENT
Start: 2021-07-05 | End: 2021-07-06

## 2021-07-05 RX ORDER — DULOXETIN HYDROCHLORIDE 30 MG/1
120 CAPSULE, DELAYED RELEASE ORAL DAILY
Status: DISCONTINUED | OUTPATIENT
Start: 2021-07-06 | End: 2021-07-06

## 2021-07-05 RX ORDER — OXYCODONE HYDROCHLORIDE 5 MG/1
5 TABLET ORAL
Status: DISPENSED | OUTPATIENT
Start: 2021-07-05 | End: 2021-07-06

## 2021-07-05 RX ORDER — CYCLOBENZAPRINE HCL 10 MG
10 TABLET ORAL DAILY
Status: DISCONTINUED | OUTPATIENT
Start: 2021-07-06 | End: 2021-07-06

## 2021-07-05 RX ORDER — BUSPIRONE HYDROCHLORIDE 5 MG/1
15 TABLET ORAL 3 TIMES DAILY
Status: DISCONTINUED | OUTPATIENT
Start: 2021-07-05 | End: 2021-07-06

## 2021-07-05 RX ORDER — BACLOFEN 10 MG/1
10 TABLET ORAL 3 TIMES DAILY
Status: DISCONTINUED | OUTPATIENT
Start: 2021-07-05 | End: 2021-07-06

## 2021-07-05 RX ORDER — DIAZEPAM 5 MG/1
10 TABLET ORAL
Status: DISCONTINUED | OUTPATIENT
Start: 2021-07-05 | End: 2021-07-06

## 2021-07-05 RX ORDER — MONTELUKAST SODIUM 10 MG/1
10 TABLET ORAL
Status: DISCONTINUED | OUTPATIENT
Start: 2021-07-05 | End: 2021-07-06

## 2021-07-05 RX ORDER — TRAZODONE HYDROCHLORIDE 100 MG/1
100 TABLET ORAL
Status: DISCONTINUED | OUTPATIENT
Start: 2021-07-05 | End: 2021-07-06

## 2021-07-05 RX ORDER — ACETAMINOPHEN 325 MG/1
650 TABLET ORAL EVERY 6 HOURS
Status: DISCONTINUED | OUTPATIENT
Start: 2021-07-05 | End: 2021-07-07

## 2021-07-05 RX ORDER — GABAPENTIN 300 MG/1
600 CAPSULE ORAL 3 TIMES DAILY
Status: DISCONTINUED | OUTPATIENT
Start: 2021-07-05 | End: 2021-07-06

## 2021-07-05 RX ORDER — ONDANSETRON 4 MG/1
4 TABLET, FILM COATED ORAL
Status: DISCONTINUED | OUTPATIENT
Start: 2021-07-05 | End: 2021-07-05

## 2021-07-05 RX ORDER — SODIUM CHLORIDE 9 MG/ML
75 INJECTION, SOLUTION INTRAVENOUS CONTINUOUS
Status: DISCONTINUED | OUTPATIENT
Start: 2021-07-05 | End: 2021-07-07

## 2021-07-05 RX ORDER — OXYCODONE HYDROCHLORIDE 5 MG/1
5 TABLET ORAL
Status: DISCONTINUED | OUTPATIENT
Start: 2021-07-05 | End: 2021-07-07

## 2021-07-05 RX ORDER — SODIUM CHLORIDE 0.9 % (FLUSH) 0.9 %
5-40 SYRINGE (ML) INJECTION AS NEEDED
Status: DISCONTINUED | OUTPATIENT
Start: 2021-07-05 | End: 2021-07-12 | Stop reason: SDUPTHER

## 2021-07-05 RX ORDER — IPRATROPIUM BROMIDE AND ALBUTEROL SULFATE 2.5; .5 MG/3ML; MG/3ML
3 SOLUTION RESPIRATORY (INHALATION)
Status: DISCONTINUED | OUTPATIENT
Start: 2021-07-05 | End: 2021-07-06

## 2021-07-05 RX ORDER — ATORVASTATIN CALCIUM 40 MG/1
40 TABLET, FILM COATED ORAL
Status: DISCONTINUED | OUTPATIENT
Start: 2021-07-05 | End: 2021-07-06

## 2021-07-05 RX ORDER — OXYCODONE HYDROCHLORIDE 5 MG/1
2.5 TABLET ORAL
Status: DISCONTINUED | OUTPATIENT
Start: 2021-07-05 | End: 2021-07-07

## 2021-07-05 RX ORDER — IPRATROPIUM BROMIDE 42 UG/1
1 SPRAY, METERED NASAL 3 TIMES DAILY
Status: DISCONTINUED | OUTPATIENT
Start: 2021-07-05 | End: 2021-07-06

## 2021-07-05 RX ORDER — HYDROCODONE BITARTRATE AND ACETAMINOPHEN 5; 325 MG/1; MG/1
1 TABLET ORAL
Status: COMPLETED | OUTPATIENT
Start: 2021-07-05 | End: 2021-07-05

## 2021-07-05 RX ORDER — SODIUM CHLORIDE 0.9 % (FLUSH) 0.9 %
5-40 SYRINGE (ML) INJECTION EVERY 8 HOURS
Status: DISCONTINUED | OUTPATIENT
Start: 2021-07-05 | End: 2021-07-12 | Stop reason: SDUPTHER

## 2021-07-05 RX ORDER — NALTREXONE HYDROCHLORIDE 50 MG/1
50 TABLET, FILM COATED ORAL DAILY
Status: DISCONTINUED | OUTPATIENT
Start: 2021-07-06 | End: 2021-07-06

## 2021-07-05 RX ORDER — NALOXONE HYDROCHLORIDE 0.4 MG/ML
0.4 INJECTION, SOLUTION INTRAMUSCULAR; INTRAVENOUS; SUBCUTANEOUS AS NEEDED
Status: DISCONTINUED | OUTPATIENT
Start: 2021-07-05 | End: 2021-07-14 | Stop reason: HOSPADM

## 2021-07-05 RX ADMIN — GABAPENTIN 200 MG: 100 CAPSULE ORAL at 22:44

## 2021-07-05 RX ADMIN — SODIUM CHLORIDE 1000 ML: 9 INJECTION, SOLUTION INTRAVENOUS at 22:44

## 2021-07-05 RX ADMIN — RISPERIDONE 0.5 MG: 1 TABLET ORAL at 22:44

## 2021-07-05 RX ADMIN — IOPAMIDOL 100 ML: 755 INJECTION, SOLUTION INTRAVENOUS at 19:40

## 2021-07-05 RX ADMIN — BUSPIRONE HYDROCHLORIDE 15 MG: 5 TABLET ORAL at 22:43

## 2021-07-05 RX ADMIN — ACETAMINOPHEN 650 MG: 325 TABLET ORAL at 22:44

## 2021-07-05 RX ADMIN — Medication 10 ML: at 22:45

## 2021-07-05 RX ADMIN — MONTELUKAST 10 MG: 10 TABLET, FILM COATED ORAL at 22:44

## 2021-07-05 RX ADMIN — HYDROCODONE BITARTRATE AND ACETAMINOPHEN 1 TABLET: 5; 325 TABLET ORAL at 18:48

## 2021-07-05 RX ADMIN — DOCUSATE SODIUM 50MG AND SENNOSIDES 8.6MG 2 TABLET: 8.6; 5 TABLET, FILM COATED ORAL at 22:43

## 2021-07-05 RX ADMIN — SODIUM CHLORIDE 75 ML/HR: 9 INJECTION, SOLUTION INTRAVENOUS at 22:55

## 2021-07-05 RX ADMIN — GABAPENTIN 600 MG: 300 CAPSULE ORAL at 22:43

## 2021-07-05 RX ADMIN — ARFORMOTEROL TARTRATE: 15 SOLUTION RESPIRATORY (INHALATION) at 21:20

## 2021-07-05 RX ADMIN — OXYCODONE 5 MG: 5 TABLET ORAL at 22:43

## 2021-07-05 RX ADMIN — ATORVASTATIN CALCIUM 40 MG: 40 TABLET, FILM COATED ORAL at 22:44

## 2021-07-05 NOTE — ED TRIAGE NOTES
Pt states he was sent here for an emergent CT scan of his chest as something was seen during his preadmit workup.  Pt c/o chest tightness/pain substernal pt states he didn't have pain til they told him something showed up; \" felt like a knot there and it has since faded away; ,denied, increased shortness of breath (any more than usual), fever,  n/v

## 2021-07-05 NOTE — ED PROVIDER NOTES
EMERGENCY DEPARTMENT HISTORY AND PHYSICAL EXAM      Date: 7/5/2021  Patient Name: Jenn Postal    History of Presenting Illness     Chief Complaint   Patient presents with   Micheline Villalobos Referral / Consult     Sent by Dr. Bola Armas office, was there for pre-op appt today for neck surgery, MD office concerned for PE, needs CT         HPI: Jenn Postal, 62 y.o. male who is scheduled for spine surgery tomorrow with Dr. Carolina Brothers sent in to ED for follow-up after abnormal x-ray. He had a chest x-ray preoperatively and it noted lesions. He was sent in for further characterization. Patient notes he has some central chest discomfort on and off. He states mostly he has had this since he found out he had abnormal x-ray results. Otherwise, he complains of right-sided neck pain and that is the reason he is having surgery tomorrow. There are no other complaints, changes, or physical findings at this time. PCP: Matias Porter MD    No current facility-administered medications on file prior to encounter. Current Outpatient Medications on File Prior to Encounter   Medication Sig Dispense Refill    metoprolol succinate (TOPROL-XL) 25 mg XL tablet Take 25 mg by mouth daily.  hydrOXYzine HCL (ATARAX) 25 mg tablet Take  by mouth three (3) times daily as needed.  traZODone (DESYREL) 100 mg tablet Take 100 mg by mouth nightly. Take 2-3 tablets      diclofenac potassium (CATAFLAM) 50 mg tablet Take 50 mg by mouth two (2) times daily as needed.  DULoxetine (CYMBALTA) 60 mg capsule Take 120 mg by mouth daily.  risperiDONE (RisperDAL) 1 mg tablet Take 1 mg by mouth three (3) times daily.  cyanocobalamin (Vitamin B-12) 1,000 mcg tablet Take 1,000 mcg by mouth daily.  oxyCODONE IR (ROXICODONE) 5 mg immediate release tablet Take 7.5 mg by mouth three (3) times daily as needed for Pain.       acetaminophen (TYLENOL) 500 mg tablet TAKE 2 TABLETS BY MOUTH EVERY 6 HOURS AS NEEDED FOR MILD PAIN OR MODERATE PAIN FOR UP TO 10 DAYS      Ear Drops 6.5 % otic solution INSTILL 5 DROPS INTO BOTH EARS 2 TIMES PER DAY FOR 5 DAYS      cyclobenzaprine (FLEXERIL) 10 mg tablet Take 10 mg by mouth daily.  diazePAM (VALIUM) 10 mg tablet Take 10 mg by mouth.  ergocalciferol (ERGOCALCIFEROL) 1,250 mcg (50,000 unit) capsule TAKE 1 CAPSULE BY ORAL ROUTE WEEKLY FOR 90 DAYS      fluticasone propionate (FLONASE) 50 mcg/actuation nasal spray INSTILL 2 SPRAYS IN THE NOSTRILS DAILY      Mucinex 600 mg ER tablet 1,200 mg two (2) times a day.  hydrOXYzine pamoate (VISTARIL) 50 mg capsule Take 50 mg by mouth.  ipratropium (ATROVENT) 42 mcg (0.06 %) nasal spray 1 [place] by Nasal route three (3) times daily. 1-2 squirts in the nostrils      senna (Senexon) 8.6 mg tablet Take 2 Tabs by mouth two (2) times a day.  ferrous sulfate 325 mg (65 mg iron) tablet Take  by mouth Daily (before breakfast).  montelukast (SINGULAIR) 10 mg tablet Take 10 mg by mouth nightly.  pyridoxine, vitamin B6, (VITAMIN B-6) 50 mg tablet Take 50 mg by mouth two (2) times a day.  gabapentin (NEURONTIN) 800 mg tablet Take 800 mg by mouth three (3) times daily.  ondansetron hcl (ZOFRAN) 4 mg tablet Take 4 mg by mouth every six (6) hours as needed for Nausea.  albuterol-ipratropium (DUO-NEB) 2.5 mg-0.5 mg/3 ml nebu 3 mL by Nebulization route every six (6) hours as needed.  Oxygen Indications: 3 LITERS NC AT BEDTIME      atorvastatin (LIPITOR) 40 mg tablet Take 40 mg by mouth nightly.  baclofen (LIORESAL) 10 mg tablet Take 10 mg by mouth three (3) times daily.  budesonide-formoterol (SYMBICORT) 80-4.5 mcg/actuation HFAA Take 2 Puffs by inhalation two (2) times a day. Indications: BRONCHOSPASM PREVENTION WITH COPD 2 Inhaler 2    busPIRone (BUSPAR) 15 mg tablet Take 1 Tab by mouth three (3) times daily. Indications: Generalized Anxiety Disorder (Patient taking differently: Take 30 mg by mouth two (2) times a day. Indications: repeated episodes of anxiety) 90 Tab 2    levETIRAcetam (KEPPRA) 1,000 mg tablet Take 1 Tab by mouth two (2) times a day. Indications: PARTIAL EPILEPSY TREATMENT ADJUNCT 60 Tab 2    naltrexone (DEPADE) 50 mg tablet Take 1 Tab by mouth daily. Indications: alcoholism 30 Tab 2    pantoprazole (PROTONIX) 40 mg tablet Take 1 Tab by mouth Before breakfast and dinner. (Patient taking differently: Take 40 mg by mouth daily.) 60 Tab 2    tiotropium (SPIRIVA) 18 mcg inhalation capsule Take 1 Cap by inhalation daily. (Patient taking differently: Take 1 Capsule by inhalation two (2) times a day.) 30 Cap 2       Past History     Past Medical History:  Past Medical History:   Diagnosis Date    Anxiety     Arthritis     Bullous emphysema (HCC)     Chronic obstructive pulmonary disease (HCC)     O2 2L sitting - 3L walking -- 24hr/day    Chronic pain     BACK AND LEGS    Elevated hemoglobin A1c 9/25/2019    GERD (gastroesophageal reflux disease)     H/O cardiovascular stress test 06/03/2019    6/3/19 \"Negative for ischemia. Preserved LV function. EF 65%. No wall motion abnormalities. \" per Dr Chantal Caraballo notes 6/3/19.    H/O ETOH abuse     Hypertension     MRSA carrier 09/26/2019    On home oxygen therapy     uses 3L with activity    Opioid dependence ([address]     per PCP clearance 2020    Pneumothorax 2009 & 2010    right & left    Psychiatric disorder     DEPRESSION    Seizures ([address] 2014    Sleep apnea 2020    not currently using CPAP     Stroke ([address] 2015    \"mini-stroke'       Past Surgical History:  Past Surgical History:   Procedure Laterality Date    HX HERNIA REPAIR Right 2015    HX HERNIA REPAIR Left 2015    HX OTHER SURGICAL  2009 & 2010    CHEST TUBE WITH COLLAPSED LUNGS    HX ROTATOR CUFF REPAIR Right 2016    HX ROTATOR CUFF REPAIR Left 2016       Family History:  Family History   Problem Relation Age of Onset    Hypertension Mother     Cancer Mother         BRAIN    Heart Attack Mother     Heart Attack Father     Hypertension Father     Hypertension Sister     Asthma Sister     Cancer Brother         COLON    Hypertension Brother     Anesth Problems Neg Hx        Social History:  Social History     Tobacco Use    Smoking status: Former Smoker     Packs/day: 1.00     Years: 30.00     Pack years: 30.00     Quit date:      Years since quittin.5    Smokeless tobacco: Never Used   Vaping Use    Vaping Use: Never used   Substance Use Topics    Alcohol use: Yes     Comment: \"a beer every other day, liquor on special occassions\"    Drug use: Yes     Types: Marijuana     Comment: monthly       Allergies: Allergies   Allergen Reactions    Chantix [Varenicline] Seizures         Review of Systems   Review of Systems   Constitutional: Negative for chills and fever. HENT: Negative for sore throat. Eyes: Negative for redness. Respiratory: Negative for shortness of breath. Cardiovascular: Positive for chest pain. Gastrointestinal: Negative for abdominal pain. Genitourinary: Negative for dysuria. Musculoskeletal: Positive for neck pain. Negative for back pain. Neurological: Negative for syncope. Psychiatric/Behavioral: The patient is not nervous/anxious. All other systems reviewed and are negative. Physical Exam   Physical Exam  Vitals and nursing note reviewed. Constitutional:       Appearance: Normal appearance. HENT:      Head: Normocephalic and atraumatic. Mouth/Throat:      Mouth: Mucous membranes are moist.   Cardiovascular:      Rate and Rhythm: Normal rate and regular rhythm. Pulmonary:      Effort: Pulmonary effort is normal.      Breath sounds: Normal breath sounds. Abdominal:      Palpations: Abdomen is soft. Tenderness: There is no abdominal tenderness. Musculoskeletal:         General: No deformity. Cervical back: Neck supple. Skin:     General: Skin is warm and dry.    Neurological:      General: No focal deficit present. Mental Status: He is alert. Psychiatric:         Mood and Affect: Mood normal.         Behavior: Behavior normal.         Diagnostic Study Results     Labs -     Recent Results (from the past 24 hour(s))   EKG, 12 LEAD, INITIAL    Collection Time: 07/05/21  2:51 PM   Result Value Ref Range    Ventricular Rate 84 BPM    Atrial Rate 84 BPM    P-R Interval 162 ms    QRS Duration 86 ms    Q-T Interval 378 ms    QTC Calculation (Bezet) 446 ms    Calculated P Axis 55 degrees    Calculated R Axis 27 degrees    Calculated T Axis 41 degrees    Diagnosis       Normal sinus rhythm  Normal ECG  When compared with ECG of 28-JUN-2021 14:51,  No significant change was found     CBC WITH AUTOMATED DIFF    Collection Time: 07/05/21  4:51 PM   Result Value Ref Range    WBC 5.1 4.1 - 11.1 K/uL    RBC 4.49 4. 10 - 5.70 M/uL    HGB 12.1 12.1 - 17.0 g/dL    HCT 38.2 36.6 - 50.3 %    MCV 85.1 80.0 - 99.0 FL    MCH 26.9 26.0 - 34.0 PG    MCHC 31.7 30.0 - 36.5 g/dL    RDW 14.0 11.5 - 14.5 %    PLATELET 106 273 - 614 K/uL    MPV 9.0 8.9 - 12.9 FL    NRBC 0.0 0  WBC    ABSOLUTE NRBC 0.00 0.00 - 0.01 K/uL    NEUTROPHILS 67 32 - 75 %    LYMPHOCYTES 15 12 - 49 %    MONOCYTES 11 5 - 13 %    EOSINOPHILS 6 0 - 7 %    BASOPHILS 1 0 - 1 %    IMMATURE GRANULOCYTES 0 0.0 - 0.5 %    ABS. NEUTROPHILS 3.3 1.8 - 8.0 K/UL    ABS. LYMPHOCYTES 0.8 0.8 - 3.5 K/UL    ABS. MONOCYTES 0.6 0.0 - 1.0 K/UL    ABS. EOSINOPHILS 0.3 0.0 - 0.4 K/UL    ABS. BASOPHILS 0.1 0.0 - 0.1 K/UL    ABS. IMM.  GRANS. 0.0 0.00 - 0.04 K/UL    DF SMEAR SCANNED      PLATELET COMMENTS Large Platelets      RBC COMMENTS NORMOCYTIC, NORMOCHROMIC      WBC COMMENTS ATYPICAL LYMPHOCYTES PRESENT     METABOLIC PANEL, COMPREHENSIVE    Collection Time: 07/05/21  4:51 PM   Result Value Ref Range    Sodium 137 136 - 145 mmol/L    Potassium 4.2 3.5 - 5.1 mmol/L    Chloride 102 97 - 108 mmol/L    CO2 31 21 - 32 mmol/L    Anion gap 4 (L) 5 - 15 mmol/L    Glucose 90 65 - 100 mg/dL BUN 20 6 - 20 MG/DL    Creatinine 1.19 0.70 - 1.30 MG/DL    BUN/Creatinine ratio 17 12 - 20      GFR est AA >60 >60 ml/min/1.73m2    GFR est non-AA >60 >60 ml/min/1.73m2    Calcium 9.3 8.5 - 10.1 MG/DL    Bilirubin, total 0.4 0.2 - 1.0 MG/DL    ALT (SGPT) 23 12 - 78 U/L    AST (SGOT) 15 15 - 37 U/L    Alk. phosphatase 100 45 - 117 U/L    Protein, total 7.8 6.4 - 8.2 g/dL    Albumin 3.6 3.5 - 5.0 g/dL    Globulin 4.2 (H) 2.0 - 4.0 g/dL    A-G Ratio 0.9 (L) 1.1 - 2.2     CK W/ REFLX CKMB    Collection Time: 07/05/21  4:51 PM   Result Value Ref Range    CK 88 39 - 308 U/L   TROPONIN I    Collection Time: 07/05/21  4:51 PM   Result Value Ref Range    Troponin-I, Qt. <0.05 <0.05 ng/mL   NT-PRO BNP    Collection Time: 07/05/21  4:51 PM   Result Value Ref Range    NT pro-BNP 45 <125 PG/ML       Radiologic Studies -   XR CHEST PORT   Final Result   No significant interval change. CT CHEST W CONT    (Results Pending)     CT Results  (Last 48 hours)    None        CXR Results  (Last 48 hours)               07/05/21 1610  XR CHEST PORT Final result    Impression:  No significant interval change. Narrative:  EXAM: XR CHEST PORT       HISTORY: Shortness of breath. COMPARISON: 6/28/2021       FINDINGS: Single view(s) of the chest. The lungs are well inflated. There is   unchanged irregularity in the interstitium. No focal consolidation. There is   unchanged scarring in the left lateral costophrenic angle. No pneumothorax. The   patient is status post bilateral shoulder arthroplasties. Medical Decision Making   I am the first provider for this patient. I reviewed the vital signs, available nursing notes, past medical history, past surgical history, family history and social history. Vital Signs-Reviewed the patient's vital signs.   Patient Vitals for the past 24 hrs:   Temp Pulse Resp BP SpO2   07/05/21 1847  87 17  92 %   07/05/21 1830  84 11 (!) 149/97 93 %   07/05/21 1810  82 10 129/77 95 %   07/05/21 1800  90 23 (!) 140/87 92 %   07/05/21 1745  83 11 135/87 94 %   07/05/21 1730  83 15 134/80 92 %   07/05/21 1715  82 12 (!) 130/94 97 %   07/05/21 1700  83 12 138/82 93 %   07/05/21 1658     96 %   07/05/21 1645  85 16 127/79 95 %   07/05/21 1630  86 19 137/88 94 %   07/05/21 1439 97 °F (36.1 °C) 92 18 135/80 93 %         Provider Notes (Medical Decision Making):   Spoke to DAPHNE Ruiz who will admit patient to orthopedic service. EKG sinus, rate normal, nonspecific ST T changes, normal QT    ED Course:     Initial assessment performed. The patients presenting problems have been discussed, and they are in agreement with the care plan formulated and outlined with them. I have encouraged them to ask questions as they arise throughout their visit. Disposition:  admit  PLAN:  1. Current Discharge Medication List        2.    Follow-up Information    None       Return to ED if worse     Diagnosis     Clinical Impression: chest tightness, neck pain

## 2021-07-06 ENCOUNTER — ANESTHESIA EVENT (OUTPATIENT)
Dept: SURGERY | Age: 61
DRG: 321 | End: 2021-07-06
Payer: MEDICAID

## 2021-07-06 ENCOUNTER — APPOINTMENT (OUTPATIENT)
Dept: GENERAL RADIOLOGY | Age: 61
DRG: 321 | End: 2021-07-06
Attending: ORTHOPAEDIC SURGERY
Payer: MEDICAID

## 2021-07-06 ENCOUNTER — ANESTHESIA (OUTPATIENT)
Dept: SURGERY | Age: 61
DRG: 321 | End: 2021-07-06
Payer: MEDICAID

## 2021-07-06 LAB
ALBUMIN SERPL-MCNC: 3 G/DL (ref 3.5–5)
ALBUMIN/GLOB SERPL: 0.8 {RATIO} (ref 1.1–2.2)
ALP SERPL-CCNC: 85 U/L (ref 45–117)
ALT SERPL-CCNC: 20 U/L (ref 12–78)
ANION GAP SERPL CALC-SCNC: 3 MMOL/L (ref 5–15)
APPEARANCE UR: CLEAR
AST SERPL-CCNC: 13 U/L (ref 15–37)
ATRIAL RATE: 84 BPM
BACTERIA URNS QL MICRO: NEGATIVE /HPF
BILIRUB SERPL-MCNC: 0.4 MG/DL (ref 0.2–1)
BILIRUB UR QL: NEGATIVE
BUN SERPL-MCNC: 18 MG/DL (ref 6–20)
BUN/CREAT SERPL: 18 (ref 12–20)
CALCIUM SERPL-MCNC: 8.9 MG/DL (ref 8.5–10.1)
CALCULATED P AXIS, ECG09: 55 DEGREES
CALCULATED R AXIS, ECG10: 27 DEGREES
CALCULATED T AXIS, ECG11: 41 DEGREES
CHLORIDE SERPL-SCNC: 108 MMOL/L (ref 97–108)
CO2 SERPL-SCNC: 29 MMOL/L (ref 21–32)
COLOR UR: NORMAL
CREAT SERPL-MCNC: 1 MG/DL (ref 0.7–1.3)
DIAGNOSIS, 93000: NORMAL
EPITH CASTS URNS QL MICRO: NORMAL /LPF
GLOBULIN SER CALC-MCNC: 3.7 G/DL (ref 2–4)
GLUCOSE SERPL-MCNC: 76 MG/DL (ref 65–100)
GLUCOSE UR STRIP.AUTO-MCNC: NEGATIVE MG/DL
HGB UR QL STRIP: NEGATIVE
INR PPP: 1 (ref 0.9–1.1)
KETONES UR QL STRIP.AUTO: NEGATIVE MG/DL
LEUKOCYTE ESTERASE UR QL STRIP.AUTO: NEGATIVE
NITRITE UR QL STRIP.AUTO: NEGATIVE
P-R INTERVAL, ECG05: 162 MS
PH UR STRIP: 6 [PH] (ref 5–8)
POTASSIUM SERPL-SCNC: 4.1 MMOL/L (ref 3.5–5.1)
PROT SERPL-MCNC: 6.7 G/DL (ref 6.4–8.2)
PROT UR STRIP-MCNC: NEGATIVE MG/DL
PROTHROMBIN TIME: 10.2 SEC (ref 9–11.1)
Q-T INTERVAL, ECG07: 378 MS
QRS DURATION, ECG06: 86 MS
QTC CALCULATION (BEZET), ECG08: 446 MS
RBC #/AREA URNS HPF: NORMAL /HPF (ref 0–5)
SODIUM SERPL-SCNC: 140 MMOL/L (ref 136–145)
SP GR UR REFRACTOMETRY: 1.02 (ref 1–1.03)
UA: UC IF INDICATED,UAUC: NORMAL
UROBILINOGEN UR QL STRIP.AUTO: 0.2 EU/DL (ref 0.2–1)
VENTRICULAR RATE, ECG03: 84 BPM
WBC URNS QL MICRO: NORMAL /HPF (ref 0–4)

## 2021-07-06 PROCEDURE — 94760 N-INVAS EAR/PLS OXIMETRY 1: CPT

## 2021-07-06 PROCEDURE — 74011000250 HC RX REV CODE- 250: Performed by: ORTHOPAEDIC SURGERY

## 2021-07-06 PROCEDURE — 0RB30ZZ EXCISION OF CERVICAL VERTEBRAL DISC, OPEN APPROACH: ICD-10-PCS | Performed by: ORTHOPAEDIC SURGERY

## 2021-07-06 PROCEDURE — 77030020268 HC MISC GENERAL SUPPLY: Performed by: ORTHOPAEDIC SURGERY

## 2021-07-06 PROCEDURE — 74011250636 HC RX REV CODE- 250/636: Performed by: ORTHOPAEDIC SURGERY

## 2021-07-06 PROCEDURE — 77030033138 HC SUT PGA STRATFX J&J -B: Performed by: ORTHOPAEDIC SURGERY

## 2021-07-06 PROCEDURE — 74011250637 HC RX REV CODE- 250/637: Performed by: NURSE PRACTITIONER

## 2021-07-06 PROCEDURE — 2709999900 HC NON-CHARGEABLE SUPPLY: Performed by: ORTHOPAEDIC SURGERY

## 2021-07-06 PROCEDURE — 76010000172 HC OR TIME 2.5 TO 3 HR INTENSV-TIER 1: Performed by: ORTHOPAEDIC SURGERY

## 2021-07-06 PROCEDURE — 74011000250 HC RX REV CODE- 250: Performed by: NURSE ANESTHETIST, CERTIFIED REGISTERED

## 2021-07-06 PROCEDURE — C1713 ANCHOR/SCREW BN/BN,TIS/BN: HCPCS | Performed by: ORTHOPAEDIC SURGERY

## 2021-07-06 PROCEDURE — 77030008684 HC TU ET CUF COVD -B: Performed by: ANESTHESIOLOGY

## 2021-07-06 PROCEDURE — 76210000000 HC OR PH I REC 2 TO 2.5 HR: Performed by: ORTHOPAEDIC SURGERY

## 2021-07-06 PROCEDURE — 85610 PROTHROMBIN TIME: CPT

## 2021-07-06 PROCEDURE — 77030034479 HC ADH SKN CLSR PRINEO J&J -B: Performed by: ORTHOPAEDIC SURGERY

## 2021-07-06 PROCEDURE — 77030002996 HC SUT SLK J&J -A: Performed by: ORTHOPAEDIC SURGERY

## 2021-07-06 PROCEDURE — 77030038692 HC WND DEB SYS IRMX -B: Performed by: ORTHOPAEDIC SURGERY

## 2021-07-06 PROCEDURE — 77030026438 HC STYL ET INTUB CARD -A: Performed by: ANESTHESIOLOGY

## 2021-07-06 PROCEDURE — 77030004402 HC BUR NEUR STRY -C: Performed by: ORTHOPAEDIC SURGERY

## 2021-07-06 PROCEDURE — 77030008462 HC STPLR SKN PROX J&J -A: Performed by: ORTHOPAEDIC SURGERY

## 2021-07-06 PROCEDURE — 2709999900 HC NON-CHARGEABLE SUPPLY

## 2021-07-06 PROCEDURE — C1889 IMPLANT/INSERT DEVICE, NOC: HCPCS | Performed by: ORTHOPAEDIC SURGERY

## 2021-07-06 PROCEDURE — 77030003029 HC SUT VCRL J&J -B: Performed by: ORTHOPAEDIC SURGERY

## 2021-07-06 PROCEDURE — 94640 AIRWAY INHALATION TREATMENT: CPT

## 2021-07-06 PROCEDURE — 74011250637 HC RX REV CODE- 250/637: Performed by: PHYSICIAN ASSISTANT

## 2021-07-06 PROCEDURE — 74011250636 HC RX REV CODE- 250/636: Performed by: ANESTHESIOLOGY

## 2021-07-06 PROCEDURE — 77030034475 HC MISC IMPL SPN: Performed by: ORTHOPAEDIC SURGERY

## 2021-07-06 PROCEDURE — 65270000029 HC RM PRIVATE

## 2021-07-06 PROCEDURE — 74011000250 HC RX REV CODE- 250: Performed by: PHYSICIAN ASSISTANT

## 2021-07-06 PROCEDURE — 76000 FLUOROSCOPY <1 HR PHYS/QHP: CPT

## 2021-07-06 PROCEDURE — 74011250637 HC RX REV CODE- 250/637: Performed by: INTERNAL MEDICINE

## 2021-07-06 PROCEDURE — 74011250637 HC RX REV CODE- 250/637: Performed by: ORTHOPAEDIC SURGERY

## 2021-07-06 PROCEDURE — 77030011266 HC ELECTRD BLD INSL COVD -A: Performed by: ORTHOPAEDIC SURGERY

## 2021-07-06 PROCEDURE — 77030003028 HC SUT VCRL J&J -A: Performed by: ORTHOPAEDIC SURGERY

## 2021-07-06 PROCEDURE — 80053 COMPREHEN METABOLIC PANEL: CPT

## 2021-07-06 PROCEDURE — 77030040356 HC CORD BPLR FRCP COVD -A: Performed by: ORTHOPAEDIC SURGERY

## 2021-07-06 PROCEDURE — 77030014650 HC SEAL MTRX FLOSEL BAXT -C: Performed by: ORTHOPAEDIC SURGERY

## 2021-07-06 PROCEDURE — 81001 URINALYSIS AUTO W/SCOPE: CPT

## 2021-07-06 PROCEDURE — 77030041248 HC GRFT BN OSTEOAMP BTUS -G: Performed by: ORTHOPAEDIC SURGERY

## 2021-07-06 PROCEDURE — 77030002986 HC SUT PROL J&J -A: Performed by: ORTHOPAEDIC SURGERY

## 2021-07-06 PROCEDURE — 74011250636 HC RX REV CODE- 250/636: Performed by: NURSE ANESTHETIST, CERTIFIED REGISTERED

## 2021-07-06 PROCEDURE — 77030013079 HC BLNKT BAIR HGGR 3M -A: Performed by: ANESTHESIOLOGY

## 2021-07-06 PROCEDURE — 77030009868 HC PIN DISTR CASPR AESC -B: Performed by: ORTHOPAEDIC SURGERY

## 2021-07-06 PROCEDURE — 77030012407 HC DRN WND BARD -B: Performed by: ORTHOPAEDIC SURGERY

## 2021-07-06 PROCEDURE — 0RG20A0 FUSION OF 2 OR MORE CERVICAL VERTEBRAL JOINTS WITH INTERBODY FUSION DEVICE, ANTERIOR APPROACH, ANTERIOR COLUMN, OPEN APPROACH: ICD-10-PCS | Performed by: ORTHOPAEDIC SURGERY

## 2021-07-06 PROCEDURE — 77030029099 HC BN WAX SSPC -A: Performed by: ORTHOPAEDIC SURGERY

## 2021-07-06 PROCEDURE — 76060000036 HC ANESTHESIA 2.5 TO 3 HR: Performed by: ORTHOPAEDIC SURGERY

## 2021-07-06 PROCEDURE — 72040 X-RAY EXAM NECK SPINE 2-3 VW: CPT

## 2021-07-06 PROCEDURE — 77030013567 HC DRN WND RESERV BARD -A: Performed by: ORTHOPAEDIC SURGERY

## 2021-07-06 PROCEDURE — 77030040361 HC SLV COMPR DVT MDII -B: Performed by: ORTHOPAEDIC SURGERY

## 2021-07-06 DEVICE — FORTIBRIDGE VARIABLE SCREW 4.5MM X 16MM
Type: IMPLANTABLE DEVICE | Site: SPINE CERVICAL | Status: FUNCTIONAL
Brand: FORTIBRIDGETM

## 2021-07-06 DEVICE — FORTICORE® FLAT CERVICAL SPACER 7X16X14, (6°)
Type: IMPLANTABLE DEVICE | Site: SPINE CERVICAL | Status: FUNCTIONAL
Brand: FORTICORE®

## 2021-07-06 DEVICE — FORTIBRIDGE®   SELF-DRILL VARIABLE SCREW 4.0 X 16
Type: IMPLANTABLE DEVICE | Site: SPINE CERVICAL | Status: FUNCTIONAL
Brand: FORTIBRIDGE®

## 2021-07-06 DEVICE — FORTICORE® FLAT CERVICAL SPACER 6X16X14, (6°)
Type: IMPLANTABLE DEVICE | Site: SPINE CERVICAL | Status: FUNCTIONAL
Brand: FORTICORE®

## 2021-07-06 RX ORDER — BACLOFEN 10 MG/1
5 TABLET ORAL
Status: DISCONTINUED | OUTPATIENT
Start: 2021-07-06 | End: 2021-07-06

## 2021-07-06 RX ORDER — SENNOSIDES 8.6 MG/1
2 TABLET ORAL 2 TIMES DAILY
Status: DISCONTINUED | OUTPATIENT
Start: 2021-07-06 | End: 2021-07-07

## 2021-07-06 RX ORDER — MIDAZOLAM HYDROCHLORIDE 1 MG/ML
INJECTION, SOLUTION INTRAMUSCULAR; INTRAVENOUS AS NEEDED
Status: DISCONTINUED | OUTPATIENT
Start: 2021-07-06 | End: 2021-07-06 | Stop reason: HOSPADM

## 2021-07-06 RX ORDER — PROPOFOL 10 MG/ML
INJECTION, EMULSION INTRAVENOUS AS NEEDED
Status: DISCONTINUED | OUTPATIENT
Start: 2021-07-06 | End: 2021-07-06 | Stop reason: HOSPADM

## 2021-07-06 RX ORDER — LEVETIRACETAM 500 MG/1
1000 TABLET ORAL 2 TIMES DAILY
Status: DISCONTINUED | OUTPATIENT
Start: 2021-07-06 | End: 2021-07-06

## 2021-07-06 RX ORDER — IPRATROPIUM BROMIDE 0.5 MG/2.5ML
0.5 SOLUTION RESPIRATORY (INHALATION)
Status: DISCONTINUED | OUTPATIENT
Start: 2021-07-06 | End: 2021-07-14 | Stop reason: HOSPADM

## 2021-07-06 RX ORDER — PANTOPRAZOLE SODIUM 40 MG/1
40 TABLET, DELAYED RELEASE ORAL
Status: DISCONTINUED | OUTPATIENT
Start: 2021-07-07 | End: 2021-07-14 | Stop reason: HOSPADM

## 2021-07-06 RX ORDER — DIAZEPAM 5 MG/1
5 TABLET ORAL
Status: DISCONTINUED | OUTPATIENT
Start: 2021-07-06 | End: 2021-07-06

## 2021-07-06 RX ORDER — ACETAMINOPHEN 500 MG
1000 TABLET ORAL EVERY 6 HOURS
Status: DISCONTINUED | OUTPATIENT
Start: 2021-07-07 | End: 2021-07-14 | Stop reason: HOSPADM

## 2021-07-06 RX ORDER — LANOLIN ALCOHOL/MO/W.PET/CERES
1000 CREAM (GRAM) TOPICAL DAILY
Status: DISCONTINUED | OUTPATIENT
Start: 2021-07-07 | End: 2021-07-14 | Stop reason: HOSPADM

## 2021-07-06 RX ORDER — ONDANSETRON 2 MG/ML
4 INJECTION INTRAMUSCULAR; INTRAVENOUS AS NEEDED
Status: DISCONTINUED | OUTPATIENT
Start: 2021-07-06 | End: 2021-07-06 | Stop reason: HOSPADM

## 2021-07-06 RX ORDER — OXYCODONE HYDROCHLORIDE 5 MG/1
10 TABLET ORAL
Status: DISCONTINUED | OUTPATIENT
Start: 2021-07-06 | End: 2021-07-14 | Stop reason: HOSPADM

## 2021-07-06 RX ORDER — IPRATROPIUM BROMIDE AND ALBUTEROL SULFATE 2.5; .5 MG/3ML; MG/3ML
3 SOLUTION RESPIRATORY (INHALATION)
Status: DISCONTINUED | OUTPATIENT
Start: 2021-07-06 | End: 2021-07-06

## 2021-07-06 RX ORDER — HYDROMORPHONE HYDROCHLORIDE 1 MG/ML
.2-.5 INJECTION, SOLUTION INTRAMUSCULAR; INTRAVENOUS; SUBCUTANEOUS
Status: DISCONTINUED | OUTPATIENT
Start: 2021-07-06 | End: 2021-07-06 | Stop reason: HOSPADM

## 2021-07-06 RX ORDER — PREGABALIN 150 MG/1
150 CAPSULE ORAL ONCE
Status: DISCONTINUED | OUTPATIENT
Start: 2021-07-06 | End: 2021-07-06 | Stop reason: HOSPADM

## 2021-07-06 RX ORDER — LEVETIRACETAM 500 MG/1
1000 TABLET ORAL 2 TIMES DAILY
Status: DISCONTINUED | OUTPATIENT
Start: 2021-07-06 | End: 2021-07-08 | Stop reason: CLARIF

## 2021-07-06 RX ORDER — IPRATROPIUM BROMIDE AND ALBUTEROL SULFATE 2.5; .5 MG/3ML; MG/3ML
3 SOLUTION RESPIRATORY (INHALATION)
Status: DISCONTINUED | OUTPATIENT
Start: 2021-07-06 | End: 2021-07-14 | Stop reason: HOSPADM

## 2021-07-06 RX ORDER — GLYCOPYRROLATE 0.2 MG/ML
INJECTION INTRAMUSCULAR; INTRAVENOUS AS NEEDED
Status: DISCONTINUED | OUTPATIENT
Start: 2021-07-06 | End: 2021-07-06 | Stop reason: HOSPADM

## 2021-07-06 RX ORDER — HYDROMORPHONE HYDROCHLORIDE 1 MG/ML
0.5 INJECTION, SOLUTION INTRAMUSCULAR; INTRAVENOUS; SUBCUTANEOUS ONCE
Status: COMPLETED | OUTPATIENT
Start: 2021-07-06 | End: 2021-07-06

## 2021-07-06 RX ORDER — ACETAMINOPHEN 500 MG
1000 TABLET ORAL ONCE
Status: DISCONTINUED | OUTPATIENT
Start: 2021-07-06 | End: 2021-07-06 | Stop reason: HOSPADM

## 2021-07-06 RX ORDER — SODIUM CHLORIDE 0.9 % (FLUSH) 0.9 %
5-40 SYRINGE (ML) INJECTION AS NEEDED
Status: DISCONTINUED | OUTPATIENT
Start: 2021-07-06 | End: 2021-07-14 | Stop reason: HOSPADM

## 2021-07-06 RX ORDER — LANOLIN ALCOHOL/MO/W.PET/CERES
50 CREAM (GRAM) TOPICAL 2 TIMES DAILY
Status: DISCONTINUED | OUTPATIENT
Start: 2021-07-06 | End: 2021-07-14 | Stop reason: HOSPADM

## 2021-07-06 RX ORDER — ROCURONIUM BROMIDE 10 MG/ML
INJECTION, SOLUTION INTRAVENOUS AS NEEDED
Status: DISCONTINUED | OUTPATIENT
Start: 2021-07-06 | End: 2021-07-06 | Stop reason: HOSPADM

## 2021-07-06 RX ORDER — CYCLOBENZAPRINE HCL 10 MG
10 TABLET ORAL
Status: DISCONTINUED | OUTPATIENT
Start: 2021-07-06 | End: 2021-07-06

## 2021-07-06 RX ORDER — SODIUM CHLORIDE 0.9 % (FLUSH) 0.9 %
5-40 SYRINGE (ML) INJECTION EVERY 8 HOURS
Status: DISCONTINUED | OUTPATIENT
Start: 2021-07-06 | End: 2021-07-14 | Stop reason: HOSPADM

## 2021-07-06 RX ORDER — HYDROMORPHONE HYDROCHLORIDE 2 MG/ML
INJECTION, SOLUTION INTRAMUSCULAR; INTRAVENOUS; SUBCUTANEOUS AS NEEDED
Status: DISCONTINUED | OUTPATIENT
Start: 2021-07-06 | End: 2021-07-06 | Stop reason: HOSPADM

## 2021-07-06 RX ORDER — AMOXICILLIN 250 MG
1 CAPSULE ORAL 2 TIMES DAILY
Status: DISCONTINUED | OUTPATIENT
Start: 2021-07-06 | End: 2021-07-14 | Stop reason: HOSPADM

## 2021-07-06 RX ORDER — ERGOCALCIFEROL 1.25 MG/1
50000 CAPSULE ORAL
Status: DISCONTINUED | OUTPATIENT
Start: 2021-07-12 | End: 2021-07-14 | Stop reason: HOSPADM

## 2021-07-06 RX ORDER — SODIUM CHLORIDE, SODIUM LACTATE, POTASSIUM CHLORIDE, CALCIUM CHLORIDE 600; 310; 30; 20 MG/100ML; MG/100ML; MG/100ML; MG/100ML
25 INJECTION, SOLUTION INTRAVENOUS CONTINUOUS
Status: DISCONTINUED | OUTPATIENT
Start: 2021-07-06 | End: 2021-07-06 | Stop reason: HOSPADM

## 2021-07-06 RX ORDER — DEXMEDETOMIDINE HYDROCHLORIDE 100 UG/ML
INJECTION, SOLUTION INTRAVENOUS AS NEEDED
Status: DISCONTINUED | OUTPATIENT
Start: 2021-07-06 | End: 2021-07-06 | Stop reason: HOSPADM

## 2021-07-06 RX ORDER — HYDROMORPHONE HYDROCHLORIDE 1 MG/ML
INJECTION, SOLUTION INTRAMUSCULAR; INTRAVENOUS; SUBCUTANEOUS
Status: DISPENSED
Start: 2021-07-06 | End: 2021-07-07

## 2021-07-06 RX ORDER — FACIAL-BODY WIPES
10 EACH TOPICAL DAILY PRN
Status: DISCONTINUED | OUTPATIENT
Start: 2021-07-08 | End: 2021-07-14 | Stop reason: HOSPADM

## 2021-07-06 RX ORDER — POLYETHYLENE GLYCOL 3350 17 G/17G
17 POWDER, FOR SOLUTION ORAL DAILY
Status: DISCONTINUED | OUTPATIENT
Start: 2021-07-07 | End: 2021-07-14 | Stop reason: HOSPADM

## 2021-07-06 RX ORDER — GUAIFENESIN 600 MG/1
1200 TABLET, EXTENDED RELEASE ORAL 2 TIMES DAILY
Status: DISCONTINUED | OUTPATIENT
Start: 2021-07-06 | End: 2021-07-14 | Stop reason: HOSPADM

## 2021-07-06 RX ORDER — FENTANYL CITRATE 50 UG/ML
INJECTION, SOLUTION INTRAMUSCULAR; INTRAVENOUS AS NEEDED
Status: DISCONTINUED | OUTPATIENT
Start: 2021-07-06 | End: 2021-07-06 | Stop reason: HOSPADM

## 2021-07-06 RX ORDER — NALOXONE HYDROCHLORIDE 0.4 MG/ML
0.4 INJECTION, SOLUTION INTRAMUSCULAR; INTRAVENOUS; SUBCUTANEOUS AS NEEDED
Status: DISCONTINUED | OUTPATIENT
Start: 2021-07-06 | End: 2021-07-07

## 2021-07-06 RX ORDER — ONDANSETRON 2 MG/ML
INJECTION INTRAMUSCULAR; INTRAVENOUS AS NEEDED
Status: DISCONTINUED | OUTPATIENT
Start: 2021-07-06 | End: 2021-07-06 | Stop reason: HOSPADM

## 2021-07-06 RX ORDER — NEOSTIGMINE METHYLSULFATE 1 MG/ML
INJECTION, SOLUTION INTRAVENOUS AS NEEDED
Status: DISCONTINUED | OUTPATIENT
Start: 2021-07-06 | End: 2021-07-06 | Stop reason: HOSPADM

## 2021-07-06 RX ORDER — LANOLIN ALCOHOL/MO/W.PET/CERES
1 CREAM (GRAM) TOPICAL
Status: DISCONTINUED | OUTPATIENT
Start: 2021-07-07 | End: 2021-07-14 | Stop reason: HOSPADM

## 2021-07-06 RX ORDER — HYDROXYZINE 25 MG/1
25 TABLET, FILM COATED ORAL
Status: DISCONTINUED | OUTPATIENT
Start: 2021-07-06 | End: 2021-07-07

## 2021-07-06 RX ORDER — BACLOFEN 10 MG/1
10 TABLET ORAL
Status: DISCONTINUED | OUTPATIENT
Start: 2021-07-06 | End: 2021-07-06

## 2021-07-06 RX ORDER — LIDOCAINE HYDROCHLORIDE 20 MG/ML
INJECTION, SOLUTION EPIDURAL; INFILTRATION; INTRACAUDAL; PERINEURAL AS NEEDED
Status: DISCONTINUED | OUTPATIENT
Start: 2021-07-06 | End: 2021-07-06 | Stop reason: HOSPADM

## 2021-07-06 RX ORDER — HYDROXYZINE HYDROCHLORIDE 10 MG/1
10 TABLET, FILM COATED ORAL
Status: DISCONTINUED | OUTPATIENT
Start: 2021-07-06 | End: 2021-07-14 | Stop reason: HOSPADM

## 2021-07-06 RX ORDER — CYCLOBENZAPRINE HCL 10 MG
10 TABLET ORAL
Status: DISCONTINUED | OUTPATIENT
Start: 2021-07-06 | End: 2021-07-14 | Stop reason: HOSPADM

## 2021-07-06 RX ORDER — PHENYLEPHRINE HCL IN 0.9% NACL 0.4MG/10ML
SYRINGE (ML) INTRAVENOUS AS NEEDED
Status: DISCONTINUED | OUTPATIENT
Start: 2021-07-06 | End: 2021-07-06 | Stop reason: HOSPADM

## 2021-07-06 RX ORDER — FENTANYL CITRATE 50 UG/ML
25 INJECTION, SOLUTION INTRAMUSCULAR; INTRAVENOUS
Status: COMPLETED | OUTPATIENT
Start: 2021-07-06 | End: 2021-07-06

## 2021-07-06 RX ORDER — HYDROMORPHONE HYDROCHLORIDE 1 MG/ML
1 INJECTION, SOLUTION INTRAMUSCULAR; INTRAVENOUS; SUBCUTANEOUS
Status: DISCONTINUED | OUTPATIENT
Start: 2021-07-06 | End: 2021-07-07

## 2021-07-06 RX ORDER — SODIUM CHLORIDE 9 MG/ML
125 INJECTION, SOLUTION INTRAVENOUS CONTINUOUS
Status: DISCONTINUED | OUTPATIENT
Start: 2021-07-06 | End: 2021-07-07

## 2021-07-06 RX ORDER — ONDANSETRON 2 MG/ML
4 INJECTION INTRAMUSCULAR; INTRAVENOUS
Status: ACTIVE | OUTPATIENT
Start: 2021-07-06 | End: 2021-07-07

## 2021-07-06 RX ORDER — DIAZEPAM 5 MG/1
5 TABLET ORAL
Status: DISCONTINUED | OUTPATIENT
Start: 2021-07-06 | End: 2021-07-14 | Stop reason: HOSPADM

## 2021-07-06 RX ORDER — SUCCINYLCHOLINE CHLORIDE 20 MG/ML
INJECTION INTRAMUSCULAR; INTRAVENOUS AS NEEDED
Status: DISCONTINUED | OUTPATIENT
Start: 2021-07-06 | End: 2021-07-06 | Stop reason: HOSPADM

## 2021-07-06 RX ORDER — OXYCODONE HYDROCHLORIDE 5 MG/1
5 TABLET ORAL
Status: DISCONTINUED | OUTPATIENT
Start: 2021-07-06 | End: 2021-07-14 | Stop reason: HOSPADM

## 2021-07-06 RX ORDER — FAMOTIDINE 20 MG/1
20 TABLET, FILM COATED ORAL 2 TIMES DAILY
Status: DISCONTINUED | OUTPATIENT
Start: 2021-07-06 | End: 2021-07-07

## 2021-07-06 RX ADMIN — FLUTICASONE PROPIONATE 2 SPRAY: 50 SPRAY, METERED NASAL at 08:13

## 2021-07-06 RX ADMIN — FENTANYL CITRATE 25 MCG: 50 INJECTION, SOLUTION INTRAMUSCULAR; INTRAVENOUS at 18:29

## 2021-07-06 RX ADMIN — LIDOCAINE HYDROCHLORIDE 100 MG: 20 INJECTION, SOLUTION INTRAVENOUS at 15:34

## 2021-07-06 RX ADMIN — DULOXETINE 120 MG: 30 CAPSULE, DELAYED RELEASE ORAL at 08:10

## 2021-07-06 RX ADMIN — ACETAMINOPHEN 650 MG: 325 TABLET ORAL at 04:03

## 2021-07-06 RX ADMIN — CYCLOBENZAPRINE 10 MG: 10 TABLET, FILM COATED ORAL at 08:11

## 2021-07-06 RX ADMIN — DEXMEDETOMIDINE HYDROCHLORIDE 10 MCG: 100 INJECTION, SOLUTION, CONCENTRATE INTRAVENOUS at 18:00

## 2021-07-06 RX ADMIN — FENTANYL CITRATE 25 MCG: 50 INJECTION, SOLUTION INTRAMUSCULAR; INTRAVENOUS at 18:46

## 2021-07-06 RX ADMIN — ROCURONIUM BROMIDE 45 MG: 10 INJECTION INTRAVENOUS at 16:05

## 2021-07-06 RX ADMIN — ARFORMOTEROL TARTRATE: 15 SOLUTION RESPIRATORY (INHALATION) at 08:33

## 2021-07-06 RX ADMIN — FENTANYL CITRATE 25 MCG: 50 INJECTION, SOLUTION INTRAMUSCULAR; INTRAVENOUS at 18:50

## 2021-07-06 RX ADMIN — Medication 10 ML: at 21:36

## 2021-07-06 RX ADMIN — IPRATROPIUM BROMIDE 1 SPRAY: 42 SPRAY NASAL at 08:13

## 2021-07-06 RX ADMIN — ACETAMINOPHEN 650 MG: 325 TABLET ORAL at 08:11

## 2021-07-06 RX ADMIN — Medication 1 AMPULE: at 21:36

## 2021-07-06 RX ADMIN — FENTANYL CITRATE 25 MCG: 50 INJECTION, SOLUTION INTRAMUSCULAR; INTRAVENOUS at 18:35

## 2021-07-06 RX ADMIN — TRAZODONE HYDROCHLORIDE 100 MG: 100 TABLET ORAL at 00:30

## 2021-07-06 RX ADMIN — SODIUM CHLORIDE, POTASSIUM CHLORIDE, SODIUM LACTATE AND CALCIUM CHLORIDE 25 ML/HR: 600; 310; 30; 20 INJECTION, SOLUTION INTRAVENOUS at 13:45

## 2021-07-06 RX ADMIN — FENTANYL CITRATE 25 MCG: 50 INJECTION, SOLUTION INTRAMUSCULAR; INTRAVENOUS at 18:32

## 2021-07-06 RX ADMIN — BUSPIRONE HYDROCHLORIDE 15 MG: 5 TABLET ORAL at 08:10

## 2021-07-06 RX ADMIN — FAMOTIDINE 20 MG: 20 TABLET, FILM COATED ORAL at 21:35

## 2021-07-06 RX ADMIN — GABAPENTIN 600 MG: 300 CAPSULE ORAL at 08:11

## 2021-07-06 RX ADMIN — FENTANYL CITRATE 50 MCG: 50 INJECTION, SOLUTION INTRAMUSCULAR; INTRAVENOUS at 15:34

## 2021-07-06 RX ADMIN — BACLOFEN 10 MG: 10 TABLET ORAL at 00:31

## 2021-07-06 RX ADMIN — SUCCINYLCHOLINE CHLORIDE 140 MG: 20 INJECTION, SOLUTION INTRAMUSCULAR; INTRAVENOUS at 15:34

## 2021-07-06 RX ADMIN — PYRIDOXINE HCL TAB 50 MG 50 MG: 50 TAB at 21:36

## 2021-07-06 RX ADMIN — ROCURONIUM BROMIDE 10 MG: 10 INJECTION INTRAVENOUS at 16:31

## 2021-07-06 RX ADMIN — HYDROMORPHONE HYDROCHLORIDE 0.5 MG: 1 INJECTION, SOLUTION INTRAMUSCULAR; INTRAVENOUS; SUBCUTANEOUS at 18:58

## 2021-07-06 RX ADMIN — HYDROMORPHONE HYDROCHLORIDE 1 MG: 2 INJECTION, SOLUTION INTRAMUSCULAR; INTRAVENOUS; SUBCUTANEOUS at 17:45

## 2021-07-06 RX ADMIN — HYDROMORPHONE HYDROCHLORIDE 0.5 MG: 1 INJECTION, SOLUTION INTRAMUSCULAR; INTRAVENOUS; SUBCUTANEOUS at 19:26

## 2021-07-06 RX ADMIN — WATER 2 G: 1 INJECTION INTRAMUSCULAR; INTRAVENOUS; SUBCUTANEOUS at 15:43

## 2021-07-06 RX ADMIN — GABAPENTIN 200 MG: 100 CAPSULE ORAL at 08:11

## 2021-07-06 RX ADMIN — DEXMEDETOMIDINE HYDROCHLORIDE 10 MCG: 100 INJECTION, SOLUTION, CONCENTRATE INTRAVENOUS at 18:06

## 2021-07-06 RX ADMIN — LEVETIRACETAM 1000 MG: 500 TABLET ORAL at 09:06

## 2021-07-06 RX ADMIN — IPRATROPIUM BROMIDE 1 SPRAY: 42 SPRAY NASAL at 00:31

## 2021-07-06 RX ADMIN — GUAIFENESIN 1200 MG: 600 TABLET, EXTENDED RELEASE ORAL at 21:36

## 2021-07-06 RX ADMIN — OXYCODONE 5 MG: 5 TABLET ORAL at 06:37

## 2021-07-06 RX ADMIN — ACETAMINOPHEN 650 MG: 325 TABLET ORAL at 21:35

## 2021-07-06 RX ADMIN — LEVETIRACETAM 1000 MG: 500 TABLET ORAL at 21:35

## 2021-07-06 RX ADMIN — Medication 1 LOZENGE: at 22:03

## 2021-07-06 RX ADMIN — FENTANYL CITRATE 50 MCG: 50 INJECTION, SOLUTION INTRAMUSCULAR; INTRAVENOUS at 15:46

## 2021-07-06 RX ADMIN — FENTANYL CITRATE 25 MCG: 50 INJECTION, SOLUTION INTRAMUSCULAR; INTRAVENOUS at 18:53

## 2021-07-06 RX ADMIN — METOPROLOL SUCCINATE 25 MG: 25 TABLET, EXTENDED RELEASE ORAL at 08:11

## 2021-07-06 RX ADMIN — Medication 120 MCG: at 16:44

## 2021-07-06 RX ADMIN — RISPERIDONE 0.5 MG: 1 TABLET ORAL at 08:11

## 2021-07-06 RX ADMIN — NALTREXONE HYDROCHLORIDE 50 MG: 50 TABLET, FILM COATED ORAL at 08:10

## 2021-07-06 RX ADMIN — Medication 3 AMPULE: at 13:45

## 2021-07-06 RX ADMIN — HYDROMORPHONE HYDROCHLORIDE 0.5 MG: 1 INJECTION, SOLUTION INTRAMUSCULAR; INTRAVENOUS; SUBCUTANEOUS at 19:47

## 2021-07-06 RX ADMIN — OXYCODONE 10 MG: 5 TABLET ORAL at 22:03

## 2021-07-06 RX ADMIN — NEOSTIGMINE METHYLSULFATE 3 MG: 1 INJECTION, SOLUTION INTRAVENOUS at 17:42

## 2021-07-06 RX ADMIN — HYDROMORPHONE HYDROCHLORIDE 1 MG: 2 INJECTION, SOLUTION INTRAMUSCULAR; INTRAVENOUS; SUBCUTANEOUS at 16:10

## 2021-07-06 RX ADMIN — Medication 10 ML: at 06:33

## 2021-07-06 RX ADMIN — DIAZEPAM 5 MG: 5 TABLET ORAL at 19:00

## 2021-07-06 RX ADMIN — FENTANYL CITRATE 25 MCG: 50 INJECTION, SOLUTION INTRAMUSCULAR; INTRAVENOUS at 18:39

## 2021-07-06 RX ADMIN — GLYCOPYRROLATE 0.2 MG: 0.2 INJECTION, SOLUTION INTRAMUSCULAR; INTRAVENOUS at 17:42

## 2021-07-06 RX ADMIN — MIDAZOLAM HYDROCHLORIDE 2 MG: 1 INJECTION, SOLUTION INTRAMUSCULAR; INTRAVENOUS at 15:30

## 2021-07-06 RX ADMIN — SODIUM CHLORIDE 125 ML/HR: 9 INJECTION, SOLUTION INTRAVENOUS at 22:04

## 2021-07-06 RX ADMIN — SODIUM CHLORIDE 40 MCG/MIN: 900 INJECTION, SOLUTION INTRAVENOUS at 16:56

## 2021-07-06 RX ADMIN — FENTANYL CITRATE 25 MCG: 50 INJECTION, SOLUTION INTRAMUSCULAR; INTRAVENOUS at 18:43

## 2021-07-06 RX ADMIN — BACLOFEN 10 MG: 10 TABLET ORAL at 08:11

## 2021-07-06 RX ADMIN — ROCURONIUM BROMIDE 5 MG: 10 INJECTION INTRAVENOUS at 15:34

## 2021-07-06 RX ADMIN — FENTANYL CITRATE 100 MCG: 50 INJECTION, SOLUTION INTRAMUSCULAR; INTRAVENOUS at 16:52

## 2021-07-06 RX ADMIN — PROPOFOL 150 MG: 10 INJECTION, EMULSION INTRAVENOUS at 15:34

## 2021-07-06 RX ADMIN — ONDANSETRON HYDROCHLORIDE 4 MG: 2 INJECTION, SOLUTION INTRAMUSCULAR; INTRAVENOUS at 17:42

## 2021-07-06 RX ADMIN — HYDROMORPHONE HYDROCHLORIDE 0.5 MG: 1 INJECTION, SOLUTION INTRAMUSCULAR; INTRAVENOUS; SUBCUTANEOUS at 19:13

## 2021-07-06 RX ADMIN — HYDROMORPHONE HYDROCHLORIDE 0.5 MG: 1 INJECTION, SOLUTION INTRAMUSCULAR; INTRAVENOUS; SUBCUTANEOUS at 20:22

## 2021-07-06 NOTE — PROGRESS NOTES
Ortho NP Progress Note:    Pt seen resting in bed. Drowsy, eyes open to voice, appropriate in conversation. States he takes baclofen and flexeril as needed PTA, and usually only takes baclofen 5mg, feels over drowsy from recent medicine - will adjust   Complaints of vague symptoms, intermittent pain in BUE (right > left) and RLE. Also reports weakness in BUE, especially hands with tremors/spasms in BUE and BLE. James  4-/5. Deltoids 4-/5. BLE PF 4/5, DF 3/5. Barely able to lift legs off bed. States he uses RW to ambulate, lives alone, independent in ADLs, has CNA who helps with IADLs and local [employer] friends who also provide some support. Cleared by Medicine and Pulmonary for surgery. He is to remain NPO for OR today at 14:30.      Dawna Kasper NP

## 2021-07-06 NOTE — CONSULTS
Pulmonary, Critical Care, and Sleep Medicine    Initial Patient Consult    Name: Jerry Mcbride MRN: [id number]   : 1960 Hospital: [facility]   Date: 2021        IMPRESSION:   · Chronically abnormal chest CT, followed by [facility]  · COPD followed by [facility]  · Chronic hypoxic respiratory failure on 3 LPM O2 with exertion at baseline  · Here for C3-7 fusion  · History of tobacco use, quit 7 years ago      RECOMMENDATIONS:   · Currently at baseline  · He should continue to follow up with [facility] for these chronic, established problems (he is undergoing serial imaging there)  · Continue his home bronchodilators - Symbicort/Spiriva, would treat with albuterol in the perioperative period  · May be at some increased surgical risk for respiratory complications due to his chronic underlying lung problems, but there are no modifiable risk factors at this time  · As above, follow up with his regular pulmonologist after discharge  · Will be available PRN if any issues arise     Subjective: This patient has been seen and evaluated at the request of Atlas Goldberg, PA for \"clear for surgery\". Patient is a 62 y.o. male who was planned for a C3-7 fusion for today. He had a preop chest X-ray which was abnormal and he was therefore admitted. I took a history from the patient, and these findings are chronic. He is followed by [facility] for these findings with serial imaging. He has COPD and is maintained on bronchodilators and exertional O2. Apparently none of this history was asked of him, so he was admitted for an abnormal chest X-ray. He was at his baseline in terms of his respiratory status until he heard there might be something wrong with his chest X-ray. He is anxious to have surgery performed.        Past Medical History:   Diagnosis Date    Anxiety     Arthritis  Bullous emphysema (HCC)     Chronic obstructive pulmonary disease (HCC)     O2 2L sitting - 3L walking -- 24hr/day    Chronic pain     BACK AND LEGS    Elevated hemoglobin A1c 9/25/2019    GERD (gastroesophageal reflux disease)     H/O cardiovascular stress test 06/03/2019    6/3/19 \"Negative for ischemia. Preserved LV function. EF 65%. No wall motion abnormalities. \" per Dr Ady Hood notes 6/3/19.    H/O ETOH abuse     Hypertension     MRSA carrier 09/26/2019    On home oxygen therapy     uses 3L with activity    Opioid dependence ([facility] [address]     per PCP clearance 2020    Pneumothorax 2009 & 2010    right & left    Psychiatric disorder     DEPRESSION    Seizures ([facility] [address] 2014    Sleep apnea 2020    not currently using CPAP     Stroke ([facility] [address] 2015    \"mini-stroke'      Past Surgical History:   Procedure Laterality Date    HX HERNIA REPAIR Right 2015    HX HERNIA REPAIR Left 2015    HX OTHER SURGICAL  2009 & 2010    CHEST TUBE WITH COLLAPSED LUNGS    HX ROTATOR CUFF REPAIR Right 2016    HX ROTATOR CUFF REPAIR Left 2016      Prior to Admission medications    Medication Sig Start Date End Date Taking? Authorizing Provider   metoprolol succinate (TOPROL-XL) 25 mg XL tablet Take 25 mg by mouth daily. Yes Provider, Historical   hydrOXYzine HCL (ATARAX) 25 mg tablet Take  by mouth three (3) times daily as needed. Yes Provider, Historical   traZODone (DESYREL) 100 mg tablet Take 100 mg by mouth nightly. Take 2-3 tablets   Yes Provider, Historical   diclofenac potassium (CATAFLAM) 50 mg tablet Take 50 mg by mouth two (2) times daily as needed. Yes Provider, Historical   DULoxetine (CYMBALTA) 60 mg capsule Take 120 mg by mouth daily. Yes Provider, Historical   risperiDONE (RisperDAL) 1 mg tablet Take 1 mg by mouth three (3) times daily. Yes Provider, Historical   cyanocobalamin (Vitamin B-12) 1,000 mcg tablet Take 1,000 mcg by mouth daily.    Yes Provider, Historical   oxyCODONE IR (ROXICODONE) 5 mg immediate release tablet Take 7.5 mg by mouth three (3) times daily as needed for Pain. Yes Provider, Historical   acetaminophen (TYLENOL) 500 mg tablet TAKE 2 TABLETS BY MOUTH EVERY 6 HOURS AS NEEDED FOR MILD PAIN OR MODERATE PAIN FOR UP TO 10 DAYS 5/5/21  Yes Provider, Historical   Ear Drops 6.5 % otic solution INSTILL 5 DROPS INTO BOTH EARS 2 TIMES PER DAY FOR 5 DAYS 4/6/21  Yes Provider, Historical   cyclobenzaprine (FLEXERIL) 10 mg tablet Take 10 mg by mouth daily. 4/12/21  Yes Provider, Historical   diazePAM (VALIUM) 10 mg tablet Take 10 mg by mouth. 5/6/21  Yes Provider, Historical   ergocalciferol (ERGOCALCIFEROL) 1,250 mcg (50,000 unit) capsule TAKE 1 CAPSULE BY ORAL ROUTE WEEKLY FOR 90 DAYS 8/5/20  Yes Provider, Historical   fluticasone propionate (FLONASE) 50 mcg/actuation nasal spray INSTILL 2 SPRAYS IN THE NOSTRILS DAILY 8/5/20  Yes Provider, Historical   Mucinex 600 mg ER tablet 1,200 mg two (2) times a day. 3/17/21  Yes Provider, Historical   hydrOXYzine pamoate (VISTARIL) 50 mg capsule Take 50 mg by mouth. Yes Provider, Historical   ipratropium (ATROVENT) 42 mcg (0.06 %) nasal spray 1 [place] by Nasal route three (3) times daily. 1-2 squirts in the nostrils 8/5/20  Yes Provider, Historical   senna (Senexon) 8.6 mg tablet Take 2 Tabs by mouth two (2) times a day. Yes Provider, Historical   ferrous sulfate 325 mg (65 mg iron) tablet Take  by mouth Daily (before breakfast). Yes Provider, Historical   montelukast (SINGULAIR) 10 mg tablet Take 10 mg by mouth nightly. Yes Provider, Historical   pyridoxine, vitamin B6, (VITAMIN B-6) 50 mg tablet Take 50 mg by mouth two (2) times a day. Yes Provider, Historical   gabapentin (NEURONTIN) 800 mg tablet Take 800 mg by mouth three (3) times daily. Yes Provider, Historical   ondansetron hcl (ZOFRAN) 4 mg tablet Take 4 mg by mouth every six (6) hours as needed for Nausea.    Yes Provider, Historical   albuterol-ipratropium (DUO-NEB) 2.5 mg-0.5 mg/3 ml nebu 3 mL by Nebulization route every six (6) hours as needed. Yes Provider, Historical   Oxygen Indications: 3 LITERS NC AT BEDTIME   Yes Provider, Historical   atorvastatin (LIPITOR) 40 mg tablet Take 40 mg by mouth nightly. Yes Provider, Historical   baclofen (LIORESAL) 10 mg tablet Take 10 mg by mouth three (3) times daily. Yes Provider, Historical   budesonide-formoterol (SYMBICORT) 80-4.5 mcg/actuation HFAA Take 2 Puffs by inhalation two (2) times a day. Indications: BRONCHOSPASM PREVENTION WITH COPD 18  Yes Juno Awad MD   busPIRone (BUSPAR) 15 mg tablet Take 1 Tab by mouth three (3) times daily. Indications: Generalized Anxiety Disorder  Patient taking differently: Take 30 mg by mouth two (2) times a day. Indications: repeated episodes of anxiety 18  Yes Juno Awad MD   levETIRAcetam (KEPPRA) 1,000 mg tablet Take 1 Tab by mouth two (2) times a day. Indications: PARTIAL EPILEPSY TREATMENT ADJUNCT 18  Yes Juno Awad MD   naltrexone (DEPADE) 50 mg tablet Take 1 Tab by mouth daily. Indications: alcoholism 18  Yes Juno Awad MD   pantoprazole (PROTONIX) 40 mg tablet Take 1 Tab by mouth Before breakfast and dinner. Patient taking differently: Take 40 mg by mouth daily. 18  Yes Juno Awad MD   tiotropium (SPIRIVA) 18 mcg inhalation capsule Take 1 Cap by inhalation daily. Patient taking differently: Take 1 Capsule by inhalation two (2) times a day.  18  Yes Juno Awad MD     Allergies   Allergen Reactions    Chantix [Varenicline] Seizures      Social History     Tobacco Use    Smoking status: Former Smoker     Packs/day: 1.00     Years: 30.00     Pack years: 30.00     Quit date:      Years since quittin.5    Smokeless tobacco: Never Used   Substance Use Topics    Alcohol use: Yes     Comment: \"a beer every other day, liquor on special occassions\"      Family History   Problem Relation Age of Onset    Hypertension Mother    Nila Sykes Cancer Mother         BRAIN    Heart Attack Mother     Heart Attack Father     Hypertension Father     Hypertension Sister     Asthma Sister     Cancer Brother         COLON    Hypertension Brother     Anesth Problems Neg Hx         Current Facility-Administered Medications   Medication Dose Route Frequency    levETIRAcetam (KEPPRA) tablet 1,000 mg  1,000 mg Oral BID    atorvastatin (LIPITOR) tablet 40 mg  40 mg Oral QHS    baclofen (LIORESAL) tablet 10 mg  10 mg Oral TID    arformoterol 15 mcg/budesonide 0.5 mg neb solution   Nebulization BID    busPIRone (BUSPAR) tablet 15 mg  15 mg Oral TID    cyclobenzaprine (FLEXERIL) tablet 10 mg  10 mg Oral DAILY    DULoxetine (CYMBALTA) capsule 120 mg  120 mg Oral DAILY    fluticasone propionate (FLONASE) 50 mcg/actuation nasal spray 2 Spray  2 Spray Both Nostrils DAILY    ipratropium (ATROVENT) 42 mcg (0.06 %) nasal spray 1 Spray  1 Spray Both Nostrils TID    metoprolol succinate (TOPROL-XL) XL tablet 25 mg  25 mg Oral DAILY    montelukast (SINGULAIR) tablet 10 mg  10 mg Oral QHS    naltrexone (DEPADE) tablet 50 mg  50 mg Oral DAILY    risperiDONE (RisperDAL) tablet 0.5 mg  0.5 mg Oral TID    traZODone (DESYREL) tablet 100 mg  100 mg Oral QHS    0.9% sodium chloride infusion  75 mL/hr IntraVENous CONTINUOUS    sodium chloride (NS) flush 5-40 mL  5-40 mL IntraVENous Q8H    acetaminophen (TYLENOL) tablet 650 mg  650 mg Oral Q6H    senna-docusate (PERICOLACE) 8.6-50 mg per tablet 2 Tablet  2 Tablet Oral BID    ceFAZolin (ANCEF) 2 g in sterile water (preservative free) 20 mL IV syringe  2 g IntraVENous ON CALL TO OR    gabapentin (NEURONTIN) capsule 600 mg  600 mg Oral TID    And    gabapentin (NEURONTIN) capsule 200 mg  200 mg Oral TID       Review of Systems:  A comprehensive review of systems was negative except for that written in the HPI.     Objective:   Vital Signs:    Visit Vitals  BP (!) 141/87   Pulse 92   Temp 97.6 °F (36.4 °C)   Resp 18 Ht 5' 11\" (1.803 m)   Wt 97.5 kg (215 lb)   SpO2 100%   BMI 29.99 kg/m²       O2 Device: None (Room air)       Temp (24hrs), Av.6 °F (36.4 °C), Min:97 °F (36.1 °C), Max:98.2 °F (36.8 °C)       Intake/Output:   Last shift:      No intake/output data recorded. Last 3 shifts: 1901 -  07  In: -   Out: 850 [Urine:850]    Intake/Output Summary (Last 24 hours) at 2021 0935  Last data filed at 2021 0400  Gross per 24 hour   Intake    Output 850 ml   Net -850 ml      Physical Exam:   General:  Alert, cooperative, no distress, appears stated age. Head:  Normocephalic, without obvious abnormality, atraumatic. Eyes:  Conjunctivae/corneas clear. PERRL, EOMs intact. Nose: Nares normal. Septum midline. Mucosa normal.     Throat: Lips, mucosa, and tongue normal.     Neck: Supple, symmetrical, trachea midline    Back:   Symmetric, no curvature. ROM normal.   Lungs:   Clear to auscultation bilaterally. Chest wall:  No tenderness or deformity. Heart:  Regular rate and rhythm    Abdomen:   Soft, non-tender. Bowel sounds normal.    Extremities: Extremities normal, atraumatic, no cyanosis or edema. Skin: Skin color, texture, turgor normal. No rashes or lesions   Lymph nodes: Cervical, supraclavicular nodes normal.   Neurologic: Grossly nonfocal     Data review:     Recent Results (from the past 24 hour(s))   EKG, 12 LEAD, INITIAL    Collection Time: 21  2:51 PM   Result Value Ref Range    Ventricular Rate 84 BPM    Atrial Rate 84 BPM    P-R Interval 162 ms    QRS Duration 86 ms    Q-T Interval 378 ms    QTC Calculation (Bezet) 446 ms    Calculated P Axis 55 degrees    Calculated R Axis 27 degrees    Calculated T Axis 41 degrees    Diagnosis       Normal sinus rhythm  Normal ECG  When compared with ECG of 2021 14:51,  No significant change was found     CBC WITH AUTOMATED DIFF    Collection Time: 21  4:51 PM   Result Value Ref Range    WBC 5.1 4.1 - 11.1 K/uL    RBC 4.49 4. 10 - 5.70 M/uL    HGB 12.1 12.1 - 17.0 g/dL    HCT 38.2 36.6 - 50.3 %    MCV 85.1 80.0 - 99.0 FL    MCH 26.9 26.0 - 34.0 PG    MCHC 31.7 30.0 - 36.5 g/dL    RDW 14.0 11.5 - 14.5 %    PLATELET 521 308 - 799 K/uL    MPV 9.0 8.9 - 12.9 FL    NRBC 0.0 0  WBC    ABSOLUTE NRBC 0.00 0.00 - 0.01 K/uL    NEUTROPHILS 67 32 - 75 %    LYMPHOCYTES 15 12 - 49 %    MONOCYTES 11 5 - 13 %    EOSINOPHILS 6 0 - 7 %    BASOPHILS 1 0 - 1 %    IMMATURE GRANULOCYTES 0 0.0 - 0.5 %    ABS. NEUTROPHILS 3.3 1.8 - 8.0 K/UL    ABS. LYMPHOCYTES 0.8 0.8 - 3.5 K/UL    ABS. MONOCYTES 0.6 0.0 - 1.0 K/UL    ABS. EOSINOPHILS 0.3 0.0 - 0.4 K/UL    ABS. BASOPHILS 0.1 0.0 - 0.1 K/UL    ABS. IMM. GRANS. 0.0 0.00 - 0.04 K/UL    DF SMEAR SCANNED      PLATELET COMMENTS Large Platelets      RBC COMMENTS NORMOCYTIC, NORMOCHROMIC      WBC COMMENTS ATYPICAL LYMPHOCYTES PRESENT     METABOLIC PANEL, COMPREHENSIVE    Collection Time: 07/05/21  4:51 PM   Result Value Ref Range    Sodium 137 136 - 145 mmol/L    Potassium 4.2 3.5 - 5.1 mmol/L    Chloride 102 97 - 108 mmol/L    CO2 31 21 - 32 mmol/L    Anion gap 4 (L) 5 - 15 mmol/L    Glucose 90 65 - 100 mg/dL    BUN 20 6 - 20 MG/DL    Creatinine 1.19 0.70 - 1.30 MG/DL    BUN/Creatinine ratio 17 12 - 20      GFR est AA >60 >60 ml/min/1.73m2    GFR est non-AA >60 >60 ml/min/1.73m2    Calcium 9.3 8.5 - 10.1 MG/DL    Bilirubin, total 0.4 0.2 - 1.0 MG/DL    ALT (SGPT) 23 12 - 78 U/L    AST (SGOT) 15 15 - 37 U/L    Alk.  phosphatase 100 45 - 117 U/L    Protein, total 7.8 6.4 - 8.2 g/dL    Albumin 3.6 3.5 - 5.0 g/dL    Globulin 4.2 (H) 2.0 - 4.0 g/dL    A-G Ratio 0.9 (L) 1.1 - 2.2     CK W/ REFLX CKMB    Collection Time: 07/05/21  4:51 PM   Result Value Ref Range    CK 88 39 - 308 U/L   TROPONIN I    Collection Time: 07/05/21  4:51 PM   Result Value Ref Range    Troponin-I, Qt. <0.05 <0.05 ng/mL   NT-PRO BNP    Collection Time: 07/05/21  4:51 PM   Result Value Ref Range    NT pro-BNP 45 <988 PG/ML   METABOLIC PANEL, COMPREHENSIVE    Collection Time: 07/06/21  4:04 AM   Result Value Ref Range    Sodium 140 136 - 145 mmol/L    Potassium 4.1 3.5 - 5.1 mmol/L    Chloride 108 97 - 108 mmol/L    CO2 29 21 - 32 mmol/L    Anion gap 3 (L) 5 - 15 mmol/L    Glucose 76 65 - 100 mg/dL    BUN 18 6 - 20 MG/DL    Creatinine 1.00 0.70 - 1.30 MG/DL    BUN/Creatinine ratio 18 12 - 20      GFR est AA >60 >60 ml/min/1.73m2    GFR est non-AA >60 >60 ml/min/1.73m2    Calcium 8.9 8.5 - 10.1 MG/DL    Bilirubin, total 0.4 0.2 - 1.0 MG/DL    ALT (SGPT) 20 12 - 78 U/L    AST (SGOT) 13 (L) 15 - 37 U/L    Alk.  phosphatase 85 45 - 117 U/L    Protein, total 6.7 6.4 - 8.2 g/dL    Albumin 3.0 (L) 3.5 - 5.0 g/dL    Globulin 3.7 2.0 - 4.0 g/dL    A-G Ratio 0.8 (L) 1.1 - 2.2     PROTHROMBIN TIME + INR    Collection Time: 07/06/21  4:04 AM   Result Value Ref Range    INR 1.0 0.9 - 1.1      Prothrombin time 10.2 9.0 - 11.1 sec   URINALYSIS W/ REFLEX CULTURE    Collection Time: 07/06/21  4:24 AM    Specimen: Urine   Result Value Ref Range    Color YELLOW/STRAW      Appearance CLEAR CLEAR      Specific gravity 1.019 1.003 - 1.030      pH (UA) 6.0 5.0 - 8.0      Protein Negative NEG mg/dL    Glucose Negative NEG mg/dL    Ketone Negative NEG mg/dL    Bilirubin Negative NEG      Blood Negative NEG      Urobilinogen 0.2 0.2 - 1.0 EU/dL    Nitrites Negative NEG      Leukocyte Esterase Negative NEG      WBC 0-4 0 - 4 /hpf    RBC 0-5 0 - 5 /hpf    Epithelial cells FEW FEW /lpf    Bacteria Negative NEG /hpf    UA:UC IF INDICATED CULTURE NOT INDICATED BY UA RESULT CNI         Imaging:  I have personally reviewed the patients radiographs and have reviewed the reports:  Marked emphysema, interstitial changes, traction bronchiectasis, calcified lymphadenopathy, and upper lobe pulmonary nodule        George Alonzo MD

## 2021-07-06 NOTE — PROGRESS NOTES
End of Shift Note    Bedside shift change report given to Tamara Medina (oncoming nurse) by Wen Perkins (offgoing nurse). Report included the following information SBAR, Kardex, Intake/Output and MAR    Shift worked:  Night     Shift summary and any significant changes:     UA sent to lab. Pulmonology consult for today. C3-C7 ACDF scheduled for today     Concerns for physician to address:       Zone phone for oncoming shift:   [phone]       Activity:  Activity Level: Up with Assistance  Number times ambulated in hallways past shift: 0  Number of times OOB to chair past shift: 0    Cardiac:   Cardiac Monitoring: No      Cardiac Rhythm: Sinus Rhythm    Access:   Current line(s): PIV     Genitourinary:   Urinary status: voiding    Respiratory:   O2 Device: None (Room air)  Chronic home O2 use?: NO  Incentive spirometer at bedside: NO     GI:     Current diet:  DIET NPO  Passing flatus: YES  Tolerating current diet: YES       Pain Management:   Patient states pain is manageable on current regimen: YES    Skin:  Bryon Score: 22  Interventions: increase time out of bed    Patient Safety:  Fall Score:  Total Score: 2  Interventions: assistive device (walker, cane, etc), gripper socks and pt to call before getting OOB       Length of Stay:  Expected LOS: - - -  Actual LOS: [facility]

## 2021-07-06 NOTE — PROGRESS NOTES
TRANSFER - IN REPORT:    Verbal report received from Sanjiv Montalvo RN(name) on Anna Liz  being received from ED(unit) for routine progression of care      Report consisted of patients Situation, Background, Assessment and   Recommendations(SBAR). Information from the following report(s) SBAR, Kardex, ED Summary and MAR was reviewed with the receiving nurse. Opportunity for questions and clarification was provided. Assessment completed upon patients arrival to unit and care assumed.

## 2021-07-06 NOTE — PERIOP NOTES
Handoff Report from Operating Room to PACU    Report received from [address] [facility]  and  [address] regarding [facility]. Surgeon(s):  Tatiana Rothman MD  And Procedure(s) (LRB):  CERVICAL 3 TO CERVICAL 7 ANTERIOR CERVICAL DISCECTOMY AND FUSION (N/A)  confirmed   with allergies and dressings discussed. Anesthesia type, drugs, patient history, complications, estimated blood loss, vital signs, intake and output, and last pain medication, lines, reversal medications and temperature were reviewed. 2035- TRANSFER - OUT REPORT:    Verbal report given to Anastasia aWlsh RN(name) on [facility]  being transferred to [facility](unit) for routine progression of care       Report consisted of patients Situation, Background, Assessment and   Recommendations(SBAR). Information from the following report(s) OR Summary, Procedure Summary, Intake/Output and MAR was reviewed with the receiving nurse. Lines:   Peripheral IV 07/06/21 Anterior; Left Hand (Active)   Site Assessment Clean, dry, & intact 07/06/21 2023   Phlebitis Assessment 0 07/06/21 2023   Infiltration Assessment 0 07/06/21 2023   Dressing Status Clean, dry, & intact 07/06/21 2023   Dressing Type Tape;Transparent 07/06/21 2023   Hub Color/Line Status Blue; Infusing 07/06/21 2023       Peripheral IV 07/06/21 Wrist (Active)   Site Assessment Clean, dry, & intact 07/06/21 2023   Phlebitis Assessment 0 07/06/21 2023   Infiltration Assessment 0 07/06/21 2023   Dressing Status Clean, dry, & intact 07/06/21 2023   Dressing Type Tape;Transparent 07/06/21 2023   Hub Color/Line Status Pink;Capped 07/06/21 2023        Opportunity for questions and clarification was provided.       Patient transported with:   O2 @ 3l liters  Registered Nurse   Cell phone  Dentures  Patient sister updated on status

## 2021-07-06 NOTE — PERIOP NOTES
No recent covid test results, Denies S/S. + vaccinated  Belongings to PACU, 1 small clear bag with 2 items. Sister via cell phone.   Bed in low position, call bell in reach, side rail up x 4

## 2021-07-06 NOTE — CONSULTS
Asked to Admit for cervical laminectomy and fusion  [place]  Npo after midnight  Needs pulmonary consult -chronic pulmonary issues    Npo after midnight

## 2021-07-06 NOTE — ANESTHESIA PREPROCEDURE EVALUATION
Relevant Problems   NEUROLOGY   (+) Alcohol dependence (HCC)   (+) Depression   (+) Major depressive disorder, recurrent episode, severe (HCC)       Anesthetic History               Review of Systems / Medical History  Patient summary reviewed, nursing notes reviewed and pertinent labs reviewed    Pulmonary    COPD: severe    Sleep apnea: No treatment        Comments: Home O2 3L with exertion   Neuro/Psych     seizures  CVA  Psychiatric history    Comments: H/O ETOH abuse (F10.11)   Spinal stenosis of lumbar region   Drug use: Marijuana, Cocaine  Cardiovascular    Hypertension      CHF        Exercise tolerance: <4 METS  Comments:  6/3/19 \"Negative for ischemia. Preserved LV function. EF 65%. No wall motion abnormalities. \" per Dr Marcia Chavez notes 6/3/19.      GI/Hepatic/Renal     GERD           Endo/Other        Arthritis     Other Findings            Physical Exam    Airway  Mallampati: I  TM Distance: > 6 cm  Neck ROM: normal range of motion   Mouth opening: Normal     Cardiovascular    Rhythm: regular  Rate: abnormal        Comments: tachycardia Dental    Dentition: Poor dentition and Upper partial plate     Pulmonary  Breath sounds clear to auscultation               Abdominal  GI exam deferred       Other Findings            Anesthetic Plan    ASA: 3  Anesthesia type: general          Induction: Intravenous  Anesthetic plan and risks discussed with: Patient      Took Bblocker this AM

## 2021-07-06 NOTE — PROGRESS NOTES
Transition of Care Plan:    RUR: 12%  Disposition: SNF vs.IPR (pt could benefit from PT/OT evals to recommend disposition)  Follow up appointments: tbd  DME needed: Pt has a cane. Transportation at Discharge: Pt uses Medicaid transport. Keys or means to access home:   yes  IM Medicare letter: n/a  Caregiver Contact: Nallely Duncan [phone]  Discharge Caregiver contacted prior to discharge? CM will contact at d/c if pt desires. Reason for Admission:  Cervical Spinal Stenosis                     RUR Score:          12%           Plan for utilizing home health: If needed       PCP: First and Last name:  Taye Peralta MD     Name of Practice:    Are you a current patient: Yes/No: yes   Approximate date of last visit: 2 wks ago   Can you participate in a virtual visit with your PCP: prefers in office                    Current Advanced Directive/Advance Care Plan: Full Code      Fi[address] (ACP) Conversation      Date of Conversation:7/6/21  Conducted with: Patient with Decision Making Capacity    Healthcare Decision Maker:     Click here to complete [address] including selection of the [address] Relationship (ie \"Primary\")    Content/Action Overview:   DECLINED ACP conversation - will revisit periodically   Reviewed DNR/DNI and patient elects Full Code (Attempt Resuscitation)    Length of Voluntary ACP Conversation in minutes:  <16 minutes (Non-Billable)    Migel Gorman                        Transition of Care Plan:     SNF vs IPR    CM met with pt at bedside to introduce self/role, verify demographics, insurance and PCP. CM also discussed d/c plan. Pt is a 61 yo, AA, single male who was admitted to [facility] on 7/5/21 with a dx of Cervical Spinal Stenosis. Pt sees his PCP every 2 months in office. Pt uses the [facility]. Pt lives alone in a mobile home with 5 CLARA. Pt reported he does not have any family in the area. Pt does have a caregiver through [facility] that comes every day to assist with cleaning, cooking, etc.  Pt reported that he can complete his ADLs independently. Pt stated he does drive. Pt reported he does drive. Pt stated he uses a cane for ambulation. Pt will use Medicaid transport at d/c. Pt requesting to go [facility]. CM will continue to assess. Care Management Interventions  PCP Verified by CM: Yes (Dr. Valeria Lawrence)  Palliative Care Criteria Met (RRAT>21 & CHF Dx)?: No  Mode of Transport at Discharge:  Other (see comment) (Medicaid transport)  Transition of Care Consult (CM Consult): Discharge Planning  Discharge Durable Medical Equipment: No (Pt has a cane)  Physical Therapy Consult: No  Occupational Therapy Consult: No  Speech Therapy Consult: No  Current Support Network: Own Home (Pt lives in a mobile home with 5 CLARA.)  Confirm Follow Up Transport: Other (see comment) (Medicaid transportation)  The Patient and/or Patient Representative was Provided with a Choice of Provider and Agrees with the Discharge Plan?: Yes  Name of the Patient Representative Who was Provided with a Choice of Provider and Agrees with the Discharge Plan: Didi Kaplan  Discharge Location  Discharge Placement: Unable to determine at this time (SNF vs IPR)    Rey Gilbert,   Care Management, [facility]  [phone]

## 2021-07-06 NOTE — H&P
Subjective:     Patient ID: Karissa Chiang is a 62 y.o. male. Admitted yesteday through ER due to abnormal CXR and pulmonary workup. Has cervical stenosis. Patient Active Problem List    Diagnosis Date Noted    Cervical spinal stenosis 2021    Spinal stenosis of lumbar region with neurogenic claudication 2020    Spinal stenosis of lumbar region 2019    MRSA carrier 2019    Elevated hemoglobin A1c 2019    Major depressive disorder, recurrent episode, severe ([address] 08/15/2018    Alcohol dependence ([address] 08/15/2018    Depression 08/15/2018       Family History   Problem Relation Age of Onset    Hypertension Mother     Cancer Mother         BRAIN    Heart Attack Mother     Heart Attack Father     Hypertension Father     Hypertension Sister     Asthma Sister     Cancer Brother         COLON    Hypertension Brother     Anesth Problems Neg Hx         Social History     Tobacco Use    Smoking status: Former Smoker     Packs/day: 1.00     Years: 30.00     Pack years: 30.00     Quit date:      Years since quittin.5    Smokeless tobacco: Never Used   Substance Use Topics    Alcohol use: Yes     Comment: \"a beer every other day, liquor on special occassions\"       Past Medical History:   Diagnosis Date    Anxiety     Arthritis     Bullous emphysema (HCC)     Chronic obstructive pulmonary disease (HCC)     O2 2L sitting - 3L walking -- 24hr/day    Chronic pain     BACK AND LEGS    Elevated hemoglobin A1c 2019    GERD (gastroesophageal reflux disease)     H/O cardiovascular stress test 2019    6/3/19 \"Negative for ischemia. Preserved LV function. EF 65%. No wall motion abnormalities. \" per Dr Bill Lawrence notes 6/3/19.    H/O ETOH abuse     Hypertension     MRSA carrier 2019    On home oxygen therapy     uses 3L with activity    Opioid dependence ([address]     per PCP clearance     Pneumothorax  & 2010    right & left    Psychiatric disorder     DEPRESSION    Seiz[facility] 2014    Sleep apnea 2020    not currently using CPAP     Stroke ([facility] 2015    \"mini-stroke'        Past Surgical History:   Procedure Laterality Date    HX HERNIA REPAIR Right 2015    HX HERNIA REPAIR Left 2015    HX OTHER SURGICAL  2009 & 2010    CHEST TUBE WITH COLLAPSED LUNGS    HX ROTATOR CUFF REPAIR Right 2016    HX ROTATOR CUFF REPAIR Left 2016          Current Facility-Administered Medications:     oxyCODONE IR (ROXICODONE) tablet 5 mg, 5 mg, Oral, NOW, Padmini Shook MD    albuterol-ipratropium (DUO-NEB) 2.5 MG-0.5 MG/3 ML, 3 mL, Nebulization, Q6H PRN, Cesar Koroma PA-C    atorvastatin (LIPITOR) tablet 40 mg, 40 mg, Oral, QHS, Cesar Koroma PA-C, 40 mg at 07/05/21 2244    baclofen (LIORESAL) tablet 10 mg, 10 mg, Oral, TID, Cesar Koroma PA-C, 10 mg at 07/06/21 0031    arformoterol 15 mcg/budesonide 0.5 mg neb solution, , Nebulization, BID, Cesar Koroma PA-C, Given at 07/05/21 2120    busPIRone (BUSPAR) tablet 15 mg, 15 mg, Oral, TID, Cesar Koroma PA-C, 15 mg at 07/05/21 2243    cyclobenzaprine (FLEXERIL) tablet 10 mg, 10 mg, Oral, DAILY, Cesar Koroma PA-C    diazePAM (VALIUM) tablet 10 mg, 10 mg, Oral, Q6H PRN, Cesar Koroma PA-C    DULoxetine (CYMBALTA) capsule 120 mg, 120 mg, Oral, DAILY, Cesar Koroma PA-C    fluticasone propionate (FLONASE) 50 mcg/actuation nasal spray 2 Spray, 2 Spray, Both Nostrils, DAILY, Cesar Koroma PA-C    ipratropium (ATROVENT) 42 mcg (0.06 %) nasal spray 1 Spray, 1 Spray, Both Nostrils, TID, Cesar Koroma PA-C, 1 Spray at 07/06/21 0031    metoprolol succinate (TOPROL-XL) XL tablet 25 mg, 25 mg, Oral, DAILY, Cesar Koroma PA-C    montelukast (SINGULAIR) tablet 10 mg, 10 mg, Oral, QHS, Cesar Koroma PA-C, 10 mg at 07/05/21 2244    naltrexone (DEPADE) tablet 50 mg, 50 mg, Oral, DAILY, Cesar Koroma PA-C    risperiDONE (RisperDAL) tablet 0.5 mg, 0.5 mg, Oral, TID, Cesar Koroma PA-C, 0.5 mg at 07/05/21 2244    traZODone (DESYREL) tablet 100 mg, 100 mg, Oral, QHS, Cesar Koroma PA-C, 100 mg at 07/06/21 0030    0.9% sodium chloride infusion, 75 mL/hr, IntraVENous, CONTINUOUS, Cesar Koroma PA-C, Last Rate: 75 mL/hr at 07/05/21 2255, 75 mL/hr at 07/05/21 2255    sodium chloride (NS) flush 5-40 mL, 5-40 mL, IntraVENous, Q8H, Cesar Koroma PA-C, 10 mL at 07/06/21 6414    sodium chloride (NS) flush 5-40 mL, 5-40 mL, IntraVENous, PRN, Cesar Koroma PA-C    acetaminophen (TYLENOL) tablet 650 mg, 650 mg, Oral, Q6H, Cesar Koroma PA-C, 650 mg at 07/06/21 0403    oxyCODONE IR (ROXICODONE) tablet 2.5 mg, 2.5 mg, Oral, Q4H PRN, Cesar Koroma PA-C    oxyCODONE IR (ROXICODONE) tablet 5 mg, 5 mg, Oral, Q4H PRN, Cesar Koroma PA-C, 5 mg at 07/06/21 4430    naloxone (NARCAN) injection 0.4 mg, 0.4 mg, IntraVENous, PRN, Cesar Koroma PA-C    senna-docusate (PERICOLACE) 8.6-50 mg per tablet 2 Tablet, 2 Tablet, Oral, BID, Cesar Koroma PA-C, 2 Tablet at 07/05/21 2243    ceFAZolin (ANCEF) 2 g in sterile water (preservative free) 20 mL IV syringe, 2 g, IntraVENous, ON CALL TO OR, Cesar Koroma PA-C    ondansetron (ZOFRAN ODT) tablet 4 mg, 4 mg, Oral, Q6H PRN, Tatiana Rothman MD    gabapentin (NEURONTIN) capsule 600 mg, 600 mg, Oral, TID, 600 mg at 07/05/21 2243 **AND** gabapentin (NEURONTIN) capsule 200 mg, 200 mg, Oral, TID, Devendra ARTHUR MD, 200 mg at 07/05/21 2801    Allergies   Allergen Reactions    Chantix [Varenicline] Seizures        ROS:   No new bowel or bladder incontinence. No fevers or chills. No saddle anesthesia. Objective:     Visit Vitals  BP (!) 141/87   Pulse 92   Temp 97.6 °F (36.4 °C)   Resp 18   Ht 5' 11\" (1.803 m)   Wt 97.5 kg (215 lb)   SpO2 94%   BMI 29.99 kg/m²       Body mass index is 29.99 kg/m². , a BMI over 30 is considered obese and a BMI over 40 has been associated with a higher risk of surgical complications.     Constitutional: No acute distress. Well nourished. HEENT: Normocephalic. Respiratory:  No labored breathing. Cardiovascular:  No marked cyanosis. Skin:  No marked skin ulcers/lesions on bilateral upper or lower extremities. Psychiatric: Alert and oriented x3. Inspection: No gross deformity of bilateral upper or lower extremities. Musculoskeletal/Neurological:         Radiographs:       XR CHEST PA LAT    Result Date: 6/28/2021  Exam:  2 view chest Indication: Preoperative exam. COMPARISON: 8/10/2020 PA and lateral views demonstrate normal heart size. The calcified mediastinal and hilar lymph nodes are noted and unchanged. The lungs are now abnormal. There is numerous ill-defined parenchymal opacities in the mid and upper lung zones bilaterally right greater than left that have developed in the interval. These areas of abnormality are somewhat nodular measuring approximately 1 cm in size with very ill-defined margins. These findings could be due to infectious or inflammatory process. Neoplastic process is a consideration. Sarcoidosis is a consideration. The blunting left costophrenic angle laterally is stable. On the lateral exam there is some volume loss posteriorly in the upper lobes and this is unchanged. Osseous structures are unremarkable. Interval development of bilateral and upper lung zone ill-defined parenchymal opacities right greater than left. CT of the chest. High-resolution images is suggested. This could be due to infectious or inflammatory process. Sarcoidosis is a consideration. Malignancy is not excluded. 23X     CT CHEST W CONT    Result Date: 7/5/2021  INDICATION: Eval for lesions post CXR results COMPARISON: Evaluate for lesions after chest x-ray results. TECHNIQUE:  Following the uneventful intravenous administration of 100 cc Isovue-300, 5 mm axial images were obtained through the thorax. 1.2 mm axial images were performed every centimeter using high-resolution algorithm.  Inspiratory, expiratory, and prone imaging was performed. Coronal and sagittal reformats were generated. CT dose reduction was achieved through use of a standardized protocol tailored for this examination and automatic exposure control for dose modulation. FINDINGS: CHEST WALL: No mass or axillary lymphadenopathy. THYROID: No nodule. MEDIASTINUM: Multiple calcified mediastinal lymph nodes. PEDRO PABLO: No mass or lymphadenopathy. THORACIC AORTA: No dissection or aneurysm. MAIN PULMONARY ARTERY: Normal in caliber. TRACHEA/BRONCHI: Patent. ESOPHAGUS: No wall thickening or dilatation. HEART: Normal in size. PLEURA: No effusion or pneumothorax. LUNGS: There is parenchymal scarring in the lungs bilaterally. There is significant scarring is seen within the right upper lobe. There is traction bronchiectasis. Architectural distortion is seen, particularly in the right upper lobe and superior segments of the upper lobes bilaterally There are no perilymphatic nodules. There is centrilobular emphysema. There is no gas trapping. 9.3 mm spiculated left upper lobe pulmonary nodule with internal central calcification. INCIDENTALLY IMAGED UPPER ABDOMEN: Left nephrolithiasis. Calcified retroperitoneal lymph nodes. BONES: No destructive bone lesion. 1. Bilateral parenchymal scarring and traction bronchiectasis without evidence of honeycombing. Given the upper lobe predominance of findings and multiple calcified lymph nodes, this may be due to sarcoidosis. 2. 9.3 mm left upper lobe pulmonary nodule with internal central calcification, likely a granuloma in this patient with multiple calcified mediastinal lymph nodes. This could be followed up with CT scan. 3. Emphysema. XR CHEST PORT    Result Date: 7/5/2021  EXAM: XR CHEST PORT HISTORY: Shortness of breath. COMPARISON: 6/28/2021 FINDINGS: Single view(s) of the chest. The lungs are well inflated. There is unchanged irregularity in the interstitium. No focal consolidation.  There is unchanged scarring in the left lateral costophrenic angle. No pneumothorax. The patient is status post bilateral shoulder arthroplasties. No significant interval change. Assessment:     1. Chest tightness            Plan:     I have discussed the C3-7 ACDF in detail with Shelba Baumgarten and mentioned complications, including but not limited to: death, permanent disability, heart attack, stroke, lung injury or infection, blindness, ileus, bladder or bowel problems, ureter injury, bleeding, nerve injury (including numbness, pain and weakness), paralysis (which may be permanent), failure to heal, failure to fuse bone together in fusion procedures, failure to relief symptoms, failure to relief pain, increased pain, need for further surgeries, failure or breakage or hardware, malpositioning of hardware, need to fuse or operate on additional levels determined either during or after surgery, destabilization of the spine (which may require fusion or later surgery), infections (which may or may not require additional surgery), dural tears (tears of the sac holding in nerves and spinal fluid), meningitis, voice changes, vocal cord injury, hoarseness, blood clots, pulmonary embolus, Alex syndrome, recurrent disc herniation, diaphragm paralysis, and anesthetic complications. Comorbidities such as obesity, smoking, rheumatoid arthritis, chronic steroid use and diabetes increase these risks. Shelba Baumgarten understands and wants to proceed.              Jean Pierre Brown MD

## 2021-07-06 NOTE — ANESTHESIA POSTPROCEDURE EVALUATION
Procedure(s):  CERVICAL 3 TO CERVICAL 7 ANTERIOR CERVICAL DISCECTOMY AND FUSION. general    Anesthesia Post Evaluation        Patient location during evaluation: PACU  Note status: Adequate. Level of consciousness: responsive to verbal stimuli and sleepy but conscious  Pain management: satisfactory to patient  Airway patency: patent  Anesthetic complications: no  Cardiovascular status: acceptable  Respiratory status: acceptable  Hydration status: acceptable  Comments: +Post-Anesthesia Evaluation and Assessment    Patient: Santosh Brooks MRN: [id number]  SSN: [id number]   YOB: 1960  Age: 62 y.o. Sex: male      Cardiovascular Function/Vital Signs    /83   Pulse (!) 104   Temp 37 °C (98.6 °F)   Resp 15   Ht 5' 11\" (1.803 m)   Wt 97.5 kg (215 lb)   SpO2 100%   BMI 29.99 kg/m²     Patient is status post Procedure(s):  CERVICAL 3 TO CERVICAL 7 ANTERIOR CERVICAL DISCECTOMY AND FUSION. Nausea/Vomiting: Controlled. Postoperative hydration reviewed and adequate. Pain:  Pain Scale 1: Numeric (0 - 10) (07/06/21 1850)  Pain Intensity 1: 9 (07/06/21 1850)   Managed. Neurological Status:   Neuro (WDL): Exceptions to WDL (07/06/21 1810)   At baseline. Mental Status and Level of Consciousness: Arousable. Pulmonary Status:   O2 Device: Nasal cannula (07/06/21 1850)   Adequate oxygenation and airway patent. Complications related to anesthesia: None    Post-anesthesia assessment completed. No concerns. Signed By: Dacia Recio MD    7/6/2021  Post anesthesia nausea and vomiting:  controlled      INITIAL Post-op Vital signs:   Vitals Value Taken Time   /83 07/06/21 1845   Temp 37 °C (98.6 °F) 07/06/21 1812   Pulse 106 07/06/21 1858   Resp 20 07/06/21 1858   SpO2 100 % 07/06/21 1858   Vitals shown include unvalidated device data.

## 2021-07-06 NOTE — ED NOTES
Patient is being transferred to [facility], Room # 545 [id number]. Report given to Kelsey Morris on Jon Crowe for routine progression of care. Report consisted of the following information SBAR, Kardex and ED Summary. Patient transferred to receiving unit by: tech (RN or tech name). Outstanding consults needed: No     Next labs due: Yes    The following personal items will be sent with the patient during transfer to the floor: All valuables:    Cardiac monitoring ordered: No     The following CURRENT information was reported to the receiving RN:    Code status: Full Code at time of transfer    Last set of vital signs:  Vital Signs  Level of Consciousness: Alert (0) (07/05/21 1439)  Temp: 97 °F (36.1 °C) (07/05/21 1439)  Temp Source: Oral (07/05/21 1439)  Pulse (Heart Rate): 97 (07/05/21 2115)  Cardiac Rhythm: Sinus Rhythm (07/05/21 1651)  Resp Rate: 16 (07/05/21 2115)  BP: 128/89 (07/05/21 2115)  MAP (Monitor): 102 (07/05/21 2115)  MAP (Calculated): 102 (07/05/21 2115)  BP 1 Location: Left upper arm (07/05/21 1439)  BP 1 Method: Automatic (07/05/21 1439)  BP Patient Position: At rest (07/05/21 1439)  MEWS Score: 1 (07/05/21 1439)         Oxygen Therapy  O2 Sat (%): 95 % (07/05/21 2115)  Pulse via Oximetry: 97 beats per minute (07/05/21 2115)  O2 Device: None (Room air) (07/05/21 1658)      Last pain assessment:  Pain 1  Pain Scale 1: Numeric (0 - 10)  Pain Intensity 1: 10  Patient Stated Pain Goal: 0  Pain Location 1: Neck  Pain Description 1: Aching  Pain Intervention(s) 1: Medication (see MAR)      Wounds: No     Urinary catheter: voiding  Is there a johnson order: No     LDAs:       Peripheral IV 07/05/21 Left Antecubital (Active)         Opportunity for questions and clarification was provided.     Salud Peters

## 2021-07-06 NOTE — BRIEF OP NOTE
Brief Postoperative Note    Patient: Sari Mock  YOB: 1960  MRN: [id number]    Date of Procedure: 7/6/2021     Pre-Op Diagnosis: Cervical pain [M54.2]  Cervical spondylosis without myelopathy [M47.812]  Cervicalgia [M54.2]  Cervical radiculopathy [M54.12]  Spinal stenosis in cervical region [M48.02]  Cervical spondylosis with myelopathy [M47.12]  Foraminal stenosis of cervical region [M48.02]    Post-Op Diagnosis: Same as preoperative diagnosis. Procedure(s):  CERVICAL 3 TO CERVICAL 7 ANTERIOR CERVICAL DISCECTOMY AND FUSION    Surgeon(s):  Ruba Donahue MD    Surgical Assistant: Physician Assistant: DAPHNE Burgos    Anesthesia: Other     Estimated Blood Loss (mL): less than 50     Complications: None    Specimens: * No specimens in log *     Implants:   Implant Name Type Inv.  Item Serial No.  Lot No. LRB No. Used Action   2.5cc Osteo Amp select fibers Bone  [id number] [facility] [id number] N/A 1 Implanted   SCREW SPNL SD 4X16 MM [employer] - [id number]  SCREW SPNL SD 4X16 MM [employer] [id number] [employer]_ [id number] N/A 8 Implanted   Karla [employer] [id number]   [id number] [facility] [id number] N/A 2 Implanted   Karla [employer] [id number]   [id number] [facility] [id number] N/A 1 Implanted   Karla [employer] [id number]   [id number] [facility] [id number] N/A 1 Implanted       Drains: * No LDAs found *    Findings: Stenosis    Electronically Signed by Arnulfo Ortiz MD on 7/6/2021 at 5:44 PM

## 2021-07-06 NOTE — CONSULTS
Hospitalist Consultation Note    NAME:  Nisha Cole   :   1960   MRN:   [id number]     ATTENDING: Yuri Connelly MD  PCP:  Valdemar Barron MD    Date/Time:  2021 8:52 AM      Recommendations/Plan:       Active Problems:    Cervical spinal stenosis (2021)    Preop clearance  Pre-op cardiac risk assessment:  Pt evaluated using revised cardiac risk index and is felt to be moderate  cardiovascular risk for high risk surgery with  1-2.4 risk for major complications based on these criteria. This risk has been discussed with the patient and pt wishes to proceed. Patient will need pulmonary clearance before proceeding to surgery as  patient is at high risk of pulmonary complications following cervical laminectomy. He has chronic respiratory failure, severe emphysema and abnormal CT of the chest  Further risk reduction will involve medical management of other comorbid conditions in the perioperative period. Patient reported that he has had outpatient PFTs with his pulmonologist a month ago, he reported that it was within normal limit. Those records needs to be requested or PFTs need to be repeated. Will defer to pulmonologist    chronic respiratory failure with hypoxia on home oxygen  Emphysema  History of tobacco abuse  abNormal CT of the chest  TANIA not on CPAP  CT of the chest showed   1. Bilateral parenchymal scarring and traction bronchiectasis without evidence  of honeycombing. Given the upper lobe predominance of findings and multiple  calcified lymph nodes, this may be due to sarcoidosis. 2. 9.3 mm left upper lobe pulmonary nodule with internal central calcification,  likely a granuloma in this patient with multiple calcified mediastinal lymph  nodes. This could be followed up with CT scan. 3. Emphysema.   Continue with home inhalers with pharmacy substitution, continue with nebs  Continue with montelukast  Hypertension  Blood pressure controlled, continue with Toprol  BP within normal limit  History of seizure disorder  gerd  Patient reported that he had seizures as a side effect of Chantix  He has been on Keppra for the last 7 years, he supposed to take Keppra 1000 twice daily but he only takes it daily  We will continue as prescribed. Check Keppra level  Continue Protonix  Cervical stenosis  Management per surgery, reported pain which is not controlled by his oxycodone  Continue with Neurontin  Underlying psych disorder  Continue with risperidone, trazodone and Vistaril  History of alcohol and opiate dependence  Reported drinking only 4-5 beers a week, monitor for alcohol withdrawal  Code Status: Full  DVT Prophylaxis: Per surgery          Subjective:   REQUESTING PHYSICIAN:  REASON FOR CONSULT:    Medical management and preop clearance  Trudi Lee is a 62 y.o.  male with past medical history of chronic respiratory failure with hypoxia on home oxygen, emphysema, hypertension, tobacco abuse and seizure disorder who I was asked to see for preop clearance and medical management  . Patient is admitted for cervical stenosis surgery, had a CT of the chest which was concerning for emphysema and sarcoidosis. Patient denies any shortness of breath at rest, reported hypoxia when ambulating that is why he needed oxygen. He denies any chest pain, palpitation. Review of his vital signs showed his blood pressure was within normal limit, his labs are unremarkable  EKG is nonischemic  A1c 5.1          Past Medical History:   Diagnosis Date    Anxiety     Arthritis     Bullous emphysema (HCC)     Chronic obstructive pulmonary disease (HCC)     O2 2L sitting - 3L walking -- 24hr/day    Chronic pain     BACK AND LEGS    Elevated hemoglobin A1c 9/25/2019    GERD (gastroesophageal reflux disease)     H/O cardiovascular stress test 06/03/2019    6/3/19 \"Negative for ischemia. Preserved LV function. EF 65%. No wall motion abnormalities. \" per Dr Rhys Cockayne notes 6/3/19.   So Khan H/O ETOH abuse  Hypertension     MRSA carrier 2019    On home oxygen therapy     uses 3L with activity    Opioid dependence ([facility] [address]     per PCP clearance     Pneumothorax  & 2010    right & left    Psychiatric disorder     DEPRESSION    Seizures ([facility] [address]     Sleep apnea 2020    not currently using CPAP     Stroke ([facility] [address] 2015    \"mini-stroke'      Past Surgical History:   Procedure Laterality Date    HX HERNIA REPAIR Right 2015    HX HERNIA REPAIR Left 2015    HX OTHER SURGICAL   & 2010    CHEST TUBE WITH COLLAPSED LUNGS    HX ROTATOR CUFF REPAIR Right 2016    HX ROTATOR CUFF REPAIR Left 2016     Social History     Tobacco Use    Smoking status: Former Smoker     Packs/day: 1.00     Years: 30.00     Pack years: 30.00     Quit date:      Years since quittin.5    Smokeless tobacco: Never Used   Substance Use Topics    Alcohol use: Yes     Comment: \"a beer every other day, liquor on special occassions\"      Family History   Problem Relation Age of Onset    Hypertension Mother     Cancer Mother         BRAIN    Heart Attack Mother     Heart Attack Father     Hypertension Father     Hypertension Sister     Asthma Sister     Cancer Brother         COLON    Hypertension Brother     Anesth Problems Neg Hx        Allergies   Allergen Reactions    Chantix [Varenicline] Seizures      Prior to Admission medications    Medication Sig Start Date End Date Taking? Authorizing Provider   metoprolol succinate (TOPROL-XL) 25 mg XL tablet Take 25 mg by mouth daily. Yes Provider, Historical   hydrOXYzine HCL (ATARAX) 25 mg tablet Take  by mouth three (3) times daily as needed. Yes Provider, Historical   traZODone (DESYREL) 100 mg tablet Take 100 mg by mouth nightly. Take 2-3 tablets   Yes Provider, Historical   diclofenac potassium (CATAFLAM) 50 mg tablet Take 50 mg by mouth two (2) times daily as needed.    Yes Provider, Historical   DULoxetine (CYMBALTA) 60 mg capsule Take 120 mg by mouth daily. Yes Provider, Historical   risperiDONE (RisperDAL) 1 mg tablet Take 1 mg by mouth three (3) times daily. Yes Provider, Historical   cyanocobalamin (Vitamin B-12) 1,000 mcg tablet Take 1,000 mcg by mouth daily. Yes Provider, Historical   oxyCODONE IR (ROXICODONE) 5 mg immediate release tablet Take 7.5 mg by mouth three (3) times daily as needed for Pain. Yes Provider, Historical   acetaminophen (TYLENOL) 500 mg tablet TAKE 2 TABLETS BY MOUTH EVERY 6 HOURS AS NEEDED FOR MILD PAIN OR MODERATE PAIN FOR UP TO 10 DAYS 5/5/21  Yes Provider, Historical   Ear Drops 6.5 % otic solution INSTILL 5 DROPS INTO BOTH EARS 2 TIMES PER DAY FOR 5 DAYS 4/6/21  Yes Provider, Historical   cyclobenzaprine (FLEXERIL) 10 mg tablet Take 10 mg by mouth daily. 4/12/21  Yes Provider, Historical   diazePAM (VALIUM) 10 mg tablet Take 10 mg by mouth. 5/6/21  Yes Provider, Historical   ergocalciferol (ERGOCALCIFEROL) 1,250 mcg (50,000 unit) capsule TAKE 1 CAPSULE BY ORAL ROUTE WEEKLY FOR 90 DAYS 8/5/20  Yes Provider, Historical   fluticasone propionate (FLONASE) 50 mcg/actuation nasal spray INSTILL 2 SPRAYS IN THE NOSTRILS DAILY 8/5/20  Yes Provider, Historical   Mucinex 600 mg ER tablet 1,200 mg two (2) times a day. 3/17/21  Yes Provider, Historical   hydrOXYzine pamoate (VISTARIL) 50 mg capsule Take 50 mg by mouth. Yes Provider, Historical   ipratropium (ATROVENT) 42 mcg (0.06 %) nasal spray 1 [place] by Nasal route three (3) times daily. 1-2 squirts in the nostrils 8/5/20  Yes Provider, Historical   senna (Senexon) 8.6 mg tablet Take 2 Tabs by mouth two (2) times a day. Yes Provider, Historical   ferrous sulfate 325 mg (65 mg iron) tablet Take  by mouth Daily (before breakfast). Yes Provider, Historical   montelukast (SINGULAIR) 10 mg tablet Take 10 mg by mouth nightly. Yes Provider, Historical   pyridoxine, vitamin B6, (VITAMIN B-6) 50 mg tablet Take 50 mg by mouth two (2) times a day.    Yes Provider, Historical gabapentin (NEURONTIN) 800 mg tablet Take 800 mg by mouth three (3) times daily. Yes Provider, Historical   ondansetron hcl (ZOFRAN) 4 mg tablet Take 4 mg by mouth every six (6) hours as needed for Nausea. Yes Provider, Historical   albuterol-ipratropium (DUO-NEB) 2.5 mg-0.5 mg/3 ml nebu 3 mL by Nebulization route every six (6) hours as needed. Yes Provider, Historical   Oxygen Indications: 3 LITERS NC AT BEDTIME   Yes Provider, Historical   atorvastatin (LIPITOR) 40 mg tablet Take 40 mg by mouth nightly. Yes Provider, Historical   baclofen (LIORESAL) 10 mg tablet Take 10 mg by mouth three (3) times daily. Yes Provider, Historical   budesonide-formoterol (SYMBICORT) 80-4.5 mcg/actuation HFAA Take 2 Puffs by inhalation two (2) times a day. Indications: BRONCHOSPASM PREVENTION WITH COPD 8/27/18  Yes Supa Price MD   busPIRone (BUSPAR) 15 mg tablet Take 1 Tab by mouth three (3) times daily. Indications: Generalized Anxiety Disorder  Patient taking differently: Take 30 mg by mouth two (2) times a day. Indications: repeated episodes of anxiety 8/27/18  Yes Supa Price MD   levETIRAcetam (KEPPRA) 1,000 mg tablet Take 1 Tab by mouth two (2) times a day. Indications: PARTIAL EPILEPSY TREATMENT ADJUNCT 8/27/18  Yes Supa Price MD   naltrexone (DEPADE) 50 mg tablet Take 1 Tab by mouth daily. Indications: alcoholism 8/27/18  Yes Supa Price MD   pantoprazole (PROTONIX) 40 mg tablet Take 1 Tab by mouth Before breakfast and dinner. Patient taking differently: Take 40 mg by mouth daily. 8/27/18  Yes Supa Price MD   tiotropium (SPIRIVA) 18 mcg inhalation capsule Take 1 Cap by inhalation daily. Patient taking differently: Take 1 Capsule by inhalation two (2) times a day. 8/28/18  Yes Supa Price MD       REVIEW OF SYSTEMS:     Total of 12 systems reviewed as follows:   I am not able to complete the review of systems because:    The patient is intubated and sedated    The patient has altered mental status due to his acute medical problems    The patient has baseline aphasia from prior stroke(s)    The patient has baseline dementia and is not reliable historian                 POSITIVE= underlined text  Negative = text not underlined  General:  fever, chills, sweats, generalized weakness, weight loss/gain,      loss of appetite   Eyes:    blurred vision, eye pain, loss of vision, double vision  ENT:    rhinorrhea, pharyngitis   Respiratory:   cough, sputum production, SOB, wheezing, YOUNG, pleuritic pain   Cardiology:   chest pain, palpitations, orthopnea, PND, edema, syncope   Gastrointestinal:  abdominal pain , N/V, dysphagia, diarrhea, constipation, bleeding   Genitourinary:  frequency, urgency, dysuria, hematuria, incontinence   Muskuloskeletal :  arthralgia, myalgia   Hematology:  easy bruising, nose or gum bleeding, lymphadenopathy   Dermatological: rash, ulceration, pruritis   Endocrine:   hot flashes or polydipsia   Neurological:  headache, dizziness, confusion, focal weakness, paresthesia,     Speech difficulties, memory loss, gait disturbance  Psychological: Feelings of anxiety, depression, agitation    Objective:   VITALS:    Visit Vitals  BP (!) 141/87   Pulse 92   Temp 97.6 °F (36.4 °C)   Resp 18   Ht 5' 11\" (1.803 m)   Wt 97.5 kg (215 lb)   SpO2 100%   BMI 29.99 kg/m²     Temp (24hrs), Av.6 °F (36.4 °C), Min:97 °F (36.1 °C), Max:98.2 °F (36.8 °C)      PHYSICAL EXAM:   General:    Alert, cooperative, no distress, appears stated age. HEENT: Atraumatic, anicteric sclerae, pink conjunctivae     No oral ulcers, mucosa moist, throat clear  Neck:  Supple, symmetrical,  thyroid: non tender  Lungs:   Clear to auscultation bilaterally. No Wheezing or Rhonchi. No rales. Chest wall:  No tenderness  No Accessory muscle use. Heart:   Regular  rhythm,  No  murmur   No edema  Abdomen:   Soft, non-tender. Not distended. Bowel sounds normal  Extremities: No cyanosis.   No clubbing  Skin: Not pale. Not Jaundiced  No rashes   Psych:  Good insight. Not depressed. Not anxious or agitated. Neurologic: EOMs intact. No facial asymmetry. No aphasia or slurred speech. Symmetrical strength, Alert and oriented X 4.     _______________________________________________________________________  Care Plan discussed with:    Comments   Patient x    Family      RN x    Care Manager                    Consultant:      ____________________________________________________________________  TOTAL TIME:  65 mins    Comments    x Reviewed previous records   >50% of visit spent in counseling and coordination of care x Discussion with patient and/or family and questions answered       Critical Care Provided     Minutes non procedure based  ________________________________________________________________________  Signed: Daya Frias MD      Procedures: see electronic medical records for all procedures/Xrays and details which were not copied into this note but were reviewed prior to creation of Plan. LAB DATA REVIEWED:    Recent Results (from the past 24 hour(s))   EKG, 12 LEAD, INITIAL    Collection Time: 07/05/21  2:51 PM   Result Value Ref Range    Ventricular Rate 84 BPM    Atrial Rate 84 BPM    P-R Interval 162 ms    QRS Duration 86 ms    Q-T Interval 378 ms    QTC Calculation (Bezet) 446 ms    Calculated P Axis 55 degrees    Calculated R Axis 27 degrees    Calculated T Axis 41 degrees    Diagnosis       Normal sinus rhythm  Normal ECG  When compared with ECG of 28-JUN-2021 14:51,  No significant change was found     CBC WITH AUTOMATED DIFF    Collection Time: 07/05/21  4:51 PM   Result Value Ref Range    WBC 5.1 4.1 - 11.1 K/uL    RBC 4.49 4. 10 - 5.70 M/uL    HGB 12.1 12.1 - 17.0 g/dL    HCT 38.2 36.6 - 50.3 %    MCV 85.1 80.0 - 99.0 FL    MCH 26.9 26.0 - 34.0 PG    MCHC 31.7 30.0 - 36.5 g/dL    RDW 14.0 11.5 - 14.5 %    PLATELET 307 523 - 003 K/uL    MPV 9.0 8.9 - 12.9 FL    NRBC 0.0 0  WBC    ABSOLUTE NRBC 0.00 0.00 - 0.01 K/uL    NEUTROPHILS 67 32 - 75 %    LYMPHOCYTES 15 12 - 49 %    MONOCYTES 11 5 - 13 %    EOSINOPHILS 6 0 - 7 %    BASOPHILS 1 0 - 1 %    IMMATURE GRANULOCYTES 0 0.0 - 0.5 %    ABS. NEUTROPHILS 3.3 1.8 - 8.0 K/UL    ABS. LYMPHOCYTES 0.8 0.8 - 3.5 K/UL    ABS. MONOCYTES 0.6 0.0 - 1.0 K/UL    ABS. EOSINOPHILS 0.3 0.0 - 0.4 K/UL    ABS. BASOPHILS 0.1 0.0 - 0.1 K/UL    ABS. IMM. GRANS. 0.0 0.00 - 0.04 K/UL    DF SMEAR SCANNED      PLATELET COMMENTS Large Platelets      RBC COMMENTS NORMOCYTIC, NORMOCHROMIC      WBC COMMENTS ATYPICAL LYMPHOCYTES PRESENT     METABOLIC PANEL, COMPREHENSIVE    Collection Time: 07/05/21  4:51 PM   Result Value Ref Range    Sodium 137 136 - 145 mmol/L    Potassium 4.2 3.5 - 5.1 mmol/L    Chloride 102 97 - 108 mmol/L    CO2 31 21 - 32 mmol/L    Anion gap 4 (L) 5 - 15 mmol/L    Glucose 90 65 - 100 mg/dL    BUN 20 6 - 20 MG/DL    Creatinine 1.19 0.70 - 1.30 MG/DL    BUN/Creatinine ratio 17 12 - 20      GFR est AA >60 >60 ml/min/1.73m2    GFR est non-AA >60 >60 ml/min/1.73m2    Calcium 9.3 8.5 - 10.1 MG/DL    Bilirubin, total 0.4 0.2 - 1.0 MG/DL    ALT (SGPT) 23 12 - 78 U/L    AST (SGOT) 15 15 - 37 U/L    Alk.  phosphatase 100 45 - 117 U/L    Protein, total 7.8 6.4 - 8.2 g/dL    Albumin 3.6 3.5 - 5.0 g/dL    Globulin 4.2 (H) 2.0 - 4.0 g/dL    A-G Ratio 0.9 (L) 1.1 - 2.2     CK W/ REFLX CKMB    Collection Time: 07/05/21  4:51 PM   Result Value Ref Range    CK 88 39 - 308 U/L   TROPONIN I    Collection Time: 07/05/21  4:51 PM   Result Value Ref Range    Troponin-I, Qt. <0.05 <0.05 ng/mL   NT-PRO BNP    Collection Time: 07/05/21  4:51 PM   Result Value Ref Range    NT pro-BNP 45 <006 PG/ML   METABOLIC PANEL, COMPREHENSIVE    Collection Time: 07/06/21  4:04 AM   Result Value Ref Range    Sodium 140 136 - 145 mmol/L    Potassium 4.1 3.5 - 5.1 mmol/L    Chloride 108 97 - 108 mmol/L    CO2 29 21 - 32 mmol/L    Anion gap 3 (L) 5 - 15 mmol/L    Glucose 76 65 - 100 mg/dL    BUN 18 6 - 20 MG/DL    Creatinine 1.00 0.70 - 1.30 MG/DL    BUN/Creatinine ratio 18 12 - 20      GFR est AA >60 >60 ml/min/1.73m2    GFR est non-AA >60 >60 ml/min/1.73m2    Calcium 8.9 8.5 - 10.1 MG/DL    Bilirubin, total 0.4 0.2 - 1.0 MG/DL    ALT (SGPT) 20 12 - 78 U/L    AST (SGOT) 13 (L) 15 - 37 U/L    Alk.  phosphatase 85 45 - 117 U/L    Protein, total 6.7 6.4 - 8.2 g/dL    Albumin 3.0 (L) 3.5 - 5.0 g/dL    Globulin 3.7 2.0 - 4.0 g/dL    A-G Ratio 0.8 (L) 1.1 - 2.2     PROTHROMBIN TIME + INR    Collection Time: 07/06/21  4:04 AM   Result Value Ref Range    INR 1.0 0.9 - 1.1      Prothrombin time 10.2 9.0 - 11.1 sec   URINALYSIS W/ REFLEX CULTURE    Collection Time: 07/06/21  4:24 AM    Specimen: Urine   Result Value Ref Range    Color YELLOW/STRAW      Appearance CLEAR CLEAR      Specific gravity 1.019 1.003 - 1.030      pH (UA) 6.0 5.0 - 8.0      Protein Negative NEG mg/dL    Glucose Negative NEG mg/dL    Ketone Negative NEG mg/dL    Bilirubin Negative NEG      Blood Negative NEG      Urobilinogen 0.2 0.2 - 1.0 EU/dL    Nitrites Negative NEG      Leukocyte Esterase Negative NEG      WBC 0-4 0 - 4 /hpf    RBC 0-5 0 - 5 /hpf    Epithelial cells FEW FEW /lpf    Bacteria Negative NEG /hpf    UA:UC IF INDICATED CULTURE NOT INDICATED BY UA RESULT CNI         _____________________________  Hospitalist: Angus Cespedes MD

## 2021-07-06 NOTE — PERIOP NOTES
Floseal 5mL applied topically to wound site by Dr Georgia Alba, exp 2023-03-22, lot [id number], ref [id number].

## 2021-07-06 NOTE — PERIOP NOTES
TRANSFER - IN REPORT:    Verbal report received from [facility] RN(name) on Sherri Arn  being received from Ortho Room 3223(unit) for ordered procedure      Report consisted of patients Situation, Background, Assessment and   Recommendations(SBAR). Information from the following report(s) SBAR, Kardex, Intake/Output, MAR, Recent Results, Med Rec Status and Procedure Verification was reviewed with the receiving nurse. Opportunity for questions and clarification was provided. Assessment completed upon patients arrival to unit and care assumed.

## 2021-07-06 NOTE — PROGRESS NOTES
Problem: Falls - Risk of  Goal: *Absence of Falls  Description: Document Cecelia Alina Fall Risk and appropriate interventions in the flowsheet. Outcome: Progressing Towards Goal  Note: Fall Risk Interventions:  Mobility Interventions: Patient to call before getting OOB         Medication Interventions: Evaluate medications/consider consulting pharmacy, Patient to call before getting OOB, Teach patient to arise slowly                   Problem: Patient Education: Go to Patient Education Activity  Goal: Patient/Family Education  Outcome: Progressing Towards Goal     Problem: Risk for Spread of Infection  Goal: Prevent transmission of infectious organism to others  Description: Prevent the transmission of infectious organisms to other patients, staff members, and visitors.   Outcome: Progressing Towards Goal     Problem: Patient Education:  Go to Education Activity  Goal: Patient/Family Education  Outcome: Progressing Towards Goal

## 2021-07-07 ENCOUNTER — APPOINTMENT (OUTPATIENT)
Dept: GENERAL RADIOLOGY | Age: 61
DRG: 321 | End: 2021-07-07
Attending: ORTHOPAEDIC SURGERY
Payer: MEDICAID

## 2021-07-07 LAB
ATRIAL RATE: 99 BPM
CALCULATED P AXIS, ECG09: 58 DEGREES
CALCULATED R AXIS, ECG10: 32 DEGREES
CALCULATED T AXIS, ECG11: 36 DEGREES
DIAGNOSIS, 93000: NORMAL
P-R INTERVAL, ECG05: 160 MS
Q-T INTERVAL, ECG07: 346 MS
QRS DURATION, ECG06: 78 MS
QTC CALCULATION (BEZET), ECG08: 444 MS
TROPONIN I SERPL-MCNC: <0.05 NG/ML
VENTRICULAR RATE, ECG03: 99 BPM

## 2021-07-07 PROCEDURE — 94760 N-INVAS EAR/PLS OXIMETRY 1: CPT

## 2021-07-07 PROCEDURE — 77010033678 HC OXYGEN DAILY

## 2021-07-07 PROCEDURE — 74011000250 HC RX REV CODE- 250: Performed by: NURSE PRACTITIONER

## 2021-07-07 PROCEDURE — 74011250636 HC RX REV CODE- 250/636: Performed by: ORTHOPAEDIC SURGERY

## 2021-07-07 PROCEDURE — 97116 GAIT TRAINING THERAPY: CPT

## 2021-07-07 PROCEDURE — 2709999900 HC NON-CHARGEABLE SUPPLY

## 2021-07-07 PROCEDURE — 74011250637 HC RX REV CODE- 250/637: Performed by: ORTHOPAEDIC SURGERY

## 2021-07-07 PROCEDURE — 74011000250 HC RX REV CODE- 250: Performed by: INTERNAL MEDICINE

## 2021-07-07 PROCEDURE — 97161 PT EVAL LOW COMPLEX 20 MIN: CPT

## 2021-07-07 PROCEDURE — 94640 AIRWAY INHALATION TREATMENT: CPT

## 2021-07-07 PROCEDURE — 65270000029 HC RM PRIVATE

## 2021-07-07 PROCEDURE — 74011250637 HC RX REV CODE- 250/637: Performed by: INTERNAL MEDICINE

## 2021-07-07 PROCEDURE — 97535 SELF CARE MNGMENT TRAINING: CPT | Performed by: OCCUPATIONAL THERAPIST

## 2021-07-07 PROCEDURE — 74011250636 HC RX REV CODE- 250/636: Performed by: INTERNAL MEDICINE

## 2021-07-07 PROCEDURE — 74011000250 HC RX REV CODE- 250: Performed by: ORTHOPAEDIC SURGERY

## 2021-07-07 PROCEDURE — 74011250637 HC RX REV CODE- 250/637: Performed by: NURSE PRACTITIONER

## 2021-07-07 PROCEDURE — 74011250636 HC RX REV CODE- 250/636

## 2021-07-07 PROCEDURE — 84484 ASSAY OF TROPONIN QUANT: CPT

## 2021-07-07 PROCEDURE — 72040 X-RAY EXAM NECK SPINE 2-3 VW: CPT

## 2021-07-07 PROCEDURE — 93005 ELECTROCARDIOGRAM TRACING: CPT

## 2021-07-07 PROCEDURE — 36415 COLL VENOUS BLD VENIPUNCTURE: CPT

## 2021-07-07 PROCEDURE — 97165 OT EVAL LOW COMPLEX 30 MIN: CPT | Performed by: OCCUPATIONAL THERAPIST

## 2021-07-07 RX ORDER — LISINOPRIL 10 MG/1
20 TABLET ORAL DAILY
COMMUNITY

## 2021-07-07 RX ORDER — MORPHINE SULFATE 2 MG/ML
2 INJECTION, SOLUTION INTRAMUSCULAR; INTRAVENOUS ONCE
Status: COMPLETED | OUTPATIENT
Start: 2021-07-07 | End: 2021-07-07

## 2021-07-07 RX ORDER — METOPROLOL SUCCINATE 25 MG/1
25 TABLET, EXTENDED RELEASE ORAL DAILY
Status: DISCONTINUED | OUTPATIENT
Start: 2021-07-07 | End: 2021-07-09

## 2021-07-07 RX ORDER — MONTELUKAST SODIUM 10 MG/1
10 TABLET ORAL
Status: DISCONTINUED | OUTPATIENT
Start: 2021-07-07 | End: 2021-07-14 | Stop reason: HOSPADM

## 2021-07-07 RX ORDER — LABETALOL HYDROCHLORIDE 5 MG/ML
10 INJECTION, SOLUTION INTRAVENOUS
Status: DISCONTINUED | OUTPATIENT
Start: 2021-07-07 | End: 2021-07-14 | Stop reason: HOSPADM

## 2021-07-07 RX ORDER — TIZANIDINE 4 MG/1
4 TABLET ORAL
COMMUNITY

## 2021-07-07 RX ORDER — LANOLIN ALCOHOL/MO/W.PET/CERES
100 CREAM (GRAM) TOPICAL DAILY
Status: DISCONTINUED | OUTPATIENT
Start: 2021-07-08 | End: 2021-07-07

## 2021-07-07 RX ORDER — LORAZEPAM 2 MG/ML
4 INJECTION INTRAMUSCULAR
Status: DISCONTINUED | OUTPATIENT
Start: 2021-07-07 | End: 2021-07-14 | Stop reason: HOSPADM

## 2021-07-07 RX ORDER — IPRATROPIUM BROMIDE 42 UG/1
1 SPRAY, METERED NASAL 3 TIMES DAILY
Status: DISCONTINUED | OUTPATIENT
Start: 2021-07-07 | End: 2021-07-14 | Stop reason: HOSPADM

## 2021-07-07 RX ORDER — MORPHINE SULFATE 2 MG/ML
INJECTION, SOLUTION INTRAMUSCULAR; INTRAVENOUS
Status: COMPLETED
Start: 2021-07-07 | End: 2021-07-07

## 2021-07-07 RX ORDER — FOLIC ACID 1 MG/1
1 TABLET ORAL DAILY
Status: DISCONTINUED | OUTPATIENT
Start: 2021-07-08 | End: 2021-07-14 | Stop reason: HOSPADM

## 2021-07-07 RX ORDER — FLUTICASONE PROPIONATE 50 MCG
2 SPRAY, SUSPENSION (ML) NASAL DAILY
Status: DISCONTINUED | OUTPATIENT
Start: 2021-07-08 | End: 2021-07-14 | Stop reason: HOSPADM

## 2021-07-07 RX ORDER — LORAZEPAM 2 MG/ML
2 INJECTION INTRAMUSCULAR
Status: DISCONTINUED | OUTPATIENT
Start: 2021-07-07 | End: 2021-07-14 | Stop reason: HOSPADM

## 2021-07-07 RX ORDER — LISINOPRIL 20 MG/1
20 TABLET ORAL DAILY
Status: DISCONTINUED | OUTPATIENT
Start: 2021-07-07 | End: 2021-07-14 | Stop reason: HOSPADM

## 2021-07-07 RX ORDER — DULOXETIN HYDROCHLORIDE 30 MG/1
120 CAPSULE, DELAYED RELEASE ORAL DAILY
Status: DISCONTINUED | OUTPATIENT
Start: 2021-07-08 | End: 2021-07-14 | Stop reason: HOSPADM

## 2021-07-07 RX ORDER — ASPIRIN 325 MG/1
100 TABLET, FILM COATED ORAL DAILY
Status: DISCONTINUED | OUTPATIENT
Start: 2021-07-08 | End: 2021-07-14 | Stop reason: HOSPADM

## 2021-07-07 RX ORDER — ATORVASTATIN CALCIUM 40 MG/1
40 TABLET, FILM COATED ORAL
Status: DISCONTINUED | OUTPATIENT
Start: 2021-07-07 | End: 2021-07-14 | Stop reason: HOSPADM

## 2021-07-07 RX ADMIN — Medication 10 ML: at 22:18

## 2021-07-07 RX ADMIN — OXYCODONE 10 MG: 5 TABLET ORAL at 08:32

## 2021-07-07 RX ADMIN — MONTELUKAST 10 MG: 10 TABLET, FILM COATED ORAL at 22:12

## 2021-07-07 RX ADMIN — OXYCODONE 10 MG: 5 TABLET ORAL at 04:18

## 2021-07-07 RX ADMIN — FLUTICASONE PROPIONATE 2 SPRAY: 50 SPRAY, METERED NASAL at 12:51

## 2021-07-07 RX ADMIN — ATORVASTATIN CALCIUM 40 MG: 40 TABLET, FILM COATED ORAL at 20:38

## 2021-07-07 RX ADMIN — Medication 10 ML: at 06:34

## 2021-07-07 RX ADMIN — ACETAMINOPHEN 1000 MG: 500 TABLET ORAL at 06:33

## 2021-07-07 RX ADMIN — ACETAMINOPHEN 1000 MG: 500 TABLET ORAL at 01:00

## 2021-07-07 RX ADMIN — CEFAZOLIN SODIUM 2 G: 1 INJECTION, POWDER, FOR SOLUTION INTRAMUSCULAR; INTRAVENOUS at 08:32

## 2021-07-07 RX ADMIN — FAMOTIDINE 20 MG: 20 TABLET, FILM COATED ORAL at 08:32

## 2021-07-07 RX ADMIN — IPRATROPIUM BROMIDE 1 SPRAY: 42 SPRAY NASAL at 12:50

## 2021-07-07 RX ADMIN — METOPROLOL SUCCINATE 25 MG: 25 TABLET, EXTENDED RELEASE ORAL at 13:48

## 2021-07-07 RX ADMIN — PYRIDOXINE HCL TAB 50 MG 50 MG: 50 TAB at 17:21

## 2021-07-07 RX ADMIN — LORAZEPAM 2 MG: 2 INJECTION INTRAMUSCULAR; INTRAVENOUS at 17:27

## 2021-07-07 RX ADMIN — GUAIFENESIN 1200 MG: 600 TABLET, EXTENDED RELEASE ORAL at 17:21

## 2021-07-07 RX ADMIN — MORPHINE SULFATE 2 MG: 2 INJECTION, SOLUTION INTRAMUSCULAR; INTRAVENOUS at 16:51

## 2021-07-07 RX ADMIN — PHENOL 1 SPRAY: 1.5 LIQUID ORAL at 10:17

## 2021-07-07 RX ADMIN — LORAZEPAM 2 MG: 2 INJECTION INTRAMUSCULAR; INTRAVENOUS at 22:23

## 2021-07-07 RX ADMIN — ACETAMINOPHEN 1000 MG: 500 TABLET ORAL at 12:12

## 2021-07-07 RX ADMIN — PYRIDOXINE HCL TAB 50 MG 50 MG: 50 TAB at 08:31

## 2021-07-07 RX ADMIN — LISINOPRIL 20 MG: 20 TABLET ORAL at 16:25

## 2021-07-07 RX ADMIN — CEFAZOLIN SODIUM 2 G: 1 INJECTION, POWDER, FOR SOLUTION INTRAMUSCULAR; INTRAVENOUS at 01:00

## 2021-07-07 RX ADMIN — GABAPENTIN 800 MG: 100 CAPSULE ORAL at 18:33

## 2021-07-07 RX ADMIN — FERROUS SULFATE TAB 325 MG (65 MG ELEMENTAL FE) 325 MG: 325 (65 FE) TAB at 08:32

## 2021-07-07 RX ADMIN — ARFORMOTEROL TARTRATE: 15 SOLUTION RESPIRATORY (INHALATION) at 20:47

## 2021-07-07 RX ADMIN — GABAPENTIN 800 MG: 100 CAPSULE ORAL at 22:12

## 2021-07-07 RX ADMIN — DIAZEPAM 5 MG: 5 TABLET ORAL at 01:01

## 2021-07-07 RX ADMIN — OXYCODONE 10 MG: 5 TABLET ORAL at 15:36

## 2021-07-07 RX ADMIN — SODIUM CHLORIDE 125 ML/HR: 9 INJECTION, SOLUTION INTRAVENOUS at 04:10

## 2021-07-07 RX ADMIN — LEVETIRACETAM 1000 MG: 500 TABLET ORAL at 17:21

## 2021-07-07 RX ADMIN — LORAZEPAM 2 MG: 2 INJECTION INTRAMUSCULAR; INTRAVENOUS at 20:38

## 2021-07-07 RX ADMIN — LEVETIRACETAM 1000 MG: 500 TABLET ORAL at 08:32

## 2021-07-07 RX ADMIN — CYANOCOBALAMIN TAB 500 MCG 1000 MCG: 500 TAB at 08:32

## 2021-07-07 RX ADMIN — DIAZEPAM 5 MG: 5 TABLET ORAL at 10:17

## 2021-07-07 RX ADMIN — Medication 10 ML: at 22:13

## 2021-07-07 RX ADMIN — GUAIFENESIN 1200 MG: 600 TABLET, EXTENDED RELEASE ORAL at 08:32

## 2021-07-07 RX ADMIN — OXYCODONE 10 MG: 5 TABLET ORAL at 12:30

## 2021-07-07 RX ADMIN — LABETALOL HYDROCHLORIDE 10 MG: 5 INJECTION INTRAVENOUS at 16:42

## 2021-07-07 RX ADMIN — Medication 1 AMPULE: at 20:38

## 2021-07-07 RX ADMIN — Medication 1 AMPULE: at 09:00

## 2021-07-07 RX ADMIN — HYDROMORPHONE HYDROCHLORIDE 1 MG: 1 INJECTION, SOLUTION INTRAMUSCULAR; INTRAVENOUS; SUBCUTANEOUS at 06:33

## 2021-07-07 RX ADMIN — LORAZEPAM 2 MG: 2 INJECTION INTRAMUSCULAR; INTRAVENOUS at 18:42

## 2021-07-07 NOTE — PROGRESS NOTES
Problem: Self Care Deficits Care Plan (Adult)  Goal: *Acute Goals and Plan of Care (Insert Text)  Description: FUNCTIONAL STATUS PRIOR TO ADMISSION: on 3L NC with activity and not at rest, has a personal care aide to assist with IADLs, performed ADLS on his own    [facility]: The patient lived alone but has a paid personal care aide 6 days a week. Occupational Therapy Goals:  Initiated 7/7/2021  1. Patient will perform upper body dressing and lower body dressing with modified independence within 7 days. 2. Patient will perform toileting with modified independence within 7 days. 7/7/2021 1316 by Neri Chamorro OTR/L  Outcome: Not Met  OCCUPATIONAL THERAPY EVALUATION  Patient: Gume Hanley (89 y.o. male)  Date: 7/7/2021  Primary Diagnosis: Cervical spinal stenosis [M48.02]  Procedure(s) (LRB):  CERVICAL 3 TO CERVICAL 7 ANTERIOR CERVICAL DISCECTOMY AND FUSION (N/A) 1 Day Post-Op   Precautions:   Spinal (BLT, Aspen Collar)    ASSESSMENT  Based on the objective data described below, the patient presents with frustration over lack of ability to turn his head and look down due to aspen collar. At baseline pt is on 3L NC with activity. He reports having and shoulder strap based portable concentrator at home and he will be unable to carry this due to pain in his neck and precautions. He was able to manage rolling tank during session adhering to precautions without assist. Good understanding of precautions and pt reported recent LS surgery at the beginning of the year. He reported left hand weakness but this did not impede function today. He was able to perform crossed leg/tailor sitting for LB dressing attempt and has AE at home to assist if needed. Recommend return to home with initial home health at discharge. .    Current Level of Function Impacting Discharge (ADLs/self-care):    Sit to Stand: Modified independent  Stand to Sit: Modified independent  Bed to Chair: Modified independent (and managing rolling O2 tank)  Bathroom Mobility: Modified independent  Toilet Transfer : Modified independent    Feeding: Independent    Oral Facial Hygiene/Grooming: Modified Independent    Bathing: Stand-by assistance    Upper Body Dressing: Stand-by assistance    Lower Body Dressing: Minimum assistance    Toileting: Stand by assistance    Functional Outcome Measure: The patient scored 90/100 on the barthel outcome measure which is indicative of barthel. Other factors to consider for discharge: none     Patient will benefit from skilled therapy intervention to address the above noted impairments. PLAN :  Recommendations and Planned Interventions: self care training, functional mobility training, therapeutic exercise, balance training, therapeutic activities, patient education, home safety training and family training/education    Frequency/Duration: Patient will be followed by occupational therapy 4 times a week to address goals. Recommendation for discharge: (in order for the patient to meet his/her long term goals)  Occupational therapy at least 2 days/week in the home AND ensure assist and/or supervision for safety with bathing    This discharge recommendation:  Has been made in collaboration with the attending provider and/or case management    IF patient discharges home will need the following DME: none       SUBJECTIVE:   Patient stated This left hand is weak.     OBJECTIVE DATA SUMMARY:   HISTORY:   Past Medical History:   Diagnosis Date    Anxiety     Arthritis     Bullous emphysema (HCC)     Chronic obstructive pulmonary disease (HCC)     O2 2L sitting - 3L walking -- 24hr/day    Chronic pain     BACK AND LEGS    Elevated hemoglobin A1c 9/25/2019    GERD (gastroesophageal reflux disease)     H/O cardiovascular stress test 06/03/2019    6/3/19 \"Negative for ischemia. Preserved LV function. EF 65%. No wall motion abnormalities. \" per Dr Miller Friendly notes 6/3/19.    H/O ETOH abuse     Hypertension     MRSA carrier 09/26/2019    On home oxygen therapy     uses 3L with activity    Opioid dependence ([facility]     per PCP clearance 2020    Pneumothorax 2009 & 2010    right & left    Psychiatric disorder     DEPRESSION    Seizures ([facility] [facility] 2014    Sleep apnea 2020    not currently using CPAP     Stroke ([facility] 2015    \"mini-stroke'     Past Surgical History:   Procedure Laterality Date    HX HERNIA REPAIR Right 2015    HX HERNIA REPAIR Left 2015    HX OTHER SURGICAL  2009 & 2010    CHEST TUBE WITH COLLAPSED LUNGS    HX ROTATOR CUFF REPAIR Right 2016    HX ROTATOR CUFF REPAIR Left 2016       Expanded or extensive additional review of patient history:     Home Situation  Home Environment: Private residence  # Steps to Enter: 4  Rails to Enter: Yes  Hand Rails : Bilateral  One/Two Story Residence: One story  Living Alone: Yes  Support Systems: Friends \ neighbors, Home care staff  Patient Expects to be Discharged to[de-identified] House  Current DME Used/Available at Home: Cane, straight, Tub transfer bench (reacher, sock aid)  Tub or Shower Type: Tub/Shower combination    Hand dominance: Right    EXAMINATION OF PERFORMANCE DEFICITS:  Cognitive/Behavioral Status:  Neurologic State: Alert  Orientation Level: Oriented X4  Cognition: Appropriate decision making; Appropriate for age attention/concentration; Appropriate safety awareness  Perception: Appears intact  Perseveration: No perseveration noted  Safety/Judgement: Awareness of environment; Fall prevention;Home safety; Insight into deficits      Hearing:   Auditory  Auditory Impairment: None  Hearing Aids/Status: Does not own    Vision/Perceptual:                                Corrective Lenses: Glasses    Range of Motion:    AROM: Generally decreased, functional                         Strength:    Strength: Generally decreased, functional                Coordination:  Coordination: Within functional limits  Fine Motor Skills-Upper: Left Intact; Right Intact    Gross Motor Skills-Upper: Left Intact; Right Intact    Tone & Sensation:    Tone: Normal  Sensation: Intact                      Balance:  Sitting: Intact  Standing: Intact  Standing - Static: Good  Standing - Dynamic : Good    Functional Mobility and Transfers for ADLs:    Transfers:  Sit to Stand: Modified independent  Stand to Sit: Modified independent  Bed to Chair: Modified independent (and managing rolling O2 tank)  Bathroom Mobility: Modified independent  Toilet Transfer : Modified independent    ADL Assessment:  Feeding: Independent    Oral Facial Hygiene/Grooming: Modified Independent    Bathing: Stand-by assistance    Upper Body Dressing: Stand-by assistance    Lower Body Dressing: Minimum assistance    Toileting: Stand by assistance                ADL Intervention and task modifications:  Patient instructed and demonstrated 3/3 cervical spine precautions during ADLs and functional mobility with verbal cues. Patient instructed and indicated understanding the benefits of maintaining activity tolerance, functional mobility, and independence with self care tasks during acute stay  to ensure safe return home and to baseline. Encouraged patient to increase frequency and duration OOB, not sitting longer than 30 mins without marching/walking with staff, be out of bed for all meals, perform daily ADLs (as approved by RN/MD regarding bathing etc), and performing functional mobility to/from bathroom. Patient instruction and indicated understanding on body mechanics, ergonomics and gravitational force on the spine during different body positions to plan activities in prep for return home to complete basic ADLs, instrumental ADLs and back to work safely. Bathing: Patient instructed and indicated understanding when bathing to not submerge wound in water and to follow doctors instruction on when to start bathing and on wound care.   Informed pt to follow doctors instructions on if he is allow to have collar off to shower. Dressing brace: Patient instructed and understands how to doff and don collar and has performed prior to session per his report. Dressing lower body: Patient instructed benefits to remain seated to don all clothing to increase independence with precautions and pain management. Patient instructed and demonstrated tailor sitting for lower body dressing. He has a hip kit and is familiar with using these devices if needed for LB dressing   Home safety: Patient instructed and indicated understanding on home modifications and safety [raise height of ADL objects (i.e. clothing, sink items, fridge items, items to mouth when grooming), change of floor surfaces, clear pathways] to increase independence and fall prevention. Standing: Patient instructed and indicated understanding to walk up to sink/counter top/surfaces, step into walker, square off while using objects, slide objects along surfaces, to increase adherence to back precautions and fall prevention. Functional Measure:  Barthel Index:    Bathin  Bladder: 10  Bowels: 10  Groomin  Dressin  Feeding: 10  Mobility: 15  Stairs: 10  Toilet Use: 10  Transfer (Bed to Chair and Back): 15  Total: 90/100        The Barthel ADL Index: Guidelines  1. The index should be used as a record of what a patient does, not as a record of what a patient could do. 2. The main aim is to establish degree of independence from any help, physical or verbal, however minor and for whatever reason. 3. The need for supervision renders the patient not independent. 4. A patient's performance should be established using the best available evidence. Asking the patient, friends/relatives and nurses are the usual sources, but direct observation and common sense are also important. However direct testing is not needed.   5. Usually the patient's performance over the preceding 24-48 hours is important, but occasionally longer periods will be relevant. 6. Middle categories imply that the patient supplies over 50 per cent of the effort. 7. Use of aids to be independent is allowed. Yuliana Salcido., Barthel, DMarieW. ([id number]). Functional evaluation: the Barthel Index. [address] (14)2. Ar Right daniel [place], J.J.M.F, Sarita Irizarry., Sohamcaprice Neff., [place], [address] [facility] (). Measuring the change indisability after inpatient rehabilitation; comparison of the responsiveness of the Barthel Index and Functional [place] Measure. Journal of Neurology, Neurosurgery, and Psychiatry, 66(4), 168-242. ELISSA Lewis.JERSON, ANDRY Duke, & Manish Avila M.A. (2004.) Assessment of post-stroke quality of life in cost-effectiveness studies: The usefulness of the Barthel Index and the EuroQoL-5D. Quality of Life Research, 15, 118-28       Occupational Therapy Evaluation Charge Determination   History Examination Decision-Making   LOW Complexity : Brief history review  LOW Complexity : 1-3 performance deficits relating to physical, cognitive , or psychosocial skils that result in activity limitations and / or participation restrictions  LOW Complexity : No comorbidities that affect functional and no verbal or physical assistance needed to complete eval tasks       Based on the above components, the patient evaluation is determined to be of the following complexity level: LOW   Pain Ratin/10 neck pain (nurse aware)    Activity Tolerance:   Good    After treatment patient left in no apparent distress:    Sitting in chair and Call bell within reach    COMMUNICATION/EDUCATION:   The patients plan of care was discussed with: Physical therapist, Registered nurse and patient. Home safety education was provided and the patient/caregiver indicated understanding., Patient/family have participated as able in goal setting and plan of care. and Patient/family agree to work toward stated goals and plan of care.     This patients plan of care is appropriate for delegation to FITO.    Thank you for this referral.  Arpan Flores, OTR/L  Time Calculation: 24 mins

## 2021-07-07 NOTE — PROGRESS NOTES
PHYSICAL THERAPY EVALUATION/DISCHARGE  Patient: Dillon Hernandez (25 y.o. male)  Date: 7/7/2021  Primary Diagnosis: Cervical spinal stenosis [M48.02]  Procedure(s) (LRB):  CERVICAL 3 TO CERVICAL 7 ANTERIOR CERVICAL DISCECTOMY AND FUSION (N/A) 1 Day Post-Op   Precautions:   Spinal (BLT, Aspen Collar)      ASSESSMENT  Based on the objective data described below, the patient presents with expected post op cervical pain, O2 dependence with mobility(baseline), and decreased activity tolerance. Pt received sitting eob on arrival with O2 n/c out of his nose. He reports he only uses O2 with mobility at home. He reports living alone with a cg that comes 6 days a week from 9 to 5. He reports independence at baseline and living alone. Pt demonstrates all mobility at mod I, with intact balance, and use of HHA to simulate a cane with ambulation. Stair navigation complete and pt mod I with rail, using reciprocal pattern. No other deficits noted that warrant further PT intervention at this time. Pt cleared to discharge home with the assistance of his cg. Functional Outcome Measure: The patient scored 90/100 on the Barthel Index outcome measure which is indicative of 10% impaired function/adls      Other factors to consider for discharge: lives alone     Further skilled acute physical therapy is not indicated at this time. PLAN :  Recommendation for discharge: (in order for the patient to meet his/her long term goals)  No skilled physical therapy/ follow up rehabilitation needs identified at this time. This discharge recommendation:  A follow-up discussion with the attending provider and/or case management is planned    IF patient discharges home will need the following DME: patient owns DME required for discharge       SUBJECTIVE:   Patient stated I use oxygen when I'm moving around.     OBJECTIVE DATA SUMMARY:   HISTORY:    Past Medical History:   Diagnosis Date    Anxiety     Arthritis     [facility] [address]     Chronic obstructive pulmonary disease (HCC)     O2 2L sitting - 3L walking -- 24hr/day    Chronic pain     BACK AND LEGS    Elevated hemoglobin A1c 9/25/2019    GERD (gastroesophageal reflux disease)     H/O cardiovascular stress test 06/03/2019    6/3/19 \"Negative for ischemia. Preserved LV function. EF 65%. No wall motion abnormalities. \" per Dr Tom Schwartz notes 6/3/19.    H/O ETOH abuse     Hypertension     MRSA carrier 09/26/2019    On home oxygen therapy     uses 3L with activity    Opioid dependence ([facility] [address]     per PCP clearance 2020    Pneumothorax 2009 & 2010    right & left    Psychiatric disorder     DEPRESSION    Seizures ([facility] [address] 2014    Sleep apnea 2020    not currently using CPAP     Stroke [facility]) 2015    \"mini-stroke'     Past Surgical History:   Procedure Laterality Date    HX HERNIA REPAIR Right 2015    HX HERNIA REPAIR Left 2015    HX OTHER SURGICAL  2009 & 2010    CHEST TUBE WITH COLLAPSED LUNGS    HX ROTATOR CUFF REPAIR Right 2016    HX ROTATOR CUFF REPAIR Left 2016       Prior level of function: I, R neck pain, RUE pain, impaired mobility/balance, cane with ambulation outside of the home  Personal factors and/or comorbidities impacting plan of care: none    Home Situation  Home Environment: Private residence  # Steps to Enter: 4  Rails to Enter: Yes  Hand Rails : Bilateral  One/Two Story Residence: One story  Living Alone: Yes  Support Systems: Friends \ neighbors, Home care staff  Patient Expects to be Discharged to[de-identified] House  Current DME Used/Available at Home: Cane, straight, Tub transfer bench (reacher, sock aid)  Tub or Shower Type: Tub/Shower combination    EXAMINATION/PRESENTATION/DECISION MAKING:   Critical Behavior:  Neurologic State: Alert  Orientation Level: Oriented X4  Cognition: Appropriate decision making, Appropriate for age attention/concentration, Appropriate safety awareness  Safety/Judgement: Awareness of environment, Fall prevention, Home safety, Insight into deficits  Hearing: Auditory  Auditory Impairment: None  Hearing Aids/Status: Does not own  Range Of Motion:  AROM: Generally decreased, functional      Strength:    Strength: Generally decreased, functional            Tone & Sensation:   Tone: Normal              Sensation: Intact               Coordination:  Coordination: Within functional limits  Vision:   Corrective Lenses: Glasses  Functional Mobility:  Bed Mobility:              Deferred, pt seated eob on arrival           Transfers:  Sit to Stand: Modified independent  Stand to Sit: Modified independent        Bed to Chair: Modified independent (and managing rolling O2 tank)              Balance:   Sitting: Intact  Standing: Intact  Standing - Static: Good  Standing - Dynamic : Good  Ambulation/Gait Training:  Distance (ft): 150 Feet (ft)  Assistive Device: Gait belt  Ambulation - Level of Assistance: Independent        Gait Abnormalities: Decreased step clearance        Base of Support: Widened     Speed/Dee: Pace decreased (<100 feet/min)  Step Length: Left shortened;Right shortened                 Stairs:  Number of Stairs Trained: 4  Stairs - Level of Assistance: Modified independent   Rail Use: Right       Functional Measure:  Barthel Index:    Bathin  Bladder: 10  Bowels: 10  Groomin  Dressin  Feeding: 10  Mobility: 15  Stairs: 10  Toilet Use: 10  Transfer (Bed to Chair and Back): 15  Total: 90/100       The Barthel ADL Index: Guidelines  1. The index should be used as a record of what a patient does, not as a record of what a patient could do. 2. The main aim is to establish degree of independence from any help, physical or verbal, however minor and for whatever reason. 3. The need for supervision renders the patient not independent. 4. A patient's performance should be established using the best available evidence.  Asking the patient, friends/relatives and nurses are the usual sources, but direct observation and common sense are also important. However direct testing is not needed. 5. Usually the patient's performance over the preceding 24-48 hours is important, but occasionally longer periods will be relevant. 6. Middle categories imply that the patient supplies over 50 per cent of the effort. 7. Use of aids to be independent is allowed. Bello Myles., Barthel, D.W. ([id number]). Functional evaluation: the Barthel Index. [address] (14)2. Ervin Crespo daniel ALEXANDRA Ovalle, Fabian Bruno., Sharon Sarmiento., [place], [address] (1999). Measuring the change indisability after inpatient rehabilitation; comparison of the responsiveness of the Barthel Index and Functional [place] Measure. Journal of Neurology, Neurosurgery, and Psychiatry, 66(4), 622-929. DEVON Morrow, ANDRY Duke, & Kesha Wolf MAME. (2004.) Assessment of post-stroke quality of life in cost-effectiveness studies: The usefulness of the Barthel Index and the EuroQoL-5D. Quality of Life Research, 15, 392-80          Physical Therapy Evaluation Charge Determination   History Examination Presentation Decision-Making   LOW Complexity : Zero comorbidities / personal factors that will impact the outcome / POC LOW Complexity : 1-2 Standardized tests and measures addressing body structure, function, activity limitation and / or participation in recreation  LOW Complexity : Stable, uncomplicated  LOW Complexity : FOTO score of       Based on the above components, the patient evaluation is determined to be of the following complexity level: LOW     Pain Rating:  Didn't rate the pain    Activity Tolerance:   Good      After treatment patient left in no apparent distress:   Sitting in chair and Call bell within reach    COMMUNICATION/EDUCATION:   The patients plan of care was discussed with: Occupational therapist and Registered nurse. Fall prevention education was provided and the patient/caregiver indicated understanding.     Thank you for this referral.  Antolin Ravi Kirk, PT   Time Calculation: 22 mins

## 2021-07-07 NOTE — PROGRESS NOTES
TRANSFER - IN REPORT:    Verbal report received from ROSLYN Cope(name) on Timothy Brownlee  being received from [employer] for routine post - op      Report consisted of patients Situation, Background, Assessment and   Recommendations(SBAR). Information from the following report(s) SBAR, Kardex, OR Summary, Intake/Output and MAR was reviewed with the receiving nurse. Opportunity for questions and clarification was provided. Assessment completed upon patients arrival to unit and care assumed.

## 2021-07-07 NOTE — PROGRESS NOTES
Orders received, chart reviewed and patient evaluated by physical therapy. Pending progression with skilled acute physical therapy, recommend:  No skilled physical therapy/ follow up rehabilitation needs identified at this time. Recommend with nursing OOB to chair 3x/day and walking daily with nursing assist and gait belt and aspen collar. Thank you for completing as able in order to maintain patient strength, endurance and independence. Full evaluation to follow.

## 2021-07-07 NOTE — PROGRESS NOTES
Ortho / Neurosurgery NP Note    POD# 1  s/p CERVICAL 3 TO CERVICAL 7 ANTERIOR CERVICAL DISCECTOMY AND FUSION   Pt seen with no visitor present. Pt seen sitting on EOB, eating breakfast.   C/o incisional neck pain and sore throat - relieved by oxycodone (given at 22:00 and 4:00)  Discussed staying ahead of pain/more regular use of pain medicine if needed  Sore throat relieved by cepacols, would like to try chloraseptic spray. Also reports pain across upper back/shoulders, likely 2/2 positioning - reports relief from valium   Declined trying ice packs, states ice exacerbates his shoulder arthritis   Tolerating regular diet. Denies nausea or issues swallowing. Clear/soft voice, similar to pre-op      VSS Afebrile. Room air. Wears 3L O2 with exertion at baseline   Mild Tachycardia - -115  Denies CP, palpitations, SOB at baseline     Visit Vitals  BP (!) 150/95   Pulse (!) 111   Temp 98.3 °F (36.8 °C)   Resp 18   Ht 5' 11\" (1.803 m)   Wt 97.5 kg (215 lb)   SpO2 96%   BMI 29.99 kg/m²       Voiding status: voiding   Output (mL)  Urine Voided: 350 ml (07/07/21 0842)      Labs    Lab Results   Component Value Date/Time    HGB 12.1 07/05/2021 04:51 PM      Lab Results   Component Value Date/Time    INR 1.0 07/06/2021 04:04 AM      Lab Results   Component Value Date/Time    Sodium 140 07/06/2021 04:04 AM    Potassium 4.1 07/06/2021 04:04 AM    Chloride 108 07/06/2021 04:04 AM    CO2 29 07/06/2021 04:04 AM    Glucose 76 07/06/2021 04:04 AM    BUN 18 07/06/2021 04:04 AM    Creatinine 1.00 07/06/2021 04:04 AM    Calcium 8.9 07/06/2021 04:04 AM     Recent Glucose Results: No results found for: GLU, GLUPOC, GLUCPOC        Body mass index is 29.99 kg/m². : A BMI > 30 is classified as obesity and > 40 is classified as morbid obesity. Prineo dressing c.d.i  MADI drain removed today   Calves soft and supple;  No pain with passive stretch  Sensation and motor intact   SCDs for mechanical DVT proph while in bed PLAN:  1) Neurovascular assessment q4 hours   2) PT/OT - Hard cervical collar. 3) Pain control - scheduled tylenol, prn oxycodone. IV dilaudid discontinued. 4) HTN with tachycardia - PTA med list not updated, reached out to pharmacy to complete med rec of current medications. Notified IM as well, IM following for medical management, appreciate assistance. 5) Readniess for discharge:     [x] Vital Signs stable    [x] + Voiding    [x] Wound intact, drainage minimal    [x] Tolerating PO intake     [] Cleared by PT (OT if applicable)     [] Stair training completed (if applicable)    [] Independent / Contact Guard Assist (household distance)     [] Bed mobility     [] Car transfers     [] ADLs    [] Adequate pain control on oral medication alone     Discharge pending pain control on oral regimen, medical and therapy clearance. Lives alone, has CNA who helps with IADLs and local [employer] friends who can provide some support.       Matthew Stokes NP

## 2021-07-07 NOTE — PROGRESS NOTES
1530-Report taken from ROSLYN Trujillo. Patient c/o severe pain. PRN oxycodone given per order. 1615-Patient rang call ball and stated is having some tightness in the center of chest, no radiation, no difficulty breathing. 1730-EKG completed, troponin sent, morphine and labetalol given per order. CIWA score is 11, ativan given per protocol. Patient admits to [facility] beer\" daily but also admits to a half a pint \"over the weekend\" for the 4th of July. Last ETOH intake was 12 pm on 7/5 per patient. Dr. Beto York aware, treat per CIWA protocol. 1830-Blood pressures improved with medication. Patient NSR/sinus tach on monitor. He is still anxious, tremors noted in bilateral arms. CIWA score 11, given ativan per protocol. 1900-  End of Shift Note    Bedside shift change report given to Roger Langston (oncoming nurse) by Pascale Mir (offgoing nurse). Report included the following information SBAR, Kardex, Procedure Summary, Intake/Output, MAR and Recent Results    Shift worked:  3p-7p     Shift summary and any significant changes:     CIWA score elevated, see progress notes above. Dr. Beto York aware. BP better. Placed on tele per order. Concerns for physician to address:  CIWA     Zone phone for oncoming shift:          Activity:  Activity Level:  Up with Assistance  Number times ambulated in hallways past shift: 1  Number of times OOB to chair past shift: 1    Cardiac:   Cardiac Monitoring: Yes      Cardiac Rhythm: Sinus Tach    Access:   Current line(s): PIV     Genitourinary:   Urinary status: voiding    Respiratory:   O2 Device: None (Room air)  Chronic home O2 use?: YES  Incentive spirometer at bedside: YES     GI:  Last Bowel Movement Date: 07/07/21 (reports it, not seen)  Current diet:  ADULT DIET Regular  Passing flatus: YES  Tolerating current diet: YES       Pain Management:   Patient states pain is manageable on current regimen: YES    Skin:  Bryon Score: 20  Interventions: turn team    Patient Safety:  Fall Score:  Total Score: 2  Interventions: assistive device (walker, cane, etc)  High Fall Risk: Yes    Length of Stay:  Expected LOS: - - -  Actual LOS: [address]

## 2021-07-07 NOTE — PROGRESS NOTES
End of Shift Note    Bedside shift change report given to Sherin Weller RN (oncoming nurse) by Drenda Cranker, RN (offgoing nurse). Report included the following information SBAR, Kardex, MAR and Recent Results    Shift worked:  7-3p     Shift summary and any significant changes:     Pain control. Cleared PT - up independently. Reports stools - not visualized. Concerns for physician to address:       Zone phone for oncoming shift:          Activity:  Activity Level: Up with Assistance  Number times ambulated in hallways past shift: 1  Number of times OOB to chair past shift: 2    Cardiac:   Cardiac Monitoring: No      Cardiac Rhythm: Sinus Tach    Access:   Current line(s): PIV     Genitourinary:   Urinary status: voiding    Respiratory:   O2 Device: None (Room air)  Chronic home O2 use?: YES - 3LNC on exertion  Incentive spirometer at bedside: YES     GI:     Current diet:  ADULT DIET Regular  Passing flatus: YES  Tolerating current diet: YES       Pain Management:   Patient states pain is manageable on current regimen: NO    Skin:  Bryon Score: 20  Interventions: increase time out of bed    Patient Safety:  Fall Score:  Total Score: 2  Interventions: assistive device (walker, cane, etc) and gripper socks       Length of Stay:  Expected LOS: - - -  Actual LOS: 2      Drenda Cranker, RN

## 2021-07-07 NOTE — PROGRESS NOTES
Hospitalist Progress Note    NAME: Santosh Brooks   :  1960   MRN:  [id number]       Assessment / Plan:  Cervical stenosis s/p anterior cervical disc fusion  -Postoperative care per neurosurgery. Cervical collar in place.  -Pain control being managed by orthopedics    Hypertension uncontrolled  -Resume his home metoprolol. Stop IV fluids. Pain control should help with his blood pressure. Chronic alcoholism with concern for alcohol withdrawal  -Not started on alcohol withdrawal protocol. Start alcohol withdrawal protocol with Ativan     Chronic respiratory failure with hypoxia on home oxygen  Emphysema  History of tobacco abuse  abNormal CT of the chest  TANIA not on CPAP  CT of the chest showed   1. Bilateral parenchymal scarring and traction bronchiectasis without evidence  of honeycombing. Given the upper lobe predominance of findings and multiple  calcified lymph nodes, this may be due to sarcoidosis. 2. 9.3 mm left upper lobe pulmonary nodule with internal central calcification,  likely a granuloma in this patient with multiple calcified mediastinal lymph  nodes. This could be followed up with CT scan. 3. Emphysema. Continue with home inhalers with pharmacy substitution, continue with nebs  Continue with montelukast      History of seizure disorder  gerd  Patient reported that he had seizures as a side effect of Chantix  He has been on Keppra for the last 7 years, he supposed to take Keppra 1000 twice daily but he only takes it daily  Cont keppra  Continue Protonix    Underlying psych disorder  Continue with risperidone, trazodone and Vistaril        Code Status: Full  DVT Prophylaxis: Per surgery         Body mass index is 29.99 kg/m².     Code status: Full  Prophylaxis: Per primary   Recommended Disposition: Home w/Family     Subjective:     Chief Complaint / Reason for Physician Visit  Reports that neck pain is poorly controlled  Blood pressure is still high      Review of Systems:  Symptom Y/N Comments  Symptom Y/N Comments   Fever/Chills    Chest Pain     Poor Appetite    Edema     Cough    Abdominal Pain     Sputum    Joint Pain     SOB/YOUNG    Pruritis/Rash     Nausea/vomit    Tolerating PT/OT     Diarrhea    Tolerating Diet     Constipation    Other       Could NOT obtain due to:      Objective:     VITALS:   Last 24hrs VS reviewed since prior progress note.  Most recent are:  Patient Vitals for the past 24 hrs:   Temp Pulse Resp BP SpO2   07/07/21 1117 97.4 °F (36.3 °C) (!) 123 18 (!) 153/95 98 %   07/07/21 0709 98.3 °F (36.8 °C) (!) 111 18 (!) 150/95 96 %   07/07/21 0337 98.6 °F (37 °C) (!) 112 16 (!) 151/67 100 %   07/07/21 0225    (!) 151/90    07/06/21 2352 98.7 °F (37.1 °C) (!) 116 18 (!) 156/95 100 %   07/06/21 2255 98.2 °F (36.8 °C) (!) 115 17 (!) 157/98 100 %   07/06/21 2152 97.5 °F (36.4 °C) (!) 109 16 (!) 155/93 100 %   07/06/21 2051 98.2 °F (36.8 °C) (!) 105 16 (!) 152/98 98 %   07/06/21 2030 98.3 °F (36.8 °C) (!) 105 17 (!) 149/89 100 %   07/06/21 2015  (!) 108 21 (!) 160/83 100 %   07/06/21 2000  (!) 106 15 (!) 154/87 98 %   07/06/21 1947  (!) 108 18  100 %   07/06/21 1945  (!) 105 18 139/79 100 %   07/06/21 1930  (!) 105 20 (!) 151/89 100 %   07/06/21 1927  (!) 108 16  100 %   07/06/21 1915  (!) 107 24 138/83 99 %   07/06/21 1913  (!) 107 20  100 %   07/06/21 1900  (!) 105 22 (!) 147/90 100 %   07/06/21 1858  (!) 106 20  100 %   07/06/21 1853  100 18  100 %   07/06/21 1850  (!) 104 15  100 %   07/06/21 1846  (!) 103 15  99 %   07/06/21 1845  (!) 106 20 136/83 99 %   07/06/21 1843  (!) 105 18  99 %   07/06/21 1839  (!) 106 13  100 %   07/06/21 1835  (!) 105 18  100 %   07/06/21 1832  (!) 105 22  100 %   07/06/21 1830  (!) 105 18 136/81 99 %   07/06/21 1829  (!) 104 18  100 %   07/06/21 1825  (!) 106 16 130/81 98 %   07/06/21 1820  (!) 105 20 132/69 98 %   07/06/21 1815  96 20 130/79 100 %   07/06/21 1812 98.6 °F (37 °C) (!) 101 20 136/83 99 % 07/06/21 1810  (!) 110 20 136/83 99 %       Intake/Output Summary (Last 24 hours) at 7/7/2021 1501  Last data filed at 7/7/2021 0842  Gross per 24 hour   Intake 20 ml   Output 1845 ml   Net -1825 ml        PHYSICAL EXAM:  General: Alert, cooperative, no acute distress    EENT:  EOMI. Anicteric sclerae. MMM, cervical collar present  Resp:  CTA bilaterally, no wheezing or rales. No accessory muscle use  CV:  Regular  rhythm,  No edema  GI:  Soft, Non distended, Non tender.  +Bowel sounds  Neurologic:  Alert and oriented X 3, normal speech,   Psych:   Not anxious nor agitated  Skin:  No rashes.   No jaundice    Reviewed most current lab test results and cultures  YES  Reviewed most current radiology test results   YES  Review and summation of old records today    NO  Reviewed patient's current orders and MAR    YES  PMH/SH reviewed - no change compared to H&P          Current Facility-Administered Medications:     phenol throat spray (CHLORASEPTIC) 1 Spray, 1 Spray, Oral, PRN, David Martinez, NP, 1 Spray at 07/07/21 1017    [START ON 7/8/2021] fluticasone propionate (FLONASE) 50 mcg/actuation nasal spray 2 Spray, 2 Spray, Both Nostrils, DAILY, Collette Contreras, NP, 2 Spray at 07/07/21 1251    ipratropium (ATROVENT) 42 mcg (0.06 %) nasal spray 1 Spray, 1 Spray, Both Nostrils, TID, David Martinez NP, 1 Spray at 07/07/21 1250    metoprolol succinate (TOPROL-XL) XL tablet 25 mg, 25 mg, Oral, DAILY, Srinivas Dee MD, 25 mg at 07/07/21 1348    cyanocobalamin (VITAMIN B12) tablet 1,000 mcg, 1,000 mcg, Oral, DAILY, Herman ARTHUR MD, 1,000 mcg at 07/07/21 0832    carbamide peroxide (DEBROX) 6.5 % otic solution 5 Drop, 5 Drop, Both Ears, BID, Jareth Schmidt MD    [START ON 7/12/2021] ergocalciferol capsule 50,000 Units, 50,000 Units, Oral, Q7D, Jareth Schmidt MD    ferrous sulfate tablet 325 mg, 1 Tablet, Oral, ACB, Herman ARTHUR MD, 325 mg at 07/07/21 0832    guaiFENesin ER (MUCINEX) tablet 1,200 mg, 1,200 mg, Oral, BID, Monse ARTHUR MD, 1,200 mg at 07/07/21 2819    pantoprazole (PROTONIX) tablet 40 mg, 40 mg, Oral, ACB&D, Falguni Fry MD    pyridoxine (vitamin B6) (VITAMIN B-6) tablet 50 mg, 50 mg, Oral, BID, Monse ARTHUR MD, 50 mg at 07/07/21 0831    ipratropium (ATROVENT) 0.02 % nebulizer solution 0.5 mg, 0.5 mg, Nebulization, Q6H PRN, Falguni Fry MD    sodium chloride (NS) flush 5-40 mL, 5-40 mL, IntraVENous, Q8H, Ken Olmstead MD, 10 mL at 07/07/21 5680    sodium chloride (NS) flush 5-40 mL, 5-40 mL, IntraVENous, PRN, Falguni Fry MD    acetaminophen (TYLENOL) tablet 1,000 mg, 1,000 mg, Oral, Q6H, Ken Olmstead MD, 1,000 mg at 07/07/21 1212    oxyCODONE IR (ROXICODONE) tablet 5 mg, 5 mg, Oral, Q3H PRN, Falguni Fry MD    oxyCODONE IR (ROXICODONE) tablet 10 mg, 10 mg, Oral, Q3H PRN, Monse ARTHUR MD, 10 mg at 07/07/21 1230    ondansetron (ZOFRAN) injection 4 mg, 4 mg, IntraVENous, Q4H PRN, Falguni Fry MD    prochlorperazine (COMPAZINE) with saline injection 5 mg, 5 mg, IntraVENous, Q6H PRN, Falguni Fry MD    famotidine (PEPCID) tablet 20 mg, 20 mg, Oral, BID, Monse ARTHUR MD, 20 mg at 07/07/21 1331    hydrOXYzine HCL (ATARAX) tablet 10 mg, 10 mg, Oral, Q8H PRN, Falguni Fry MD    senna-docusate (PERICOLACE) 8.6-50 mg per tablet 1 Tablet, 1 Tablet, Oral, BID, Falguni Fry MD    polyethylene glycol (MIRALAX) packet 17 g, 17 g, Oral, DAILY, Alex Olmstead MD    [START ON 7/8/2021] bisacodyL (DULCOLAX) suppository 10 mg, 10 mg, Rectal, DAILY PRN, Falguni Fry MD    benzocaine-menthoL (CHLORASEPTIC MAX) lozenge 1 Lozenge, 1 Lozenge, Oral, PRN, Falguni Fry MD, 1 Lozenge at 07/06/21 2203    diazePAM (VALIUM) tablet 5 mg, 5 mg, Oral, Q6H PRN, Monse ARTHUR MD, 5 mg at 07/07/21 1017    cyclobenzaprine (FLEXERIL) tablet 10 mg, 10 mg, Oral, BID PRN, Falguni Fry MD    albuterol-ipratropium (DUO-NEB) 2.5 MG-0.5 MG/3 ML, 3 mL, Nebulization, Q4H PRN, Jessica Thapa MD    alcohol 62% (NOZIN) nasal  1 Ampule, 1 Ampule, Topical, Q12H, Xochitl ARTHUR MD, 1 Ampule at 07/07/21 0900    levETIRAcetam (KEPPRA) tablet 1,000 mg, 1,000 mg, Oral, BID, Salabao, Karen, NP, 1,000 mg at 07/07/21 7803    sodium chloride (NS) flush 5-40 mL, 5-40 mL, IntraVENous, Q8H, Cesar Koroma PA-C, 10 mL at 07/06/21 2136    sodium chloride (NS) flush 5-40 mL, 5-40 mL, IntraVENous, PRN, Cesar Koroma PA-C    naloxone [facility]) injection 0.4 mg, 0.4 mg, IntraVENous, PRN, Arden Machado PA-C  ________________________________________________________________________  Care Plan discussed with:    Comments   Patient y    Family      RN y    Care Manager     Consultant                        Multidiciplinary team rounds were held today with , nursing, pharmacist and clinical coordinator. Patient's plan of care was discussed; medications were reviewed and discharge planning was addressed. ________________________________________________________________________  Total NON critical care TIME:  35   Minutes    Total CRITICAL CARE TIME Spent:   Minutes non procedure based      Comments   >50% of visit spent in counseling and coordination of care     ________________________________________________________________________  Jae Agarwal MD     Procedures: see electronic medical records for all procedures/Xrays and details which were not copied into this note but were reviewed prior to creation of Plan. LABS:  I reviewed today's most current labs and imaging studies.   Pertinent labs include:  Recent Labs     07/05/21  1651   WBC 5.1   HGB 12.1   HCT 38.2        Recent Labs     07/06/21  0404 07/05/21  1651    137   K 4.1 4.2    102   CO2 29 31   GLU 76 90   BUN 18 20   CREA 1.00 1.19   CA 8.9 9.3   ALB 3.0* 3.6   TBILI 0.4 0.4   ALT 20 23   INR 1.0  --        Signed: Jae Agarwal MD

## 2021-07-07 NOTE — PROGRESS NOTES
Transition of Care Plan:     RUR: 13% low  Disposition: Home with follow up appointments (no therapy needs identified)  Follow up appointments: PCP and Ortho  DME needed: Pt has a cane. Transportation at Discharge: Pt uses Medicaid transport. Keys or means to access home:   yes  IM Medicare letter: n/a  Caregiver Contact: Missy Silva [phone]  Discharge Caregiver contacted prior to discharge? CM will contact at d/c if pt desires. Chart reviewed, patient with no therapy needs. Will need follow up appointments and transportation arranged at discharge. Care Management Interventions  PCP Verified by CM: Yes (Dr. Shirin Martinez)  Palliative Care Criteria Met (RRAT>21 & CHF Dx)?: No  Mode of Transport at Discharge: Other (see comment) (Medicaid transport)  Transition of Care Consult (CM Consult): Discharge Planning  Discharge Durable Medical Equipment: No (Pt has a cane)  Physical Therapy Consult: No  Occupational Therapy Consult: No  Speech Therapy Consult: No  Current Support Network: Own Home (Pt lives in a mobile home with 5 CLARA.)  Confirm Follow Up Transport: Other (see comment) (Medicaid transportation)  The Patient and/or Patient Representative was Provided with a Choice of Provider and Agrees with the Discharge Plan?: Yes  Name of the Patient Representative Who was Provided with a Choice of Provider and Agrees with the Discharge Plan: Sherri Powers  Discharge Location  [facility], [address] [place] [phone] [place]  [employer] ACP Facilitator  Zone Phone: [phone]

## 2021-07-07 NOTE — OP NOTES
[address]  OPERATIVE REPORT    Name:  Olivia Dunbar  MR#:  [id number]  :  1960  ACCOUNT #:  [de-identified]  DATE OF SERVICE:  2021    PREOPERATIVE DIAGNOSES:  1. Cervical spinal stenosis. 2.  Cervicalgia. 3.  Cervical radiculopathy. 4.  Cervical spondylosis with myelopathy. POSTOPERATIVE DIAGNOSES:  1. Cervical spinal stenosis. 2.  Cervicalgia. 3.  Cervical radiculopathy. 4.  Cervical spondylosis with myelopathy. PROCEDURE PERFORMED:  1. C3 through C7 anterior cervical discectomy and fusion x4.  2.  C3 through C7 application of interbody biomechanical device x4.  3.  C3 through C7 anterior instrumentation. 4.  Use of allograft bone for spine fusion. 5.  Use of operative microscope. SURGEON:  Jennifer Camilo MD    ASSISTANT:  DAPHNE Keith  There was the need for a surgical assistant on this case to assist with:  1) Safe and proper retraction. 2) Maintenance of visualization during surgery by helping with suctioning. 3) Multiple tasks throughout the decompression and instrumentation to allow for timely and safe completion of the procedure. 4) Overall assistance helped decrease the risk of complications and infection. ANESTHESIA:  General.    COMPLICATIONS:  None. SPECIMENS REMOVED:  None. IMPLANTS:  The [employer] FortiCore interbody biomechanical device and the [employer] FortiBridge anterior cervical plate. Please note that the interbody biomechanical device and the anterior instrumentation are both completely separate and independent devices that function autonomously from each other. ESTIMATED BLOOD LOSS:  25 mL. DRAINS:  X1.    INDICATION FOR PROCEDURE:  The patient is a pleasant 52-year-old gentleman with cervical spinal stenosis resulting in cervicalgia and cervical radiculopathy as well as a myelopathy with cord compression. At this point, we have decided to proceed with surgical intervention.   We had a long conversation discussing the pros and cons of surgery including, but not limited to, pain, scar, bleeding, infection, nonunion, damage to surrounding structures, death, paralysis, blindness, and stroke. We paid special attention to discussing the fact that he will likely have some dysphagia in addition to having some posterior neck pain as well as between the shoulder blades and the possibility of a C5 palsy. He understands those risks and would like to proceed. PROCEDURE:  After informed consent was obtained and the operative site was properly marked, the patient was moved back to operating room and underwent general endotracheal anesthesia. He was positioned supine on the operating table using the [facility] table flat top. His arms were well-padded and tucked at the side and knees were gently bent with pillows. Fluoroscopy was used to heena the level of the incision. We then proceeded to prep and drape in the usual manner. A time-out was obtained verifying that this was the correct patient, the correct surgery, the correct site as well as that he had received IV antibiotics within 30 minutes of the incision. In this case, he received IV Ancef. I then proceeded to perform my standard anterior approach to the cervical spine exposing the plane between the trachea and esophagus medially, the carotid sheath laterally and exposing the spine. I was able to expose from C3 through C7 and used fluoroscopy to verify they were in the correct levels. Electrocautery was used to elevate the longus colli muscle on both right and left sides protecting the sympathetic chain and exposing the uncinate processes bilaterally. I was able to expose that from C3 through C7. Once that area was fully exposed, I was able then to place my self-retaining retractor starting at C3-C4 and [place] pins at C3 and C4.   Gentle axial distraction was applied and the operative microscope was brought into the field and kept in place for the remainder of the procedure. A 15-blade was used to perform a box annulotomy at C3-C4. The annulus was removed with pituitary and a discectomy was completed with a pituitary and a curette. Once we reached the PLL, I used a high-speed drill to make the endplates coplanar and remove the anterior and posterior osteophytes. I was able then to use a trial to determine the size for the interbody biomechanical device, and while the device was packed with allograft bone, I removed the PLL with a Kerrison #1 followed by Kerrison #2 fully decompress the thecal sac and performed bilateral anterior foraminotomies. Once that area was fully decompressed, the interbody biomechanical device was then inserted at C3-C4, packed with allograft bone for the anterior fusion at C3-C4. I moved my retractor then down and repeated the procedure in the exact same manner at C4-C5, C5-C6 and finally C6-C7. Once all four levels were completed, I irrigated with Irrisept and verified that hemostasis was obtained, selected an appropriate size plate, drilled for the screws bilaterally at C3, C4, C5, C6, C7 locking the screws to the plate with a final locking device. Once these screws were finally locked, final AP and lateral x-rays were taken and saved to PACS. The wound was irrigated with Irrisept once again. A deep drain was placed. The platysma was closed with 2-0 Vicryl and the subcutaneous with 3-0 Vicryl, the skin with a 3-0 running Monocryl and Dermabond. Sterile dressing was applied. The patient was then awakened and transferred to PACU in stable condition. POSTOPERATIVE PLAN:  The patient is going to remain here at least one more night. We are going to give him SCDs and DEJA hoses for DVT prophylaxis and Ancef for infection prophylaxis.         MD IWONA [email or web address]  D:  07/06/2021 17:58  T:  07/06/2021 20:01  JOB #:  [id number]  CC:  [facility]

## 2021-07-07 NOTE — PROGRESS NOTES
End of Shift Note    Bedside shift change report given to Sayda Cabrera (oncoming nurse) by Kamaljit Rodríguez (offgoing nurse). Report included the following information SBAR, Kardex, Intake/Output and MAR    Shift worked:  Night      Shift summary and any significant changes:     Patient complaining of pain despite pain medication. PRN oxycodone and IV dilaudid given. Patient is voiding      Concerns for physician to address:       Zone phone for oncoming shift:   [phone]       Activity:  Activity Level: Up with Assistance  Number times ambulated in hallways past shift: 0  Number of times OOB to chair past shift: 0    Cardiac:   Cardiac Monitoring: No      Cardiac Rhythm: Sinus Tach    Access:   Current line(s): PIV     Genitourinary:   Urinary status: voiding    Respiratory:   O2 Device: Nasal cannula  Chronic home O2 use?: YES  Incentive spirometer at bedside: NO     GI:     Current diet:  ADULT DIET Regular  Passing flatus: YES  Tolerating current diet: YES       Pain Management:   Patient states pain is manageable on current regimen: NO    Skin:  Bryon Score: 20  Interventions: increase time out of bed    Patient Safety:  Fall Score:  Total Score: 2  Interventions: assistive device (walker, cane, etc), gripper socks and pt to call before getting OOB       Length of Stay:  Expected LOS: - - -  Actual LOS: [address]

## 2021-07-07 NOTE — PROGRESS NOTES
ORTHO POST OP SPINE PROGRESS NOTE    2021  Admit Date: 2021  Admit Diagnosis: Cervical spinal stenosis [M48.02]  Procedure: Procedure(s):  CERVICAL 3 TO CERVICAL 7 ANTERIOR CERVICAL DISCECTOMY AND FUSION  Post Op day: 1 Day Post-Op    Subjective:     Stephan Granados is a patient who has complaints of stiffness in the neck s/p C3-7 ACDF. denies pain in arms. tolerating po and able to void. Carlotta Higgins His biggest concern is pain management after surgery. He states he has a note from his current pain mgmt provider from [facility] stating that they will not manage him with medications until September. He states that he was taking oxy 7.5 TID and that not helping with pain. States pain is currently controlled. Was given oxy 10mg yesterday at 2200 and today at 0400. 1mg dilaudid given at 0600  Review of Systems: Pertinent items are noted in HPI. Objective:     PT/OT:   Distance Ambulated:           Time Ambulated (min):        Ambulation Response: Activity Response: Fairly tolerated  Assistive Device:              Assistive Device: Fall prevention device    Vital Signs:    Blood pressure (!) 150/95, pulse (!) 111, temperature 98.3 °F (36.8 °C), resp. rate 18, height 5' 11\" (1.803 m), weight 97.5 kg (215 lb), SpO2 96 %. Temp (24hrs), Av.2 °F (36.8 °C), Min:97.5 °F (36.4 °C), Max:98.7 °F (37.1 °C)      No intake/output data recorded.    1901 -  0700  In: 20 [I.V.:20]  Out: 3045 [Urine:2950; Drains:45]    LAB:    Recent Labs     21  1651   HGB 12.1   WBC 5.1          Wound/Drain Assessment:  Drain:      Dressing:     Physical Exam:  Neurological: no deficit  Incision clean, dry, and intact  5/5 BUE    Assessment:      Patient Active Problem List   Diagnosis Code    Major depressive disorder, recurrent episode, severe (HCC) F33.2    Alcohol dependence ([facility] [address] F10.20    Depression F32.9    Elevated hemoglobin A1c R73.09    Spinal stenosis of lumbar region M48.061    MRSA carrier Z22.200    Spinal stenosis of lumbar region with neurogenic claudication M48.062    Cervical spinal stenosis M48.02       Plan:     Continue PT/OT/Rehab  Discontinue: IV and drain cervical  Consult: PT  and OT    Discharge To: home today. Would continue with current reg pain mgmt (minus iv dilaudid) as it appears to have his pain controlled.

## 2021-07-07 NOTE — PROGRESS NOTES
Pharmacy Medication Reconciliation     The patient was interviewed regarding current PTA medication list, use and drug allergies The patient was questioned regarding use of any other inhalers, topical products, over the counter medications, herbal medications, vitamin products or ophthalmic/nasal/otic medication use. Allergy Update: Chantix [varenicline]    Recommendations/Findings: The following amendments were made to the patient's active medication list on file at [facility]:   1) Additions: Tizanidine, lisinopril    2) Deletions: naltrexone, ear drops, cyclobenzaprine, diazepam, vistaril    3) Changes: none    Pertinent Findings: as noted    -Clarified PTA med list with Mr Eugene Matias and [id number]. PTA medication list was corrected to the following:     Prior to Admission Medications   Prescriptions Last Dose Informant Taking? DULoxetine (CYMBALTA) 60 mg capsule 2021  Yes   Sig: Take 120 mg by mouth daily. Mucinex 600 mg ER tablet 2021  Yes   Si,200 mg two (2) times a day. Oxygen 2021  Yes   Sig: Indications: 3 LITERS NC AT BEDTIME   acetaminophen (TYLENOL) 500 mg tablet 2021  Yes   Sig: TAKE 2 TABLETS BY MOUTH EVERY 6 HOURS AS NEEDED FOR MILD PAIN OR MODERATE PAIN FOR UP TO 10 DAYS   albuterol-ipratropium (DUO-NEB) 2.5 mg-0.5 mg/3 ml nebu 2021  Yes   Sig: 3 mL by Nebulization route every six (6) hours as needed. atorvastatin (LIPITOR) 40 mg tablet 2021  Yes   Sig: Take 40 mg by mouth nightly. baclofen (LIORESAL) 10 mg tablet 2021  Yes   Sig: Take 10 mg by mouth three (3) times daily. budesonide-formoterol (SYMBICORT) 80-4.5 mcg/actuation HFAA 2021  Yes   Sig: Take 2 Puffs by inhalation two (2) times a day. Indications: BRONCHOSPASM PREVENTION WITH COPD   busPIRone (BUSPAR) 15 mg tablet 2021  Yes   Sig: Take 1 Tab by mouth three (3) times daily. Indications: Generalized Anxiety Disorder   Patient taking differently: Take 30 mg by mouth two (2) times a day. Indications: repeated episodes of anxiety   cyanocobalamin (Vitamin B-12) 1,000 mcg tablet 2021  Yes   Sig: Take 1,000 mcg by mouth daily. diclofenac potassium (CATAFLAM) 50 mg tablet 2021  Yes   Sig: Take 50 mg by mouth two (2) times daily as needed. ergocalciferol (ERGOCALCIFEROL) 1,250 mcg (50,000 unit) capsule 2021  Yes   Sig: TAKE 1 CAPSULE BY ORAL ROUTE WEEKLY FOR 90 DAYS   ferrous sulfate 325 mg (65 mg iron) tablet 2021  Yes   Sig: Take  by mouth Daily (before breakfast). fluticasone propionate (FLONASE) 50 mcg/actuation nasal spray 2021  Yes   Sig: INSTILL 2 SPRAYS IN THE NOSTRILS DAILY   gabapentin (NEURONTIN) 800 mg tablet 2021  Yes   Sig: Take 800 mg by mouth three (3) times daily. hydrOXYzine HCL (ATARAX) 25 mg tablet 2021  Yes   Sig: Take  by mouth three (3) times daily as needed. ipratropium (ATROVENT) 42 mcg (0.06 %) nasal spray 2021  Yes   Si Spray by Nasal route three (3) times daily. 1-2 squirts in the nostrils   levETIRAcetam (KEPPRA) 1,000 mg tablet 2021  Yes   Sig: Take 1 Tab by mouth two (2) times a day. Indications: PARTIAL EPILEPSY TREATMENT ADJUNCT   lisinopriL (PRINIVIL, ZESTRIL) 10 mg tablet   Yes   Sig: Take 20 mg by mouth daily. metoprolol succinate (TOPROL-XL) 25 mg XL tablet 2021  Yes   Sig: Take 25 mg by mouth daily. montelukast (SINGULAIR) 10 mg tablet 2021  Yes   Sig: Take 10 mg by mouth nightly. ondansetron hcl (ZOFRAN) 4 mg tablet 2021  Yes   Sig: Take 4 mg by mouth every six (6) hours as needed for Nausea. oxyCODONE IR (ROXICODONE) 5 mg immediate release tablet 2021  Yes   Sig: Take 7.5 mg by mouth three (3) times daily as needed for Pain.   pantoprazole (PROTONIX) 40 mg tablet 2021  Yes   Sig: Take 1 Tab by mouth Before breakfast and dinner. Patient taking differently: Take 40 mg by mouth daily.    pyridoxine, vitamin B6, (VITAMIN B-6) 50 mg tablet 2021  Yes   Sig: Take 50 mg by mouth two (2) times a day. risperiDONE (RisperDAL) 1 mg tablet 7/5/2021  Yes   Sig: Take 1 mg by mouth three (3) times daily. senna (Senexon) 8.6 mg tablet 7/5/2021  Yes   Sig: Take 2 Tabs by mouth two (2) times a day. tiZANidine (ZANAFLEX) 4 mg tablet   Yes   Sig: Take 4 mg by mouth every eight (8) hours as needed for Muscle Spasm(s). tiotropium (SPIRIVA) 18 mcg inhalation capsule 7/5/2021  Yes   Sig: Take 1 Cap by inhalation daily. Patient taking differently: Take 1 Capsule by inhalation two (2) times a day. traZODone (DESYREL) 100 mg tablet 7/5/2021  Yes   Sig: Take 100 mg by mouth nightly.  Take 2-3 tablets      Facility-Administered Medications: None        Thank you,  Luis Liriano Augusta

## 2021-07-08 LAB
ALBUMIN SERPL-MCNC: 3.2 G/DL (ref 3.5–5)
ALBUMIN/GLOB SERPL: 0.8 {RATIO} (ref 1.1–2.2)
ALP SERPL-CCNC: 88 U/L (ref 45–117)
ALT SERPL-CCNC: 18 U/L (ref 12–78)
ANION GAP SERPL CALC-SCNC: 6 MMOL/L (ref 5–15)
AST SERPL-CCNC: 15 U/L (ref 15–37)
BILIRUB SERPL-MCNC: 0.9 MG/DL (ref 0.2–1)
BUN SERPL-MCNC: 6 MG/DL (ref 6–20)
BUN/CREAT SERPL: 8 (ref 12–20)
CALCIUM SERPL-MCNC: 8.9 MG/DL (ref 8.5–10.1)
CHLORIDE SERPL-SCNC: 106 MMOL/L (ref 97–108)
CO2 SERPL-SCNC: 26 MMOL/L (ref 21–32)
CREAT SERPL-MCNC: 0.72 MG/DL (ref 0.7–1.3)
ERYTHROCYTE [DISTWIDTH] IN BLOOD BY AUTOMATED COUNT: 13.9 % (ref 11.5–14.5)
GLOBULIN SER CALC-MCNC: 4.1 G/DL (ref 2–4)
GLUCOSE SERPL-MCNC: 115 MG/DL (ref 65–100)
HCT VFR BLD AUTO: 36.1 % (ref 36.6–50.3)
HGB BLD-MCNC: 11.7 G/DL (ref 12.1–17)
MCH RBC QN AUTO: 26.7 PG (ref 26–34)
MCHC RBC AUTO-ENTMCNC: 32.4 G/DL (ref 30–36.5)
MCV RBC AUTO: 82.4 FL (ref 80–99)
NRBC # BLD: 0 K/UL (ref 0–0.01)
NRBC BLD-RTO: 0 PER 100 WBC
PLATELET # BLD AUTO: 220 K/UL (ref 150–400)
PMV BLD AUTO: 9.2 FL (ref 8.9–12.9)
POTASSIUM SERPL-SCNC: 3.3 MMOL/L (ref 3.5–5.1)
PROT SERPL-MCNC: 7.3 G/DL (ref 6.4–8.2)
RBC # BLD AUTO: 4.38 M/UL (ref 4.1–5.7)
SODIUM SERPL-SCNC: 138 MMOL/L (ref 136–145)
TROPONIN I SERPL-MCNC: <0.05 NG/ML
WBC # BLD AUTO: 10.8 K/UL (ref 4.1–11.1)

## 2021-07-08 PROCEDURE — 94760 N-INVAS EAR/PLS OXIMETRY 1: CPT

## 2021-07-08 PROCEDURE — 74011250637 HC RX REV CODE- 250/637: Performed by: NURSE PRACTITIONER

## 2021-07-08 PROCEDURE — 74011000258 HC RX REV CODE- 258: Performed by: INTERNAL MEDICINE

## 2021-07-08 PROCEDURE — 84484 ASSAY OF TROPONIN QUANT: CPT

## 2021-07-08 PROCEDURE — 85027 COMPLETE CBC AUTOMATED: CPT

## 2021-07-08 PROCEDURE — 36415 COLL VENOUS BLD VENIPUNCTURE: CPT

## 2021-07-08 PROCEDURE — 94640 AIRWAY INHALATION TREATMENT: CPT

## 2021-07-08 PROCEDURE — 74011250637 HC RX REV CODE- 250/637: Performed by: INTERNAL MEDICINE

## 2021-07-08 PROCEDURE — 74011000250 HC RX REV CODE- 250: Performed by: INTERNAL MEDICINE

## 2021-07-08 PROCEDURE — 74011250637 HC RX REV CODE- 250/637: Performed by: ORTHOPAEDIC SURGERY

## 2021-07-08 PROCEDURE — 74011000250 HC RX REV CODE- 250: Performed by: NURSE PRACTITIONER

## 2021-07-08 PROCEDURE — 80053 COMPREHEN METABOLIC PANEL: CPT

## 2021-07-08 PROCEDURE — 65270000029 HC RM PRIVATE

## 2021-07-08 PROCEDURE — 74011250636 HC RX REV CODE- 250/636: Performed by: INTERNAL MEDICINE

## 2021-07-08 RX ORDER — RISPERIDONE 1 MG/1
0.5 TABLET, FILM COATED ORAL 3 TIMES DAILY
Status: DISCONTINUED | OUTPATIENT
Start: 2021-07-08 | End: 2021-07-14 | Stop reason: HOSPADM

## 2021-07-08 RX ORDER — POTASSIUM CHLORIDE 750 MG/1
40 TABLET, FILM COATED, EXTENDED RELEASE ORAL
Status: COMPLETED | OUTPATIENT
Start: 2021-07-08 | End: 2021-07-08

## 2021-07-08 RX ORDER — TRAZODONE HYDROCHLORIDE 100 MG/1
100 TABLET ORAL
Status: DISCONTINUED | OUTPATIENT
Start: 2021-07-08 | End: 2021-07-14 | Stop reason: HOSPADM

## 2021-07-08 RX ORDER — BUSPIRONE HYDROCHLORIDE 5 MG/1
15 TABLET ORAL 3 TIMES DAILY
Status: DISCONTINUED | OUTPATIENT
Start: 2021-07-08 | End: 2021-07-14 | Stop reason: HOSPADM

## 2021-07-08 RX ADMIN — RISPERIDONE 0.5 MG: 1 TABLET, FILM COATED ORAL at 21:31

## 2021-07-08 RX ADMIN — Medication 10 ML: at 06:02

## 2021-07-08 RX ADMIN — GABAPENTIN 800 MG: 100 CAPSULE ORAL at 17:41

## 2021-07-08 RX ADMIN — LISINOPRIL 20 MG: 20 TABLET ORAL at 10:08

## 2021-07-08 RX ADMIN — LORAZEPAM 2 MG: 2 INJECTION INTRAMUSCULAR; INTRAVENOUS at 00:26

## 2021-07-08 RX ADMIN — ARFORMOTEROL TARTRATE: 15 SOLUTION RESPIRATORY (INHALATION) at 07:23

## 2021-07-08 RX ADMIN — METOPROLOL SUCCINATE 25 MG: 25 TABLET, EXTENDED RELEASE ORAL at 10:08

## 2021-07-08 RX ADMIN — LABETALOL HYDROCHLORIDE 10 MG: 5 INJECTION INTRAVENOUS at 00:51

## 2021-07-08 RX ADMIN — BUSPIRONE HYDROCHLORIDE 15 MG: 5 TABLET ORAL at 17:38

## 2021-07-08 RX ADMIN — MONTELUKAST 10 MG: 10 TABLET, FILM COATED ORAL at 21:32

## 2021-07-08 RX ADMIN — Medication 1 AMPULE: at 20:19

## 2021-07-08 RX ADMIN — Medication 10 ML: at 06:03

## 2021-07-08 RX ADMIN — Medication 1 AMPULE: at 10:15

## 2021-07-08 RX ADMIN — IPRATROPIUM BROMIDE 1 SPRAY: 42 SPRAY NASAL at 10:19

## 2021-07-08 RX ADMIN — OXYCODONE 10 MG: 5 TABLET ORAL at 21:39

## 2021-07-08 RX ADMIN — CARBAMIDE PEROXIDE 6.5% 5 DROP: 6.5 LIQUID AURICULAR (OTIC) at 10:16

## 2021-07-08 RX ADMIN — ACETAMINOPHEN 1000 MG: 500 TABLET ORAL at 00:26

## 2021-07-08 RX ADMIN — ACETAMINOPHEN 1000 MG: 500 TABLET ORAL at 11:49

## 2021-07-08 RX ADMIN — IPRATROPIUM BROMIDE 1 SPRAY: 42 SPRAY NASAL at 00:26

## 2021-07-08 RX ADMIN — ACETAMINOPHEN 1000 MG: 500 TABLET ORAL at 22:24

## 2021-07-08 RX ADMIN — OXYCODONE 5 MG: 5 TABLET ORAL at 15:22

## 2021-07-08 RX ADMIN — RISPERIDONE 0.5 MG: 1 TABLET, FILM COATED ORAL at 17:38

## 2021-07-08 RX ADMIN — LORAZEPAM 2 MG: 2 INJECTION INTRAMUSCULAR; INTRAVENOUS at 06:02

## 2021-07-08 RX ADMIN — FLUTICASONE PROPIONATE 2 SPRAY: 50 SPRAY, METERED NASAL at 10:19

## 2021-07-08 RX ADMIN — ATORVASTATIN CALCIUM 40 MG: 40 TABLET, FILM COATED ORAL at 21:32

## 2021-07-08 RX ADMIN — CARBAMIDE PEROXIDE 6.5% 5 DROP: 6.5 LIQUID AURICULAR (OTIC) at 17:46

## 2021-07-08 RX ADMIN — POTASSIUM CHLORIDE 40 MEQ: 750 TABLET, FILM COATED, EXTENDED RELEASE ORAL at 10:07

## 2021-07-08 RX ADMIN — Medication 10 ML: at 21:50

## 2021-07-08 RX ADMIN — BUSPIRONE HYDROCHLORIDE 15 MG: 5 TABLET ORAL at 21:32

## 2021-07-08 RX ADMIN — GABAPENTIN 800 MG: 100 CAPSULE ORAL at 21:31

## 2021-07-08 RX ADMIN — ARFORMOTEROL TARTRATE: 15 SOLUTION RESPIRATORY (INHALATION) at 21:55

## 2021-07-08 RX ADMIN — IPRATROPIUM BROMIDE 1 SPRAY: 42 SPRAY NASAL at 21:33

## 2021-07-08 RX ADMIN — IPRATROPIUM BROMIDE 1 SPRAY: 42 SPRAY NASAL at 17:46

## 2021-07-08 RX ADMIN — TRAZODONE HYDROCHLORIDE 100 MG: 100 TABLET ORAL at 21:32

## 2021-07-08 RX ADMIN — LEVETIRACETAM 1000 MG: 500 TABLET ORAL at 10:08

## 2021-07-08 RX ADMIN — Medication 10 ML: at 21:43

## 2021-07-08 RX ADMIN — Medication 10 ML: at 13:49

## 2021-07-08 RX ADMIN — LEVETIRACETAM 1000 MG: 100 INJECTION, SOLUTION INTRAVENOUS at 21:43

## 2021-07-08 RX ADMIN — LABETALOL HYDROCHLORIDE 10 MG: 5 INJECTION INTRAVENOUS at 11:49

## 2021-07-08 RX ADMIN — LORAZEPAM 2 MG: 2 INJECTION INTRAMUSCULAR; INTRAVENOUS at 01:48

## 2021-07-08 RX ADMIN — ACETAMINOPHEN 1000 MG: 500 TABLET ORAL at 06:02

## 2021-07-08 RX ADMIN — PANTOPRAZOLE SODIUM 40 MG: 40 TABLET, DELAYED RELEASE ORAL at 15:22

## 2021-07-08 RX ADMIN — ACETAMINOPHEN 1000 MG: 500 TABLET ORAL at 17:44

## 2021-07-08 RX ADMIN — OXYCODONE 5 MG: 5 TABLET ORAL at 18:25

## 2021-07-08 RX ADMIN — PYRIDOXINE HCL TAB 50 MG 50 MG: 50 TAB at 17:44

## 2021-07-08 NOTE — PROGRESS NOTES
End of Shift Note    Bedside shift change report given to Cristina (oncoming nurse) by Carmelita Jeter (offgoing nurse). Report included the following information SBAR, Kardex, Intake/Output and MAR    Shift worked:  Night     Shift summary and any significant changes:     CIWA score still elevated, IV ativan given x5. IV labetalol given x1. Concerns for physician to address:       Zone phone for oncoming shift:          Activity:  Activity Level: Up with Assistance  Number times ambulated in hallways past shift: 0  Number of times OOB to chair past shift: 0    Cardiac:   Cardiac Monitoring: Yes      Cardiac Rhythm: Sinus Tach    Access:   Current line(s): PIV     Genitourinary:   Urinary status: voiding    Respiratory:   O2 Device: None (Room air)  Chronic home O2 use?: YES  Incentive spirometer at bedside: YES     GI:  Last Bowel Movement Date: 07/07/21 (reports it, not seen)  Current diet:  ADULT DIET Regular  Passing flatus: YES  Tolerating current diet: YES       Pain Management:   Patient states pain is manageable on current regimen: YES    Skin:  Bryon Score: 20  Interventions: float heels and increase time out of bed    Patient Safety:  Fall Score:  Total Score: 2  Interventions: bed/chair alarm, assistive device (walker, cane, etc), gripper socks and pt to call before getting OOB  High Fall Risk: Yes    Length of Stay:  Expected LOS: - - -  Actual LOS: Ofelia [address] 34

## 2021-07-08 NOTE — PROGRESS NOTES
ADULT PROTOCOL: JET AEROSOL ASSESSMENT    Patient  Jethro Ackerman     62 y.o.   male     7/8/2021  5:52 AM    Breath Sounds Pre Procedure: Right Breath Sounds: Diminished                               Left Breath Sounds: Diminished                                        Breathing pattern: Pre procedure Breathing Pattern: Regular          Post procedure Breathing Pattern: Regular    Heart Rate: Pre procedure Pulse: 102           Post procedure Pulse: 104    Resp Rate: Pre procedure Respirations: 16           Post procedure Respirations: 16          Cough: Pre procedure Cough: Non-productive               Post procedure Cough: Non-productive                     Oxygen: O2 Device: None (Room air)   FiO2 (%) 21%  room air     Changed: NO    SpO2: Pre procedure SpO2: 97 %   without oxygen              Post procedure SpO2: 97 %  without oxygen    Nebulizer Therapy: Current medications Aerosolized Medications: Brovana, Pulmicort      Changed: NO        Problem List:   Patient Active Problem List   Diagnosis Code    Major depressive disorder, recurrent episode, severe (HCC) F33.2    Alcohol dependence ([facility] [address] F10.20    Depression F32.9    Elevated hemoglobin A1c R73.09    Spinal stenosis of lumbar region M48.061    MRSA carrier Z22.322    Spinal stenosis of lumbar region with neurogenic claudication M48.062    Cervical spinal stenosis M48.02       Respiratory Therapist: Jeffrey Brown RT

## 2021-07-08 NOTE — PROGRESS NOTES
ORTHO POST OP SPINE PROGRESS NOTE    2021  Admit Date: 2021  Admit Diagnosis: Cervical spinal stenosis [M48.02]  Procedure: Procedure(s):  CERVICAL 3 TO CERVICAL 7 ANTERIOR CERVICAL DISCECTOMY AND FUSION  Post Op day: 2 Days Post-Op    Subjective:     Abdoulaye Villanueva is a patient who has complaints of neck pain and some diffuse UE pain s/p C3-7 ACDF. progression with PT yesterday. pt has assistance at home. pt much more sedated this monrning. needing multiple cues to answer questions. etoh hx, states he has '1 beer a day'. Has been seen by hospitalist and alcohol withdrawal protocol started with ativan. Review of Systems: Pertinent items are noted in HPI. Objective:     PT/OT:   Distance Ambulated:           Time Ambulated (min):        Ambulation Response: Activity Response: Fairly tolerated  Assistive Device:              Assistive Device: Fall prevention device    Vital Signs:    Blood pressure (!) 164/97, pulse (!) 106, temperature 98.6 °F (37 °C), resp. rate 18, height 5' 11\" (1.803 m), weight 97.5 kg (215 lb), SpO2 90 %. Temp (24hrs), Av.2 °F (36.8 °C), Min:97.4 °F (36.3 °C), Max:99.5 °F (37.5 °C)      No intake/output data recorded.    1901 -  0700  In: 360 [P.O.:360]  Out: 1145 [Urine:1100; Drains:45]    LAB:    Recent Labs     21  0013   HGB 11.7*   WBC 10.8          Wound/Drain Assessment:  Drain:      Dressing:     Physical Exam:  Neurological: no deficit  Incision clean, dry, and intact  5/5 BUE    Assessment:      Patient Active Problem List   Diagnosis Code    Major depressive disorder, recurrent episode, severe (HCC) F33.2    Alcohol dependence ([facility] [address] F10.20    Depression F32.9    Elevated hemoglobin A1c R73.09    Spinal stenosis of lumbar region M48.061    MRSA carrier Z22.322    Spinal stenosis of lumbar region with neurogenic claudication M48.062    Cervical spinal stenosis M48.02       Plan:     Continue PT/OT/Rehab  Discontinue:   Consult: PT  and OT    Discharge To: home v rehab   concern for etoh withdrawal. Has ativan ordered and eval by Dr. Hussein Bellamy.  Appreciate assistance  Home v rehab when medically stable

## 2021-07-08 NOTE — PROGRESS NOTES
Hospitalist Progress Note    NAME: Louise Courser   :  1960   MRN:  [id number]       Assessment / Plan:  Cervical stenosis s/p anterior cervical disc fusion  -Postoperative care per neurosurgery. Cervical collar in place.  -Pain control being managed by orthopedics    Hypertension uncontrolled  -Continue home metoprolol. Stop IV fluids. Pain control should help with his blood pressure. Chronic alcoholism with concern for alcohol withdrawal  -We will continue alcohol withdrawal protocol with Ativan  -Continue multivitamins  -Received 10 mg of Ativan overnight     Chronic respiratory failure with hypoxia on home oxygen  Emphysema  History of tobacco abuse  Abnormal CT of the chest  TANIA not on CPAP  CT of the chest showed   Suspected sarcoidosis  1. Bilateral parenchymal scarring and traction bronchiectasis without evidence  of honeycombing. Given the upper lobe predominance of findings and multiple  calcified lymph nodes, this may be due to sarcoidosis. 2. 9.3 mm left upper lobe pulmonary nodule with internal central calcification,  likely a granuloma in this patient with multiple calcified mediastinal lymph  nodes. This could be followed up with CT scan. 3. Emphysema. Continue with home inhalers with pharmacy substitution, continue with nebs  Continue with montelukast  -Will arrange out pt f/u with pulmonary       History of seizure disorder  gerd  Patient reported that he had seizures as a side effect of Chantix  He has been on Keppra for the last 7 years, he supposed to take Keppra 1000 twice daily but he only takes it daily  Cont keppra but change to IV  Continue Protonix    Depression  Anxiety  Continue with risperidone, trazodone  Continue BuSpar, Cymbalta        Code Status: Full  DVT Prophylaxis: Per surgery         Body mass index is 29.99 kg/m².     Code status: Full  Prophylaxis: Per primary, suggest using SCDs which were Steven Done note she does not have cell phone but  Recommended Disposition: Home w/Family     Subjective:     Chief Complaint / Reason for Physician Visit  He was agitated this a.m. and received Ativan and now drowsy  Wakes up to commands but falls back to sleep right away  Tachycardic  Blood pressure is slightly better        Review of Systems:  Symptom Y/N Comments  Symptom Y/N Comments   Fever/Chills    Chest Pain     Poor Appetite    Edema     Cough    Abdominal Pain     Sputum    Joint Pain     SOB/YOUNG    Pruritis/Rash     Nausea/vomit    Tolerating PT/OT     Diarrhea    Tolerating Diet     Constipation    Other       Could NOT obtain due to:      Objective:     VITALS:   Last 24hrs VS reviewed since prior progress note. Most recent are:  Patient Vitals for the past 24 hrs:   Temp Pulse Resp BP SpO2   07/08/21 1508 98.2 °F (36.8 °C) (!) 104 18 134/84 96 %   07/08/21 1140 98.4 °F (36.9 °C) (!) 105 18 (!) 152/104 96 %   07/08/21 0833 98.6 °F (37 °C) (!) 109 14 120/85 96 %   07/08/21 0724     90 %   07/08/21 0353 98.6 °F (37 °C) (!) 106 18 (!) 164/97 97 %   07/08/21 0223    (!) 130/91    07/08/21 0143  (!) 103  (!) 156/99    07/08/21 0051  (!) 102  (!) 162/105    07/08/21 0050    (!) 162/105    07/07/21 2326 99.5 °F (37.5 °C) (!) 105 19 (!) 163/95 99 %   07/07/21 2047     97 %   07/07/21 1928 98.3 °F (36.8 °C) (!) 102 20 (!) 155/94 97 %   07/07/21 1719  (!) 101 20 (!) 148/88 100 %   07/07/21 1648 97.9 °F (36.6 °C) 99 20 (!) 177/99 98 %   07/07/21 1617 98.4 °F (36.9 °C) 96 20 (!) 179/107 95 %     No intake or output data in the 24 hours ending 07/08/21 1548     PHYSICAL EXAM:  General: no acute distress    EENT:  EOMI. Anicteric sclerae. MMM, cervical collar present  Resp:  CTA bilaterally, no wheezing or rales. No accessory muscle use  CV:  Regular  rhythm,  No edema  GI:  Soft, Non distended, Non tender.  +Bowel sounds  Neurologic:  Drowsy but wakes up to commands  Psych:   Drowsy  Skin:  No rashes.   No jaundice    Reviewed most current lab test results and cultures  YES  Reviewed most current radiology test results   YES  Review and summation of old records today    NO  Reviewed patient's current orders and MAR    YES  PMH/SH reviewed - no change compared to H&P          Current Facility-Administered Medications:     levETIRAcetam (KEPPRA) 1,000 mg in 0.9% sodium chloride 100 mL IVPB, 1,000 mg, IntraVENous, Q12H, Christian Dee MD    busPIRone (BUSPAR) tablet 15 mg, 15 mg, Oral, TID, Christian Dee MD    risperiDONE (RisperDAL) tablet 0.5 mg, 0.5 mg, Oral, TID, Christian Dee MD    traZODone (DESYREL) tablet 100 mg, 100 mg, Oral, QHS, Christian Dee MD    phenol throat spray (CHLORASEPTIC) 1 Spray, 1 [place], Oral, PRN, Stapleton Golden, NP, 1 [place] at 07/07/21 1017    fluticasone propionate (FLONASE) 50 mcg/actuation nasal spray 2 Spray, 2 Spray, Both Nostrils, DAILY, Collette Cabrera, NP, 2 Spray at 07/08/21 1019    ipratropium (ATROVENT) 42 mcg (0.06 %) nasal spray 1 Spray, 1 Spray, Both Nostrils, TID, Collette Cabrera, NP, 1 Spray at 07/08/21 1019    metoprolol succinate (TOPROL-XL) XL tablet 25 mg, 25 mg, Oral, DAILY, Srinivas Dee MD, 25 mg at 07/08/21 1008    LORazepam (ATIVAN) injection 2 mg, 2 mg, IntraVENous, Q1H PRN, Sina Martínez MD, 2 mg at 07/08/21 0602    LORazepam (ATIVAN) injection 4 mg, 4 mg, IntraVENous, Q1H PRN, Christian Anne MD    folic acid (FOLVITE) tablet 1 mg, 1 mg, Oral, DAILY, Christian Dee MD    thiamine mononitrate (B-1) tablet 100 mg, 100 mg, Oral, DAILY, Ken Falcon MD    arformoterol 15 mcg/budesonide 0.5 mg neb solution, , Nebulization, BID, Daya Zimmerman NP, Given at 07/08/21 3015    DULoxetine (CYMBALTA) capsule 120 mg, 120 mg, Oral, DAILY, Daya Zimmerman NP    montelukast (SINGULAIR) tablet 10 mg, 10 mg, Oral, QHS, Oscar KANG, NP, 10 mg at 07/07/21 2212    atorvastatin (LIPITOR) tablet 40 mg, 40 mg, Oral, QHS, Daya Zimmerman NP, 40 mg at 07/07/21 2038    labetaloL (NORMODYNE;TRANDATE) injection 10 mg, 10 mg, IntraVENous, Q4H PRN, Tami CHI MD, 10 mg at 07/08/21 1149    lisinopriL (PRINIVIL, ZESTRIL) tablet 20 mg, 20 mg, Oral, DAILY, Srinivas Dee MD, 20 mg at 07/08/21 1008    gabapentin (NEURONTIN) capsule 800 mg, 800 mg, Oral, QID, Xochitl ARTHUR MD, 800 mg at 07/07/21 2212    cyanocobalamin (VITAMIN B12) tablet 1,000 mcg, 1,000 mcg, Oral, DAILY, Xochitl ARTHUR MD, 1,000 mcg at 07/07/21 9382    carbamide peroxide (DEBROX) 6.5 % otic solution 5 Drop, 5 Drop, Both Ears, BID, Xochitl ARTHUR MD, 5 Drop at 07/08/21 1016    [START ON 7/12/2021] ergocalciferol capsule 50,000 Units, 50,000 Units, Oral, Q7D, Jessica Thapa MD    ferrous sulfate tablet 325 mg, 1 Tablet, Oral, ACB, Xochitl ARTHUR MD, 325 mg at 07/07/21 1526    guaiFENesin ER (MUCINEX) tablet 1,200 mg, 1,200 mg, Oral, BID, Xochitl ARTHUR MD, 1,200 mg at 07/07/21 1721    pantoprazole (PROTONIX) tablet 40 mg, 40 mg, Oral, ACB&D, Jessica Thapa MD, 40 mg at 07/08/21 1522    pyridoxine (vitamin B6) (VITAMIN B-6) tablet 50 mg, 50 mg, Oral, BID, Xochitl ARTHUR MD, 50 mg at 07/07/21 1721    ipratropium (ATROVENT) 0.02 % nebulizer solution 0.5 mg, 0.5 mg, Nebulization, Q6H PRN, Jessica Thapa MD    sodium chloride (NS) flush 5-40 mL, 5-40 mL, IntraVENous, Q8H, Ken Olmstead MD, 10 mL at 07/08/21 1349    sodium chloride (NS) flush 5-40 mL, 5-40 mL, IntraVENous, PRN, Jessica Thapa MD    acetaminophen (TYLENOL) tablet 1,000 mg, 1,000 mg, Oral, Q6H, AyshaKen hirsch MD, 1,000 mg at 07/08/21 1149    oxyCODONE IR (ROXICODONE) tablet 5 mg, 5 mg, Oral, Q3H PRN, Xochitl ARTHUR MD, 5 mg at 07/08/21 1522    oxyCODONE IR (ROXICODONE) tablet 10 mg, 10 mg, Oral, Q3H PRN, Jessica Thapa MD, 10 mg at 07/07/21 1536    hydrOXYzine HCL (ATARAX) tablet 10 mg, 10 mg, Oral, Q8H PRN, Jessica Thapa MD    senna-docusate (PERICOLACE) 8.6-50 mg per tablet 1 Tablet, 1 Tablet, Oral, BID, Jessica Thapa MD  [facility] polyethylene glycol (MIRALAX) packet 17 g, 17 g, Oral, DAILY, Doug Soliman MD    bisacodyL (DULCOLAX) suppository 10 mg, 10 mg, Rectal, DAILY PRN, Roxie Vega MD    benzocaine-menthoL (CHLORASEPTIC MAX) lozenge 1 Lozenge, 1 Lozenge, Oral, PRN, Roxie Vega MD, 1 Lozenge at 07/06/21 2203    diazePAM (VALIUM) tablet 5 mg, 5 mg, Oral, Q6H PRN, Rowland Bernheim A, MD, 5 mg at 07/07/21 1017    cyclobenzaprine (FLEXERIL) tablet 10 mg, 10 mg, Oral, BID PRN, Roxie Vega MD    albuterol-ipratropium (DUO-NEB) 2.5 MG-0.5 MG/3 ML, 3 mL, Nebulization, Q4H PRN, Roxie Vega MD    alcohol 62% (NOZIN) nasal  1 Ampule, 1 Ampule, Topical, Q12H, Rowland Bernheim A, MD, 1 Ampule at 07/08/21 1015    sodium chloride (NS) flush 5-40 mL, 5-40 mL, IntraVENous, Q8H, Cesar Koroma PA-C, 10 mL at 07/08/21 1349    sodium chloride (NS) flush 5-40 mL, 5-40 mL, IntraVENous, PRN, Cesar Koroma PA-C    naloxone [facility]) injection 0.4 mg, 0.4 mg, IntraVENous, PRN, Alonzo Benedict PA-C  ________________________________________________________________________  Care Plan discussed with:    Comments   Patient y    Family      RN y    Care Manager     Consultant                        Multidiciplinary team rounds were held today with , nursing, pharmacist and clinical coordinator. Patient's plan of care was discussed; medications were reviewed and discharge planning was addressed. ________________________________________________________________________  Total NON critical care TIME:  35   Minutes    Total CRITICAL CARE TIME Spent:   Minutes non procedure based      Comments   >50% of visit spent in counseling and coordination of care     ________________________________________________________________________  Abebe Horne MD     Procedures: see electronic medical records for all procedures/Xrays and details which were not copied into this note but were reviewed prior to creation of Plan.       LABS:  I reviewed today's most current labs and imaging studies.   Pertinent labs include:  Recent Labs     07/08/21  0013 07/05/21  1651   WBC 10.8 5.1   HGB 11.7* 12.1   HCT 36.1* 38.2    194     Recent Labs     07/08/21  0013 07/06/21  0404 07/05/21  1651    140 137   K 3.3* 4.1 4.2    108 102   CO2 26 29 31   * 76 90   BUN 6 18 20   CREA 0.72 1.00 1.19   CA 8.9 8.9 9.3   ALB 3.2* 3.0* 3.6   TBILI 0.9 0.4 0.4   ALT 18 20 23   INR  --  1.0  --        Signed: Yesica Akins MD

## 2021-07-08 NOTE — PROGRESS NOTES
Occupational Therapy    Chart reviewed in prep for skilled OT treatment, with pt initially agreeable to ADL treatment; however, session stopped secondary to pt unable to remain awake and frequently nodding off while conversing with OT. Will abort treatment and follow up later as able and appropriate.     Thank you,  Michelle Blackwell, OT    Time Spent: 9 mins

## 2021-07-08 NOTE — PROGRESS NOTES
Ortho / Neurosurgery NP Note    POD# 2  s/p CERVICAL 3 TO CERVICAL 7 ANTERIOR CERVICAL DISCECTOMY AND FUSION   Pt seen with no visitor present. RN at bedside. Pt seen sitting on bsc. Very drowsy, unable to stay awake or hold conversation. CIWA started yesterday afternoon for symptoms  of ETOH withdrawal   Subjective assessment limited by patient's current states of drowsiness. Of note for CIWA protocol, patient had intermittent involuntary tremors of BUE and BLE prior to surgery and he had stated daily PTA for which he takes baclofen prn. VSS Afebrile. Room air. Wears 3L O2 with exertion at baseline   Mild Tachycardia - -111  Remote telemetry - Sinus Tach no ectopy/alarms overnight     Visit Vitals  /85 (BP 1 Location: Right arm, BP Patient Position: Sitting)   Pulse (!) 109   Temp 98.6 °F (37 °C)   Resp 14   Ht 5' 11\" (1.803 m)   Wt 97.5 kg (215 lb)   SpO2 96%   BMI 29.99 kg/m²       Voiding status: voiding   Output (mL)  Urine Voided: 350 ml (07/07/21 0842)  Last Bowel Movement Date: 07/08/21 (07/08/21 0844)      Labs    Lab Results   Component Value Date/Time    HGB 11.7 (L) 07/08/2021 12:13 AM      Lab Results   Component Value Date/Time    INR 1.0 07/06/2021 04:04 AM      Lab Results   Component Value Date/Time    Sodium 138 07/08/2021 12:13 AM    Potassium 3.3 (L) 07/08/2021 12:13 AM    Chloride 106 07/08/2021 12:13 AM    CO2 26 07/08/2021 12:13 AM    Glucose 115 (H) 07/08/2021 12:13 AM    BUN 6 07/08/2021 12:13 AM    Creatinine 0.72 07/08/2021 12:13 AM    Calcium 8.9 07/08/2021 12:13 AM     Recent Glucose Results:   Lab Results   Component Value Date/Time     (H) 07/08/2021 12:13 AM           Body mass index is 29.99 kg/m². : A BMI > 30 is classified as obesity and > 40 is classified as morbid obesity. Prineo dressing c.d.i  MADI drain removed 7/7, gauze c/d/i  Calves soft and supple;  No pain with passive stretch  Sensation and motor intact - FUNES, assessment limited 2/2 sedation   SCDs for mechanical DVT proph while in bed     PLAN:  1) Neurovascular assessment q4 hours   2) PT/OT - Hard cervical collar. 3) Pain control - scheduled tylenol, prn oxycodone. Limit narcotic use until more alert. 4) HTN with tachycardia - PTA med list not updated on admission, pharmacy completed med rec 7/7, metoprolol resumed. IM following, appreciate assistance. ETOH withdrawal likely contributing. 5) ETOH abuse - [place] protocol. 6) Discharge planning - lives alone with CNA who helps with IADLs, relys on local [employer] friends for additional support, uses medicaid transportation. He cleared therapy on POD1 and would benefit from [facility] to continue PT/OT. Discharge pending medical stability.      Tiesha Rain NP

## 2021-07-08 NOTE — PROGRESS NOTES
Patient moving slowly with delayed responses at start of shift due to ativan overnight. Ramon Ureña, NP aware. Patient more responsive now. Will try to administer PO meds. Administer keppra, lisinopril, metoprolol, and potassium before patient became drowsy again. . Hold rest of PO meds per Dr. Sheri Pressley .

## 2021-07-09 LAB
ALBUMIN SERPL-MCNC: 3 G/DL (ref 3.5–5)
ALBUMIN/GLOB SERPL: 0.7 {RATIO} (ref 1.1–2.2)
ALP SERPL-CCNC: 83 U/L (ref 45–117)
ALT SERPL-CCNC: 19 U/L (ref 12–78)
ANION GAP SERPL CALC-SCNC: 5 MMOL/L (ref 5–15)
AST SERPL-CCNC: 15 U/L (ref 15–37)
BILIRUB SERPL-MCNC: 0.7 MG/DL (ref 0.2–1)
BUN SERPL-MCNC: 7 MG/DL (ref 6–20)
BUN/CREAT SERPL: 9 (ref 12–20)
CALCIUM SERPL-MCNC: 9.1 MG/DL (ref 8.5–10.1)
CHLORIDE SERPL-SCNC: 108 MMOL/L (ref 97–108)
CO2 SERPL-SCNC: 27 MMOL/L (ref 21–32)
CREAT SERPL-MCNC: 0.8 MG/DL (ref 0.7–1.3)
ERYTHROCYTE [DISTWIDTH] IN BLOOD BY AUTOMATED COUNT: 14 % (ref 11.5–14.5)
GLOBULIN SER CALC-MCNC: 4.4 G/DL (ref 2–4)
GLUCOSE SERPL-MCNC: 94 MG/DL (ref 65–100)
HCT VFR BLD AUTO: 36.3 % (ref 36.6–50.3)
HGB BLD-MCNC: 11.9 G/DL (ref 12.1–17)
MAGNESIUM SERPL-MCNC: 1.9 MG/DL (ref 1.6–2.4)
MCH RBC QN AUTO: 27.2 PG (ref 26–34)
MCHC RBC AUTO-ENTMCNC: 32.8 G/DL (ref 30–36.5)
MCV RBC AUTO: 82.9 FL (ref 80–99)
NRBC # BLD: 0 K/UL (ref 0–0.01)
NRBC BLD-RTO: 0 PER 100 WBC
PLATELET # BLD AUTO: 205 K/UL (ref 150–400)
PMV BLD AUTO: 9 FL (ref 8.9–12.9)
POTASSIUM SERPL-SCNC: 3.4 MMOL/L (ref 3.5–5.1)
PROT SERPL-MCNC: 7.4 G/DL (ref 6.4–8.2)
RBC # BLD AUTO: 4.38 M/UL (ref 4.1–5.7)
SODIUM SERPL-SCNC: 140 MMOL/L (ref 136–145)
WBC # BLD AUTO: 8.9 K/UL (ref 4.1–11.1)

## 2021-07-09 PROCEDURE — 74011250637 HC RX REV CODE- 250/637: Performed by: ORTHOPAEDIC SURGERY

## 2021-07-09 PROCEDURE — 94640 AIRWAY INHALATION TREATMENT: CPT

## 2021-07-09 PROCEDURE — 94760 N-INVAS EAR/PLS OXIMETRY 1: CPT

## 2021-07-09 PROCEDURE — 74011000250 HC RX REV CODE- 250: Performed by: NURSE PRACTITIONER

## 2021-07-09 PROCEDURE — 83735 ASSAY OF MAGNESIUM: CPT

## 2021-07-09 PROCEDURE — 80053 COMPREHEN METABOLIC PANEL: CPT

## 2021-07-09 PROCEDURE — 36415 COLL VENOUS BLD VENIPUNCTURE: CPT

## 2021-07-09 PROCEDURE — 65270000029 HC RM PRIVATE

## 2021-07-09 PROCEDURE — 74011250636 HC RX REV CODE- 250/636: Performed by: INTERNAL MEDICINE

## 2021-07-09 PROCEDURE — 85027 COMPLETE CBC AUTOMATED: CPT

## 2021-07-09 PROCEDURE — 74011000258 HC RX REV CODE- 258: Performed by: INTERNAL MEDICINE

## 2021-07-09 PROCEDURE — 77010033678 HC OXYGEN DAILY

## 2021-07-09 PROCEDURE — 74011250637 HC RX REV CODE- 250/637: Performed by: INTERNAL MEDICINE

## 2021-07-09 PROCEDURE — 2709999900 HC NON-CHARGEABLE SUPPLY

## 2021-07-09 PROCEDURE — 97535 SELF CARE MNGMENT TRAINING: CPT | Performed by: OCCUPATIONAL THERAPIST

## 2021-07-09 PROCEDURE — 74011250637 HC RX REV CODE- 250/637: Performed by: NURSE PRACTITIONER

## 2021-07-09 RX ORDER — METOPROLOL TARTRATE 5 MG/5ML
2.5 INJECTION INTRAVENOUS ONCE
Status: COMPLETED | OUTPATIENT
Start: 2021-07-09 | End: 2021-07-09

## 2021-07-09 RX ORDER — METOPROLOL SUCCINATE 50 MG/1
50 TABLET, EXTENDED RELEASE ORAL DAILY
Status: DISCONTINUED | OUTPATIENT
Start: 2021-07-10 | End: 2021-07-14 | Stop reason: HOSPADM

## 2021-07-09 RX ORDER — METOPROLOL TARTRATE 25 MG/1
25 TABLET, FILM COATED ORAL ONCE
Status: COMPLETED | OUTPATIENT
Start: 2021-07-09 | End: 2021-07-09

## 2021-07-09 RX ORDER — POTASSIUM CHLORIDE 7.45 MG/ML
10 INJECTION INTRAVENOUS
Status: COMPLETED | OUTPATIENT
Start: 2021-07-09 | End: 2021-07-09

## 2021-07-09 RX ADMIN — RISPERIDONE 0.5 MG: 1 TABLET, FILM COATED ORAL at 17:23

## 2021-07-09 RX ADMIN — METOPROLOL TARTRATE 2.5 MG: 1 INJECTION, SOLUTION INTRAVENOUS at 00:32

## 2021-07-09 RX ADMIN — POTASSIUM CHLORIDE 10 MEQ: 10 INJECTION, SOLUTION INTRAVENOUS at 12:00

## 2021-07-09 RX ADMIN — DIAZEPAM 5 MG: 5 TABLET ORAL at 08:11

## 2021-07-09 RX ADMIN — PYRIDOXINE HCL TAB 50 MG 50 MG: 50 TAB at 08:02

## 2021-07-09 RX ADMIN — GUAIFENESIN 1200 MG: 600 TABLET, EXTENDED RELEASE ORAL at 08:02

## 2021-07-09 RX ADMIN — OXYCODONE 10 MG: 5 TABLET ORAL at 22:12

## 2021-07-09 RX ADMIN — RISPERIDONE 0.5 MG: 1 TABLET, FILM COATED ORAL at 21:08

## 2021-07-09 RX ADMIN — LISINOPRIL 20 MG: 20 TABLET ORAL at 08:02

## 2021-07-09 RX ADMIN — ACETAMINOPHEN 1000 MG: 500 TABLET ORAL at 05:16

## 2021-07-09 RX ADMIN — PANTOPRAZOLE SODIUM 40 MG: 40 TABLET, DELAYED RELEASE ORAL at 17:26

## 2021-07-09 RX ADMIN — CYANOCOBALAMIN TAB 500 MCG 1000 MCG: 500 TAB at 08:02

## 2021-07-09 RX ADMIN — LEVETIRACETAM 1000 MG: 100 INJECTION, SOLUTION INTRAVENOUS at 21:20

## 2021-07-09 RX ADMIN — METOPROLOL TARTRATE 25 MG: 25 TABLET, FILM COATED ORAL at 09:04

## 2021-07-09 RX ADMIN — MONTELUKAST 10 MG: 10 TABLET, FILM COATED ORAL at 21:09

## 2021-07-09 RX ADMIN — DIAZEPAM 5 MG: 5 TABLET ORAL at 01:53

## 2021-07-09 RX ADMIN — POTASSIUM CHLORIDE 10 MEQ: 10 INJECTION, SOLUTION INTRAVENOUS at 13:21

## 2021-07-09 RX ADMIN — Medication 10 ML: at 05:17

## 2021-07-09 RX ADMIN — LEVETIRACETAM 1000 MG: 100 INJECTION, SOLUTION INTRAVENOUS at 08:59

## 2021-07-09 RX ADMIN — RISPERIDONE 0.5 MG: 1 TABLET, FILM COATED ORAL at 08:02

## 2021-07-09 RX ADMIN — FERROUS SULFATE TAB 325 MG (65 MG ELEMENTAL FE) 325 MG: 325 (65 FE) TAB at 07:53

## 2021-07-09 RX ADMIN — GABAPENTIN 800 MG: 100 CAPSULE ORAL at 17:25

## 2021-07-09 RX ADMIN — PANTOPRAZOLE SODIUM 40 MG: 40 TABLET, DELAYED RELEASE ORAL at 08:02

## 2021-07-09 RX ADMIN — ATORVASTATIN CALCIUM 40 MG: 40 TABLET, FILM COATED ORAL at 21:09

## 2021-07-09 RX ADMIN — POTASSIUM CHLORIDE 10 MEQ: 10 INJECTION, SOLUTION INTRAVENOUS at 11:06

## 2021-07-09 RX ADMIN — BUSPIRONE HYDROCHLORIDE 15 MG: 5 TABLET ORAL at 21:07

## 2021-07-09 RX ADMIN — GUAIFENESIN 1200 MG: 600 TABLET, EXTENDED RELEASE ORAL at 17:26

## 2021-07-09 RX ADMIN — POTASSIUM CHLORIDE 10 MEQ: 10 INJECTION, SOLUTION INTRAVENOUS at 09:26

## 2021-07-09 RX ADMIN — GABAPENTIN 800 MG: 100 CAPSULE ORAL at 08:02

## 2021-07-09 RX ADMIN — METOPROLOL SUCCINATE 25 MG: 25 TABLET, EXTENDED RELEASE ORAL at 08:02

## 2021-07-09 RX ADMIN — DIAZEPAM 5 MG: 5 TABLET ORAL at 21:08

## 2021-07-09 RX ADMIN — DIAZEPAM 5 MG: 5 TABLET ORAL at 15:26

## 2021-07-09 RX ADMIN — PYRIDOXINE HCL TAB 50 MG 50 MG: 50 TAB at 18:00

## 2021-07-09 RX ADMIN — ARFORMOTEROL TARTRATE: 15 SOLUTION RESPIRATORY (INHALATION) at 21:08

## 2021-07-09 RX ADMIN — IPRATROPIUM BROMIDE 1 SPRAY: 42 SPRAY NASAL at 21:14

## 2021-07-09 RX ADMIN — THIAMINE HCL TAB 100 MG 100 MG: 100 TAB at 08:02

## 2021-07-09 RX ADMIN — TRAZODONE HYDROCHLORIDE 100 MG: 100 TABLET ORAL at 22:13

## 2021-07-09 RX ADMIN — DULOXETINE 120 MG: 30 CAPSULE, DELAYED RELEASE ORAL at 08:03

## 2021-07-09 RX ADMIN — FOLIC ACID 1 MG: 1 TABLET ORAL at 08:03

## 2021-07-09 RX ADMIN — OXYCODONE 10 MG: 5 TABLET ORAL at 08:11

## 2021-07-09 RX ADMIN — OXYCODONE 10 MG: 5 TABLET ORAL at 19:25

## 2021-07-09 RX ADMIN — GABAPENTIN 800 MG: 100 CAPSULE ORAL at 21:08

## 2021-07-09 RX ADMIN — FLUTICASONE PROPIONATE 2 SPRAY: 50 SPRAY, METERED NASAL at 08:07

## 2021-07-09 RX ADMIN — Medication 10 ML: at 21:14

## 2021-07-09 RX ADMIN — OXYCODONE 10 MG: 5 TABLET ORAL at 15:25

## 2021-07-09 RX ADMIN — BUSPIRONE HYDROCHLORIDE 15 MG: 5 TABLET ORAL at 08:02

## 2021-07-09 RX ADMIN — GABAPENTIN 800 MG: 100 CAPSULE ORAL at 12:01

## 2021-07-09 RX ADMIN — OXYCODONE 10 MG: 5 TABLET ORAL at 12:01

## 2021-07-09 RX ADMIN — ACETAMINOPHEN 1000 MG: 500 TABLET ORAL at 12:01

## 2021-07-09 RX ADMIN — OXYCODONE 10 MG: 5 TABLET ORAL at 05:17

## 2021-07-09 RX ADMIN — OXYCODONE 10 MG: 5 TABLET ORAL at 01:49

## 2021-07-09 RX ADMIN — IPRATROPIUM BROMIDE 1 SPRAY: 42 SPRAY NASAL at 08:07

## 2021-07-09 RX ADMIN — Medication 10 ML: at 13:23

## 2021-07-09 RX ADMIN — ARFORMOTEROL TARTRATE: 15 SOLUTION RESPIRATORY (INHALATION) at 07:34

## 2021-07-09 RX ADMIN — BUSPIRONE HYDROCHLORIDE 15 MG: 5 TABLET ORAL at 16:00

## 2021-07-09 NOTE — PROGRESS NOTES
Received notification from bedside RN about patient with regards to: persistenly elevated HR  VS: /97, , RR 18, O2 sat 98% on RA    Intervention given: Metoprolol 2.5 mg IV x 1 dose ordered

## 2021-07-09 NOTE — PROGRESS NOTES
Bedside shift change report given to 17[address] Orlando Gray (oncoming nurse) by Heidi Peña RN (offgoing nurse).  Report included the following information SBAR, Kardex, OR Summary, Intake/Output, MAR, Accordion, Recent Results and Cardiac Rhythm ST  .   CIWA status and history reviewed

## 2021-07-09 NOTE — ROUTINE PROCESS
Bedside and Verbal shift change report given to Tamara Medina RN (oncoming nurse) by Lizeth Gonsales RN (offgoing nurse).  Report included the following information SBAR, MAR and Cardiac Rhythm ST.

## 2021-07-09 NOTE — PROGRESS NOTES
Hospitalist Progress Note    NAME: Gray Ko   :  1960   MRN:  [id number]       Assessment / Plan:  Cervical stenosis s/p anterior cervical disc fusion  -Postoperative care per neurosurgery. Cervical collar in place.  -Pain control     Hypertension uncontrolled  -Increase metoprolol to 50 mg. Pain control should help with blood pressure control    Chronic alcoholism with concern for alcohol withdrawal  -Continue alcohol withdrawal protocol with Ativan. CIWA score is 2. Continue multivitamin     Chronic respiratory failure with hypoxia on home oxygen  Emphysema  History of tobacco abuse  Abnormal CT of the chest  TANIA not on CPAP  CT of the chest showed   Suspected sarcoidosis  1. Bilateral parenchymal scarring and traction bronchiectasis without evidence  of honeycombing. Given the upper lobe predominance of findings and multiple  calcified lymph nodes, this may be due to sarcoidosis. 2. 9.3 mm left upper lobe pulmonary nodule with internal central calcification,  likely a granuloma in this patient with multiple calcified mediastinal lymph  nodes. This could be followed up with CT scan. 3. Emphysema. Continue with home inhalers with pharmacy substitution, continue with nebs  Continue with montelukast  -Will arrange out pt f/u with pulmonary   -pt informed that he has a follow up with pulmonologist at [facility] and follow ups with them. His pulmonary nodules at least 13years old and are stable according to him.      Hypokalemia  -Replaced and recheck in am.       History of seizure disorder  gerd  Patient reported that he had seizures as a side effect of Chantix  He has been on Keppra for the last 7 years, he supposed to take Keppra 1000 twice daily but he only takes it daily  Cont keppra but change to IV  Continue Protonix    Depression  Anxiety  Continue with risperidone, trazodone  Continue BuSpar, Cymbalta  Will reach out to his primary psychiatrist about my concerns for polypharmacy    We will take over care    ROLAN: 7/10    Code Status: Full  DVT Prophylaxis: Per surgery         Body mass index is 29.99 kg/m². Code status: Full  Prophylaxis: Per primary  Recommended Disposition: Home w/Family     Subjective:     Chief Complaint / Reason for Physician Visit  Much more alert and awake today. Reports that neck pain is controlled  CIWA score is 2 this morning  Potassium low and replaced  Was tachycardic last night and received IV metoprolol        Review of Systems:  Symptom Y/N Comments  Symptom Y/N Comments   Fever/Chills    Chest Pain     Poor Appetite    Edema     Cough    Abdominal Pain     Sputum    Joint Pain     SOB/YOUNG    Pruritis/Rash     Nausea/vomit    Tolerating PT/OT     Diarrhea    Tolerating Diet     Constipation    Other       Could NOT obtain due to:      Objective:     VITALS:   Last 24hrs VS reviewed since prior progress note. Most recent are:  Patient Vitals for the past 24 hrs:   Temp Pulse Resp BP SpO2   07/09/21 1517 98.3 °F (36.8 °C) 93 16 133/84 96 %   07/09/21 1144 98.3 °F (36.8 °C) (!) 102 16 125/76 95 %   07/09/21 0906     93 %   07/09/21 0750 98.7 °F (37.1 °C) (!) 112 16 (!) 147/98 97 %   07/09/21 0734     96 %   07/09/21 0141  90      07/09/21 0032  (!) 114  116/74    07/09/21 0015  97      07/08/21 2301 98.9 °F (37.2 °C) (!) 114 18 (!) 140/87 98 %   07/08/21 2154     96 %   07/08/21 2011 100 °F (37.8 °C) (!) 104 18 (!) 144/91 94 %       Intake/Output Summary (Last 24 hours) at 7/9/2021 1520  Last data filed at 7/8/2021 2208  Gross per 24 hour   Intake    Output 1 ml   Net -1 ml        PHYSICAL EXAM:  General: no acute distress    EENT:  EOMI. Anicteric sclerae. MMM, cervical collar present  Resp:  CTA bilaterally, no wheezing or rales. No accessory muscle use  CV:  Regular  rhythm,  No edema  GI:  Soft, Non distended, Non tender.  +Bowel sounds  Neurologic:  Alert, awake and oriented x3  Psych:   Pleasant  Skin:  No rashes.   No jaundice    Reviewed most current lab test results and cultures  YES  Reviewed most current radiology test results   YES  Review and summation of old records today    NO  Reviewed patient's current orders and MAR    YES  PMH/SH reviewed - no change compared to H&P          Current Facility-Administered Medications:     [START ON 7/10/2021] metoprolol succinate (TOPROL-XL) XL tablet 50 mg, 50 mg, Oral, DAILY, Jason Dee MD    levETIRAcetam (KEPPRA) 1,000 mg in 0.9% sodium chloride 100 mL IVPB, 1,000 mg, IntraVENous, Q12H, Srinivas Dee MD, Last Rate: 400 mL/hr at 07/09/21 0859, 1,000 mg at 07/09/21 0859    busPIRone (BUSPAR) tablet 15 mg, 15 mg, Oral, TID, Srinivas Up MD, 15 mg at 07/09/21 0802    risperiDONE (RisperDAL) tablet 0.5 mg, 0.5 mg, Oral, TID, Srinivas Dee MD, 0.5 mg at 07/09/21 0802    traZODone (DESYREL) tablet 100 mg, 100 mg, Oral, QHS, Srinivas Dee MD, 100 mg at 07/08/21 2132    phenol throat spray (CHLORASEPTIC) 1 Spray, 1 Spray, Oral, PRN, Heike Donohue NP, 1 Spray at 07/07/21 1017    fluticasone propionate (FLONASE) 50 mcg/actuation nasal spray 2 Spray, 2 Spray, Both Nostrils, DAILY, Heike Donohue NP, 2 Spray at 07/09/21 0807    ipratropium (ATROVENT) 42 mcg (0.06 %) nasal spray 1 Spray, 1 Spray, Both Nostrils, TID, Heike Donohue NP, 1 Spray at 07/09/21 0807    LORazepam (ATIVAN) injection 2 mg, 2 mg, IntraVENous, Q1H PRN, Mikki Salazar MD, 2 mg at 07/08/21 0602    LORazepam (ATIVAN) injection 4 mg, 4 mg, IntraVENous, Q1H PRN, Jason Up MD    folic acid (FOLVITE) tablet 1 mg, 1 mg, Oral, DAILY, Srinivas Dee MD, 1 mg at 07/09/21 0803    thiamine mononitrate (B-1) tablet 100 mg, 100 mg, Oral, DAILY, Jez ARTHUR MD, 100 mg at 07/09/21 0802    arformoterol 15 mcg/budesonide 0.5 mg neb solution, , Nebulization, BID, Heike Donohue NP, Given at 07/09/21 0734    DULoxetine (CYMBALTA) capsule 120 mg, 120 mg, Oral, DAILY, Heike Donohue NP, 120 mg at 07/09/21 0803    montelukast (SINGULAIR) tablet 10 mg, 10 mg, Oral, QHS, Arn Wilber, Collette KANG NP, 10 mg at 07/08/21 2132    atorvastatin (LIPITOR) tablet 40 mg, 40 mg, Oral, QHS, Nicole Coughlin NP, 40 mg at 07/08/21 2132    labetaloL (NORMODYNE;TRANDATE) injection 10 mg, 10 mg, IntraVENous, Q4H PRN, Prieto Brian MD, 10 mg at 07/08/21 1149    lisinopriL (PRINIVIL, ZESTRIL) tablet 20 mg, 20 mg, Oral, DAILY, Jc CHI MD, 20 mg at 07/09/21 0802    gabapentin (NEURONTIN) capsule 800 mg, 800 mg, Oral, QID, Tommy ARTHUR MD, 800 mg at 07/09/21 1201    cyanocobalamin (VITAMIN B12) tablet 1,000 mcg, 1,000 mcg, Oral, DAILY, Tommy ARTHUR MD, 1,000 mcg at 07/09/21 0802    carbamide peroxide (DEBROX) 6.5 % otic solution 5 Drop, 5 Drop, Both Ears, BID, Tommy ARTHUR MD, 5 Drop at 07/08/21 1746    [START ON 7/12/2021] ergocalciferol capsule 50,000 Units, 50,000 Units, Oral, Q7D, Toby Amezcua MD    ferrous sulfate tablet 325 mg, 1 Tablet, Oral, ACB, Tommy ARTHUR MD, 325 mg at 07/09/21 0753    guaiFENesin ER (MUCINEX) tablet 1,200 mg, 1,200 mg, Oral, BID, Tommy ARTHUR MD, 1,200 mg at 07/09/21 0802    pantoprazole (PROTONIX) tablet 40 mg, 40 mg, Oral, ACB&D, Toby Amezcua MD, 40 mg at 07/09/21 0802    pyridoxine (vitamin B6) (VITAMIN B-6) tablet 50 mg, 50 mg, Oral, BID, Tommy ARTHUR MD, 50 mg at 07/09/21 0802    ipratropium (ATROVENT) 0.02 % nebulizer solution 0.5 mg, 0.5 mg, Nebulization, Q6H PRN, Toby Amezcua MD    sodium chloride (NS) flush 5-40 mL, 5-40 mL, IntraVENous, Q8H, Ken Olmstead MD, 10 mL at 07/09/21 1323    sodium chloride (NS) flush 5-40 mL, 5-40 mL, IntraVENous, PRN, Toby Amezcua MD    acetaminophen (TYLENOL) tablet 1,000 mg, 1,000 mg, Oral, Q6H, Isaura Ken العلي MD, 1,000 mg at 07/09/21 1201    oxyCODONE IR (ROXICODONE) tablet 5 mg, 5 mg, Oral, Q3H PRN, Tommy ARTHUR MD, 5 mg at 07/08/21 1825    oxyCODONE IR (ROXICODONE) tablet 10 mg, 10 mg, Oral, Q3H PRN, Ligia Hernandez MD, 10 mg at 07/09/21 1201    hydrOXYzine HCL (ATARAX) tablet 10 mg, 10 mg, Oral, Q8H PRN, Ligia Hernandez MD    senna-docusate (PERICOLACE) 8.6-50 mg per tablet 1 Tablet, 1 Tablet, Oral, BID, Ligia Hernandez MD    polyethylene glycol (MIRALAX) packet 17 g, 17 g, Oral, DAILY, Eric Olmstead MD    bisacodyL (DULCOLAX) suppository 10 mg, 10 mg, Rectal, DAILY PRN, Ligia Hernandez MD    benzocaine-menthoL (CHLORASEPTIC MAX) lozenge 1 Lozenge, 1 Lozenge, Oral, PRN, Ligia Hernandez MD, 1 Lozenge at 07/06/21 2203    diazePAM (VALIUM) tablet 5 mg, 5 mg, Oral, Q6H PRN, Alondra ARTHUR MD, 5 mg at 07/09/21 4203    cyclobenzaprine (FLEXERIL) tablet 10 mg, 10 mg, Oral, BID PRN, Ligia Hernandez MD    albuterol-ipratropium (DUO-NEB) 2.5 MG-0.5 MG/3 ML, 3 mL, Nebulization, Q4H PRN, Ligia Hernandez MD    alcohol 62% (NOZIN) nasal  1 Ampule, 1 Ampule, Topical, Q12H, Ken Olmstead MD, 1 Ampule at 07/08/21 2019    sodium chloride (NS) flush 5-40 mL, 5-40 mL, IntraVENous, Q8H, Cesar Koroma PA-C, 10 mL at 07/08/21 2143    sodium chloride (NS) flush 5-40 mL, 5-40 mL, IntraVENous, PRN, Cesar Koroma PA-C    naloxone [facility]) injection 0.4 mg, 0.4 mg, IntraVENous, PRN, Arthur Perez PA-C  ________________________________________________________________________  Care Plan discussed with:    Comments   Patient y    Family      RN y    Care Manager     Consultant                        Multidiciplinary team rounds were held today with , nursing, pharmacist and clinical coordinator. Patient's plan of care was discussed; medications were reviewed and discharge planning was addressed.      ________________________________________________________________________  Total NON critical care TIME:  35   Minutes    Total CRITICAL CARE TIME Spent:   Minutes non procedure based      Comments   >50% of visit spent in counseling and coordination of care ________________________________________________________________________  Laney Hayden MD     Procedures: see electronic medical records for all procedures/Xrays and details which were not copied into this note but were reviewed prior to creation of Plan. LABS:  I reviewed today's most current labs and imaging studies.   Pertinent labs include:  Recent Labs     07/09/21 0200 07/08/21  0013   WBC 8.9 10.8   HGB 11.9* 11.7*   HCT 36.3* 36.1*    220     Recent Labs     07/09/21 0200 07/08/21  0013    138   K 3.4* 3.3*    106   CO2 27 26   GLU 94 115*   BUN 7 6   CREA 0.80 0.72   CA 9.1 8.9   MG 1.9  --    ALB 3.0* 3.2*   TBILI 0.7 0.9   ALT 19 18       Signed: Laney Hayden MD

## 2021-07-09 NOTE — PROGRESS NOTES
Problem: Self Care Deficits Care Plan (Adult)  Goal: *Acute Goals and Plan of Care (Insert Text)  Description: FUNCTIONAL STATUS PRIOR TO ADMISSION: on 3L NC with activity and not at rest, has a personal care aide to assist with IADLs, performed ADLS on his own    [address]: The patient lived alone but has a paid personal care aide 6 days a week. Occupational Therapy Goals: All below goals met 7/9/2021    Initiated 7/7/2021  1. Patient will perform upper body dressing and lower body dressing with modified independence within 7 days. 2. Patient will perform toileting with modified independence within 7 days. Outcome: Resolved/Met   OCCUPATIONAL THERAPY TREATMENT/DISCHARGE  Patient: Louise Stillr (41 y.o. male)  Date: 7/9/2021  Diagnosis: Cervical spinal stenosis [M48.02] <principal problem not specified>  Procedure(s) (LRB):  CERVICAL 3 TO CERVICAL 7 ANTERIOR CERVICAL DISCECTOMY AND FUSION (N/A) 3 Days Post-Op  Precautions: Spinal (BLT, Aspen Collar)  Chart, occupational therapy assessment, plan of care, and goals were reviewed. ASSESSMENT  Patient continues with skilled OT services and has progressed towards goals. Pt was able to perform UB and LB dressing to include aspen collar without assist.  He has good balance and is performing ADL transfers without assist using SPC. He was able to perform mock toileting without assist as well. He has no concerns in regards to discharge to home and reports that his paid caregiver can assist if needed. He agrees that further OT services are not needed at this time.      Current Level of Function Impacting Discharge (ADLs):   Bed Mobility:  Rolling: Independent (log roll HOB)  Supine to Sit: Independent  Sit to Supine: Independent  Scooting: Independent    Transfers:  Sit to Stand: Independent  Functional Transfers  Bathroom Mobility: Independent  Toilet Transfer : Independent  Bed to Chair: Modified independent (SPC and managing O2)    Upper Body Dressing Assistance  Orthotics(Brace): Independent (standing in front mirror)    Lower Body Dressing Assistance  Socks: Modified independent (crossed leg)    Other factors to consider for discharge: none      PLAN :  Patient continues to benefit from skilled intervention to address the above impairments. Continue treatment per established plan of care to address goals. Recommendation for discharge: (in order for the patient to meet his/her long term goals)  No skilled occupational therapy/ follow up rehabilitation needs identified at this time. This discharge recommendation:  Has been made in collaboration with the attending provider and/or case management    IF patient discharges home will need the following DME: none       SUBJECTIVE:   Patient stated I think I've got it now. I just wanted to know how to get the collar on and off.     OBJECTIVE DATA SUMMARY:   Cognitive/Behavioral Status:  Neurologic State: Alert  Orientation Level: Oriented X4  Cognition: Appropriate decision making; Appropriate for age attention/concentration; Appropriate safety awareness  Perception: Appears intact  Perseveration: No perseveration noted  Safety/Judgement: Awareness of environment; Fall prevention;Home safety; Insight into deficits    Functional Mobility and Transfers for ADLs:  Bed Mobility:  Rolling: Independent (log roll HOB)  Supine to Sit: Independent  Sit to Supine: Independent  Scooting: Independent    Transfers:  Sit to Stand: Independent  Functional Transfers  Bathroom Mobility: Independent  Toilet Transfer : Independent  Bed to Chair: Modified independent (SPC and managing O2)    Balance:  Sitting: Intact  Standing: Intact  Standing - Static: Good  Standing - Dynamic : Good    ADL Intervention:                      Upper Body Dressing Assistance  Orthotics(Brace):  Independent (standing in front mirror)    Lower Body Dressing Assistance  Socks: Modified independent (crossed leg) Cognitive Retraining  Safety/Judgement: Awareness of environment; Fall prevention;Home safety; Insight into deficits    Patient recalled and demonstrated 3/3 cervical spine precautions with no cues. Patient instructed and indicated understanding the benefits of maintaining activity tolerance, functional mobility, and independence with self care tasks during acute stay  to ensure safe return home and to baseline. Encouraged patient to increase frequency and duration OOB, not sitting longer than 30 mins without marching/walking with staff, be out of bed for all meals, perform daily ADLs (as approved by RN/MD regarding bathing etc), and performing functional mobility to/from bathroom. Patient instruction and indicated understanding on body mechanics, ergonomics and gravitational force on the spine during different body positions to plan activities in prep for return home to complete basic ADLs, instrumental ADLs and back to work safely. Bathing: Patient instructed and indicated understanding when bathing to not submerge wound in water and to follow doctor's instructions on when to start bathing and if he has to wear his collar in the shower. Pt voiced understanding. Dressing brace: Patient instructed and demonstrated while in front of mirror to don/doff velcro on brace using dominant side, keeping non-dominant side intact. Instruction and indicated understanding in removal of fabric pieces, placement of clean pieces, don brace, then can hand wash and allow air dry. Performed with  independence  Dressing lower body: Patient instructed and demonstrated tailor sitting for lower body dressing with independence crossed leg. Toileting: Patient instructed on the benefits of using flushable wet wipes and toilet tongs if decreased reach or pain for christina care. Also, the benefits of a reacher to aid in clothing management.    Home safety: Patient instructed and indicated understanding on home modifications and safety [raise height of ADL objects (i.e. clothing, sink items, fridge items, items to mouth when grooming), change of floor surfaces, clear pathways] to increase independence and fall prevention. Standing: Patient instructed and indicated understanding to walk up to sink/counter top/surfaces, step into walker, square off while using objects, slide objects along surfaces, to increase adherence     Activity Tolerance:   Good      After treatment patient left in no apparent distress:   Sitting in chair and Call bell within reach    COMMUNICATION/COLLABORATION:   The patients plan of care was discussed with: Registered nurse and patient.      Ambrose De La Rosa OTR/L  Time Calculation: 18 mins

## 2021-07-09 NOTE — PROGRESS NOTES
Ortho / Neurosurgery NP Note    POD# 3  s/p CERVICAL 3 TO CERVICAL 7 ANTERIOR CERVICAL DISCECTOMY AND FUSION   Pt seen with no visitor present. During my assessment Dr. Jose M Glaser came in to visit as well. Pt seen resting in bed. Alert, oriented, very calm and conversant. CIWA started 7/7 for symptoms  of ETOH withdrawal   No tremors noted today. CIWA score is 2  Pt eating, swallowing with no difficulty. VSS Afebrile. Room air. Wears 3L O2 with exertion at baseline   Mild Tachycardia - -111; received IV Metoprolol last night. Visit Vitals  BP (!) 147/98 (BP 1 Location: Right arm, BP Patient Position: At rest)   Pulse (!) 112   Temp 98.7 °F (37.1 °C)   Resp 16   Ht 5' 11\" (1.803 m)   Wt 97.5 kg (215 lb)   SpO2 93%   BMI 29.99 kg/m²       Voiding status: voiding   Output (mL)  Urine Voided: 350 ml (07/07/21 0842)  Stool: 1 ml (07/08/21 2208)  Last Bowel Movement Date: 07/09/21 (07/09/21 0929)  Unmeasurable Output  Urine Occurrence(s): 1 (07/09/21 0212)  Stool Occurrence(s): 1 (07/09/21 0929)      Labs    Lab Results   Component Value Date/Time    HGB 11.9 (L) 07/09/2021 02:00 AM      Lab Results   Component Value Date/Time    INR 1.0 07/06/2021 04:04 AM      Lab Results   Component Value Date/Time    Sodium 140 07/09/2021 02:00 AM    Potassium 3.4 (L) 07/09/2021 02:00 AM    Chloride 108 07/09/2021 02:00 AM    CO2 27 07/09/2021 02:00 AM    Glucose 94 07/09/2021 02:00 AM    BUN 7 07/09/2021 02:00 AM    Creatinine 0.80 07/09/2021 02:00 AM    Calcium 9.1 07/09/2021 02:00 AM     Recent Glucose Results:   Lab Results   Component Value Date/Time    GLU 94 07/09/2021 02:00 AM       Body mass index is 29.99 kg/m². : A BMI > 30 is classified as obesity and > 40 is classified as morbid obesity. Neal dressing c.d.i  MADI drain removed 7/7, gauze c/d/i  Calves soft and supple;  No pain with passive stretch  Sensation and motor intact - FUNES, assessment limited 2/2 sedation   SCDs for mechanical DVT proph while in bed     PLAN:  1) Neurovascular assessment q4 hours   2) PT/OT - Hard cervical collar. 3) Pain control - scheduled tylenol, prn oxycodone. Limit narcotic use until more alert. 4) HTN with tachycardia - PTA med list not updated on admission, pharmacy completed med rec 7/7, metoprolol resumed. IM following, appreciate assistance. ETOH withdrawal likely contributing. 5) ETOH abuse - [facility] protocol. 6) Discharge planning - lives alone with CNA who helps with IADLs, relys on local [employer] friends for additional support, uses medicaid transportation. He cleared therapy on POD1 and would benefit from [facility] to continue PT/OT. Discharge pending medical stability. Dr. Elva Vee planning to reach out to psychiatrist to discuss medication regimen and coordinate d/c planning. He offered to take over as attending and will complete discharge over the weekend. All ortho d/c instructions completed. Pain rx not done due to potential needs with home medication adjustments. Dr. Elva Vee will complete med rec and any need rx on discharge. He is hopeful pt will dc tomorrow.        Valdo Bazan, SHARI

## 2021-07-09 NOTE — PROGRESS NOTES
Patient had 5 episodes of loose stool/diarrhea and several incontinent episodes today. Bedside shift change report given to Ozzie Theodore RN (oncoming nurse) by America Love RN (offgoing nurse). Report included the following information SBAR, Kardex, Procedure Summary, Intake/Output, MAR, Accordion, Recent Results, Med Rec Status and Cardiac Rhythm sinus tach.

## 2021-07-09 NOTE — PROGRESS NOTES
Pharmacy Medication Reconciliation     The patient was interviewed regarding current PTA medication list, use and drug allergies The patient was questioned regarding use of any other inhalers, topical products, over the counter medications, herbal medications, vitamin products or ophthalmic/nasal/otic medication use. Allergy Update: Chantix [varenicline]    Recommendations/Findings: The following amendments were made to the patient's active medication list on file at [address]:   1) Additions: Tizanidine, lisinopril    2) Deletions: naltrexone, ear drops, cyclobenzaprine, diazepam, vistaril    3) Changes: LevETIRAcetam (KEPPRA) 1,000 mg prescription is for BID but patient only takes it daily    Pertinent Findings: as noted    -Clarified PTA med list with Mr Divya Vogel and [id number]. PTA medication list was corrected to the following:     Prior to Admission Medications   Prescriptions Last Dose Informant Taking? DULoxetine (CYMBALTA) 60 mg capsule 2021  Yes   Sig: Take 120 mg by mouth daily. Mucinex 600 mg ER tablet 2021  Yes   Si,200 mg two (2) times a day. Oxygen 2021  Yes   Sig: Indications: 3 LITERS NC AT BEDTIME   acetaminophen (TYLENOL) 500 mg tablet 2021  Yes   Sig: TAKE 2 TABLETS BY MOUTH EVERY 6 HOURS AS NEEDED FOR MILD PAIN OR MODERATE PAIN FOR UP TO 10 DAYS   albuterol-ipratropium (DUO-NEB) 2.5 mg-0.5 mg/3 ml nebu 2021  Yes   Sig: 3 mL by Nebulization route every six (6) hours as needed. atorvastatin (LIPITOR) 40 mg tablet 2021  Yes   Sig: Take 40 mg by mouth nightly. baclofen (LIORESAL) 10 mg tablet 2021  Yes   Sig: Take 10 mg by mouth three (3) times daily. budesonide-formoterol (SYMBICORT) 80-4.5 mcg/actuation HFAA 2021  Yes   Sig: Take 2 Puffs by inhalation two (2) times a day. Indications: BRONCHOSPASM PREVENTION WITH COPD   busPIRone (BUSPAR) 15 mg tablet 2021  Yes   Sig: Take 1 Tab by mouth three (3) times daily.  Indications: Generalized Anxiety Disorder   Patient taking differently: Take 30 mg by mouth two (2) times a day. Indications: repeated episodes of anxiety   cyanocobalamin (Vitamin B-12) 1,000 mcg tablet 2021  Yes   Sig: Take 1,000 mcg by mouth daily. diclofenac potassium (CATAFLAM) 50 mg tablet 2021  Yes   Sig: Take 50 mg by mouth two (2) times daily as needed. ergocalciferol (ERGOCALCIFEROL) 1,250 mcg (50,000 unit) capsule 2021  Yes   Sig: TAKE 1 CAPSULE BY ORAL ROUTE WEEKLY FOR 90 DAYS   ferrous sulfate 325 mg (65 mg iron) tablet 2021  Yes   Sig: Take  by mouth Daily (before breakfast). fluticasone propionate (FLONASE) 50 mcg/actuation nasal spray 2021  Yes   Sig: INSTILL 2 SPRAYS IN THE NOSTRILS DAILY   gabapentin (NEURONTIN) 800 mg tablet 2021  Yes   Sig: Take 800 mg by mouth three (3) times daily. hydrOXYzine HCL (ATARAX) 25 mg tablet 2021  Yes   Sig: Take  by mouth three (3) times daily as needed. ipratropium (ATROVENT) 42 mcg (0.06 %) nasal spray 2021  Yes   Si Spray by Nasal route three (3) times daily. 1-2 squirts in the nostrils   levETIRAcetam (KEPPRA) 1,000 mg tablet 2021  Yes   Sig: Take 1 Tab by mouth two (2) times a day. Indications: PARTIAL EPILEPSY TREATMENT ADJUNCT   Patient taking differently: Take 1,000 mg by mouth daily. Prescription is for BID, patient takes only daily. Indications: additional medication to treat partial seizures   lisinopriL (PRINIVIL, ZESTRIL) 10 mg tablet   Yes   Sig: Take 20 mg by mouth daily. metoprolol succinate (TOPROL-XL) 25 mg XL tablet 2021  Yes   Sig: Take 25 mg by mouth daily. montelukast (SINGULAIR) 10 mg tablet 2021  Yes   Sig: Take 10 mg by mouth nightly. ondansetron hcl (ZOFRAN) 4 mg tablet 2021  Yes   Sig: Take 4 mg by mouth every six (6) hours as needed for Nausea. oxyCODONE IR (ROXICODONE) 5 mg immediate release tablet 2021  Yes   Sig: Take 7.5 mg by mouth three (3) times daily as needed for Pain. pantoprazole (PROTONIX) 40 mg tablet 7/5/2021  Yes   Sig: Take 1 Tab by mouth Before breakfast and dinner. Patient taking differently: Take 40 mg by mouth daily. pyridoxine, vitamin B6, (VITAMIN B-6) 50 mg tablet 7/5/2021  Yes   Sig: Take 50 mg by mouth two (2) times a day. risperiDONE (RisperDAL) 1 mg tablet 7/5/2021  Yes   Sig: Take 1 mg by mouth three (3) times daily. senna (Senexon) 8.6 mg tablet 7/5/2021  Yes   Sig: Take 2 Tabs by mouth two (2) times a day. tiZANidine (ZANAFLEX) 4 mg tablet   Yes   Sig: Take 4 mg by mouth every eight (8) hours as needed for Muscle Spasm(s). tiotropium (SPIRIVA) 18 mcg inhalation capsule 7/5/2021  Yes   Sig: Take 1 Cap by inhalation daily. Patient taking differently: Take 1 Capsule by inhalation two (2) times a day. traZODone (DESYREL) 100 mg tablet 7/5/2021  Yes   Sig: Take 100 mg by mouth nightly.  Take 2-3 tablets      Facility-Administered Medications: None        Thank you,  MALKA Abraham

## 2021-07-10 LAB
AMPHET UR QL SCN: NEGATIVE
ANION GAP SERPL CALC-SCNC: 5 MMOL/L (ref 5–15)
BARBITURATES UR QL SCN: NEGATIVE
BENZODIAZ UR QL: POSITIVE
BUN SERPL-MCNC: 12 MG/DL (ref 6–20)
BUN/CREAT SERPL: 16 (ref 12–20)
CALCIUM SERPL-MCNC: 9.2 MG/DL (ref 8.5–10.1)
CANNABINOIDS UR QL SCN: NEGATIVE
CHLORIDE SERPL-SCNC: 109 MMOL/L (ref 97–108)
CO2 SERPL-SCNC: 25 MMOL/L (ref 21–32)
COCAINE UR QL SCN: NEGATIVE
CREAT SERPL-MCNC: 0.77 MG/DL (ref 0.7–1.3)
DRUG SCRN COMMENT,DRGCM: ABNORMAL
ERYTHROCYTE [DISTWIDTH] IN BLOOD BY AUTOMATED COUNT: 13.8 % (ref 11.5–14.5)
GLUCOSE SERPL-MCNC: 101 MG/DL (ref 65–100)
HCT VFR BLD AUTO: 34.3 % (ref 36.6–50.3)
HGB BLD-MCNC: 11.2 G/DL (ref 12.1–17)
MCH RBC QN AUTO: 27.3 PG (ref 26–34)
MCHC RBC AUTO-ENTMCNC: 32.7 G/DL (ref 30–36.5)
MCV RBC AUTO: 83.5 FL (ref 80–99)
METHADONE UR QL: NEGATIVE
NRBC # BLD: 0 K/UL (ref 0–0.01)
NRBC BLD-RTO: 0 PER 100 WBC
OPIATES UR QL: POSITIVE
PCP UR QL: NEGATIVE
PLATELET # BLD AUTO: 199 K/UL (ref 150–400)
PMV BLD AUTO: 9.1 FL (ref 8.9–12.9)
POTASSIUM SERPL-SCNC: 3.7 MMOL/L (ref 3.5–5.1)
RBC # BLD AUTO: 4.11 M/UL (ref 4.1–5.7)
SODIUM SERPL-SCNC: 139 MMOL/L (ref 136–145)
WBC # BLD AUTO: 7.7 K/UL (ref 4.1–11.1)

## 2021-07-10 PROCEDURE — 74011250637 HC RX REV CODE- 250/637: Performed by: INTERNAL MEDICINE

## 2021-07-10 PROCEDURE — 74011250637 HC RX REV CODE- 250/637: Performed by: NURSE PRACTITIONER

## 2021-07-10 PROCEDURE — 74011250637 HC RX REV CODE- 250/637: Performed by: ORTHOPAEDIC SURGERY

## 2021-07-10 PROCEDURE — 65270000029 HC RM PRIVATE

## 2021-07-10 PROCEDURE — 80307 DRUG TEST PRSMV CHEM ANLYZR: CPT

## 2021-07-10 PROCEDURE — 74011000258 HC RX REV CODE- 258: Performed by: INTERNAL MEDICINE

## 2021-07-10 PROCEDURE — 94640 AIRWAY INHALATION TREATMENT: CPT

## 2021-07-10 PROCEDURE — 94760 N-INVAS EAR/PLS OXIMETRY 1: CPT

## 2021-07-10 PROCEDURE — 77010033678 HC OXYGEN DAILY

## 2021-07-10 PROCEDURE — 85027 COMPLETE CBC AUTOMATED: CPT

## 2021-07-10 PROCEDURE — 74011000250 HC RX REV CODE- 250: Performed by: NURSE PRACTITIONER

## 2021-07-10 PROCEDURE — 36415 COLL VENOUS BLD VENIPUNCTURE: CPT

## 2021-07-10 PROCEDURE — 74011250636 HC RX REV CODE- 250/636: Performed by: INTERNAL MEDICINE

## 2021-07-10 PROCEDURE — 80048 BASIC METABOLIC PNL TOTAL CA: CPT

## 2021-07-10 RX ORDER — MORPHINE SULFATE 2 MG/ML
2 INJECTION, SOLUTION INTRAMUSCULAR; INTRAVENOUS
Status: DISCONTINUED | OUTPATIENT
Start: 2021-07-10 | End: 2021-07-14 | Stop reason: HOSPADM

## 2021-07-10 RX ORDER — OXYCODONE HYDROCHLORIDE 5 MG/1
5 TABLET ORAL
Qty: 15 TABLET | Refills: 0 | Status: SHIPPED | OUTPATIENT
Start: 2021-07-10 | End: 2021-07-14

## 2021-07-10 RX ORDER — METOPROLOL SUCCINATE 50 MG/1
50 TABLET, EXTENDED RELEASE ORAL DAILY
Qty: 30 TABLET | Refills: 0 | Status: SHIPPED | OUTPATIENT
Start: 2021-07-11 | End: 2021-08-10

## 2021-07-10 RX ORDER — CHLORDIAZEPOXIDE HYDROCHLORIDE 25 MG/1
25 CAPSULE, GELATIN COATED ORAL 3 TIMES DAILY
Status: DISCONTINUED | OUTPATIENT
Start: 2021-07-10 | End: 2021-07-11

## 2021-07-10 RX ADMIN — RISPERIDONE 0.5 MG: 1 TABLET, FILM COATED ORAL at 16:23

## 2021-07-10 RX ADMIN — LEVETIRACETAM 1000 MG: 100 INJECTION, SOLUTION INTRAVENOUS at 21:17

## 2021-07-10 RX ADMIN — Medication 1 AMPULE: at 09:35

## 2021-07-10 RX ADMIN — THIAMINE HCL TAB 100 MG 100 MG: 100 TAB at 09:36

## 2021-07-10 RX ADMIN — DOCUSATE SODIUM 50MG AND SENNOSIDES 8.6MG 1 TABLET: 8.6; 5 TABLET, FILM COATED ORAL at 09:36

## 2021-07-10 RX ADMIN — Medication 10 ML: at 14:20

## 2021-07-10 RX ADMIN — DULOXETINE 120 MG: 30 CAPSULE, DELAYED RELEASE ORAL at 09:36

## 2021-07-10 RX ADMIN — DOCUSATE SODIUM 50MG AND SENNOSIDES 8.6MG 1 TABLET: 8.6; 5 TABLET, FILM COATED ORAL at 17:49

## 2021-07-10 RX ADMIN — PANTOPRAZOLE SODIUM 40 MG: 40 TABLET, DELAYED RELEASE ORAL at 16:23

## 2021-07-10 RX ADMIN — ARFORMOTEROL TARTRATE: 15 SOLUTION RESPIRATORY (INHALATION) at 08:06

## 2021-07-10 RX ADMIN — ATORVASTATIN CALCIUM 40 MG: 40 TABLET, FILM COATED ORAL at 21:21

## 2021-07-10 RX ADMIN — CARBAMIDE PEROXIDE 6.5% 5 DROP: 6.5 LIQUID AURICULAR (OTIC) at 09:42

## 2021-07-10 RX ADMIN — PYRIDOXINE HCL TAB 50 MG 50 MG: 50 TAB at 09:36

## 2021-07-10 RX ADMIN — Medication 10 ML: at 21:23

## 2021-07-10 RX ADMIN — GUAIFENESIN 1200 MG: 600 TABLET, EXTENDED RELEASE ORAL at 09:35

## 2021-07-10 RX ADMIN — IPRATROPIUM BROMIDE 1 SPRAY: 42 SPRAY NASAL at 21:23

## 2021-07-10 RX ADMIN — ARFORMOTEROL TARTRATE: 15 SOLUTION RESPIRATORY (INHALATION) at 19:57

## 2021-07-10 RX ADMIN — OXYCODONE 10 MG: 5 TABLET ORAL at 16:23

## 2021-07-10 RX ADMIN — FERROUS SULFATE TAB 325 MG (65 MG ELEMENTAL FE) 325 MG: 325 (65 FE) TAB at 09:35

## 2021-07-10 RX ADMIN — Medication 1 AMPULE: at 21:22

## 2021-07-10 RX ADMIN — PYRIDOXINE HCL TAB 50 MG 50 MG: 50 TAB at 17:49

## 2021-07-10 RX ADMIN — CHLORDIAZEPOXIDE HYDROCHLORIDE 25 MG: 25 CAPSULE ORAL at 21:20

## 2021-07-10 RX ADMIN — GABAPENTIN 800 MG: 100 CAPSULE ORAL at 09:36

## 2021-07-10 RX ADMIN — CARBAMIDE PEROXIDE 6.5% 5 DROP: 6.5 LIQUID AURICULAR (OTIC) at 17:49

## 2021-07-10 RX ADMIN — CHLORDIAZEPOXIDE HYDROCHLORIDE 25 MG: 25 CAPSULE ORAL at 15:03

## 2021-07-10 RX ADMIN — MONTELUKAST 10 MG: 10 TABLET, FILM COATED ORAL at 21:21

## 2021-07-10 RX ADMIN — LEVETIRACETAM 1000 MG: 100 INJECTION, SOLUTION INTRAVENOUS at 09:44

## 2021-07-10 RX ADMIN — BUSPIRONE HYDROCHLORIDE 15 MG: 5 TABLET ORAL at 09:36

## 2021-07-10 RX ADMIN — ACETAMINOPHEN 1000 MG: 500 TABLET ORAL at 17:49

## 2021-07-10 RX ADMIN — ACETAMINOPHEN 1000 MG: 500 TABLET ORAL at 00:21

## 2021-07-10 RX ADMIN — IPRATROPIUM BROMIDE 1 SPRAY: 42 SPRAY NASAL at 16:24

## 2021-07-10 RX ADMIN — TRAZODONE HYDROCHLORIDE 100 MG: 100 TABLET ORAL at 21:21

## 2021-07-10 RX ADMIN — ACETAMINOPHEN 1000 MG: 500 TABLET ORAL at 12:57

## 2021-07-10 RX ADMIN — FOLIC ACID 1 MG: 1 TABLET ORAL at 09:37

## 2021-07-10 RX ADMIN — Medication 10 ML: at 21:22

## 2021-07-10 RX ADMIN — BUSPIRONE HYDROCHLORIDE 15 MG: 5 TABLET ORAL at 21:20

## 2021-07-10 RX ADMIN — DIAZEPAM 5 MG: 5 TABLET ORAL at 06:01

## 2021-07-10 RX ADMIN — LISINOPRIL 20 MG: 20 TABLET ORAL at 09:36

## 2021-07-10 RX ADMIN — METOPROLOL SUCCINATE 50 MG: 50 TABLET, EXTENDED RELEASE ORAL at 09:36

## 2021-07-10 RX ADMIN — BUSPIRONE HYDROCHLORIDE 15 MG: 5 TABLET ORAL at 16:22

## 2021-07-10 RX ADMIN — GUAIFENESIN 1200 MG: 600 TABLET, EXTENDED RELEASE ORAL at 17:49

## 2021-07-10 RX ADMIN — ACETAMINOPHEN 1000 MG: 500 TABLET ORAL at 06:09

## 2021-07-10 RX ADMIN — CYANOCOBALAMIN TAB 500 MCG 1000 MCG: 500 TAB at 09:35

## 2021-07-10 RX ADMIN — GABAPENTIN 800 MG: 100 CAPSULE ORAL at 17:49

## 2021-07-10 RX ADMIN — OXYCODONE 10 MG: 5 TABLET ORAL at 05:57

## 2021-07-10 RX ADMIN — GABAPENTIN 800 MG: 100 CAPSULE ORAL at 21:21

## 2021-07-10 RX ADMIN — FLUTICASONE PROPIONATE 2 SPRAY: 50 SPRAY, METERED NASAL at 09:42

## 2021-07-10 RX ADMIN — PANTOPRAZOLE SODIUM 40 MG: 40 TABLET, DELAYED RELEASE ORAL at 09:35

## 2021-07-10 RX ADMIN — OXYCODONE 10 MG: 5 TABLET ORAL at 09:37

## 2021-07-10 RX ADMIN — RISPERIDONE 0.5 MG: 1 TABLET, FILM COATED ORAL at 09:35

## 2021-07-10 RX ADMIN — OXYCODONE 10 MG: 5 TABLET ORAL at 12:58

## 2021-07-10 RX ADMIN — OXYCODONE 10 MG: 5 TABLET ORAL at 22:33

## 2021-07-10 RX ADMIN — DIAZEPAM 5 MG: 5 TABLET ORAL at 12:57

## 2021-07-10 RX ADMIN — GABAPENTIN 800 MG: 100 CAPSULE ORAL at 12:57

## 2021-07-10 RX ADMIN — RISPERIDONE 0.5 MG: 1 TABLET, FILM COATED ORAL at 21:21

## 2021-07-10 RX ADMIN — IPRATROPIUM BROMIDE 1 SPRAY: 42 SPRAY NASAL at 09:42

## 2021-07-10 NOTE — PROGRESS NOTES
End of Shift Note    Bedside shift change report given to  Esteban Rai RN(oncoming nurse) by Villa Martinez (offgoing nurse). Report included the following information SBAR    Shift worked:  Day     Shift summary and any significant changes:    Patient Sinus tach on monitor. Pain medication through out day needed. D/c orders in place but requires home health to still be set up. Case management is working on the request.     Concerns for physician to address:   Home health     Zone phone for oncoming shift:      [phone]     Activity:  Activity Level: Up with Assistance  Number times ambulated in hallways past shift: 0  Number of times OOB to chair past shift: 3    Cardiac:   Cardiac Monitoring: Yes      Cardiac Rhythm: Sinus Tach    Access:   Current line(s): PIV     Genitourinary:   Urinary status: voiding    Respiratory:   O2 Device: None (Room air)  Chronic home O2 use?: NO  Incentive spirometer at bedside: YES     GI:  Last Bowel Movement Date: 07/10/21  Current diet:  ADULT DIET Regular  Passing flatus: YES  Tolerating current diet: YES       Pain Management:   Patient states pain is manageable on current regimen: YES    Skin:  Bryon Score: 20  Interventions: turn team    Patient Safety:  Fall Score:  Total Score: 2  Interventions: bed/chair alarm  High Fall Risk: Yes    Length of Stay:  Expected LOS: 2d 12h  Actual LOS: Lily Perkins 148

## 2021-07-10 NOTE — PROGRESS NOTES
Problem: Falls - Risk of  Goal: *Absence of Falls  Description: Document Maile Ba Fall Risk and appropriate interventions in the flowsheet. Outcome: Progressing Towards Goal  Note: Fall Risk Interventions:  Mobility Interventions: Assess mobility with egress test, Utilize walker, cane, or other assistive device, Bed/chair exit alarm    Mentation Interventions: Door open when patient unattended, Adequate sleep, hydration, pain control, Bed/chair exit alarm    Medication Interventions: Patient to call before getting OOB, Teach patient to arise slowly    Elimination Interventions: Call light in reach, Toileting schedule/hourly rounds              Problem: Patient Education: Go to Patient Education Activity  Goal: Patient/Family Education  Outcome: Progressing Towards Goal     Problem: Risk for Spread of Infection  Goal: Prevent transmission of infectious organism to others  Description: Prevent the transmission of infectious organisms to other patients, staff members, and visitors.   Outcome: Progressing Towards Goal     Problem: Patient Education:  Go to Education Activity  Goal: Patient/Family Education  Outcome: Progressing Towards Goal     Problem: Patient Education: Go to Patient Education Activity  Goal: Patient/Family Education  Outcome: Progressing Towards Goal     Problem: Simple Spine Procedure:  Day of Surgery  Goal: Off Pathway (Use only if patient is Off Pathway)  Outcome: Progressing Towards Goal  Goal: Activity/Safety  Outcome: Progressing Towards Goal  Goal: Consults, if ordered  Outcome: Progressing Towards Goal  Goal: Nutrition/Diet  Outcome: Progressing Towards Goal  Goal: Discharge Planning  Outcome: Progressing Towards Goal  Goal: Medications  Outcome: Progressing Towards Goal  Goal: Respiratory  Outcome: Progressing Towards Goal  Goal: Treatments/Interventions/Procedures  Outcome: Progressing Towards Goal  Goal: Psychosocial  Outcome: Progressing Towards Goal  Goal: *Verbalizes understanding of type and use of pain medication  Outcome: Progressing Towards Goal  Goal: *Optimal pain control at patient's stated goal  Outcome: Progressing Towards Goal  Goal: *Verbalizes/demonstrates understanding of proper body mechanics and use of stabilization device if ordered  Outcome: Progressing Towards Goal  Goal: *Activity level attained as ordered  Outcome: Progressing Towards Goal  Goal: *Active bowel sounds  Outcome: Progressing Towards Goal  Goal: *Respiratory status stable  Outcome: Progressing Towards Goal  Goal: *Adequate urinary output  Outcome: Progressing Towards Goal  Goal: *Hemodynamically stable  Outcome: Progressing Towards Goal     Problem: Simple Spine Procedure:  Post Op Day 1/Day of Discharge  Goal: Off Pathway (Use only if patient is Off Pathway)  Outcome: Progressing Towards Goal  Goal: Activity/Safety  Outcome: Progressing Towards Goal  Goal: Nutrition/Diet  Outcome: Progressing Towards Goal  Goal: Discharge Planning  Outcome: Progressing Towards Goal  Goal: Medications  Outcome: Progressing Towards Goal  Goal: Respiratory  Outcome: Progressing Towards Goal  Goal: Treatments/Interventions/Procedures  Outcome: Progressing Towards Goal  Goal: Psychosocial  Outcome: Progressing Towards Goal     Problem: Simple Spine Procedure: Discharge Outcomes  Goal: *Optimal pain control at patient's stated goal  Outcome: Progressing Towards Goal  Goal: *Demonstrates ability to place and remove stabilization device  Outcome: Progressing Towards Goal  Goal: *Progress independence mobility/activities (eg: Mobility precautions)  Outcome: Progressing Towards Goal  Goal: *Resumes normal function of bladder and bowel  Outcome: Progressing Towards Goal  Goal: *Lungs clear or at baseline  Outcome: Progressing Towards Goal  Goal: *Verbalizes name, dosage, time, side effects, and number of days to continue medications  Outcome: Progressing Towards Goal  Goal: *Modified independence with transfers, ambulation on levels with assistance devices, stair climbing, ADL's  Outcome: Progressing Towards Goal  Goal: *Describes follow-up/return visits to physicians  Outcome: Progressing Towards Goal  Goal: *Describes available resources and support systems  Outcome: Progressing Towards Goal  Goal: *Labs within defined limits  Outcome: Progressing Towards Goal  Goal: *Tolerating diet  Outcome: Progressing Towards Goal

## 2021-07-10 NOTE — DISCHARGE INSTRUCTIONS
[address] [facility]    Discharge Instruction Sheet: Anterior Cervical Fusion    DR. Jose Khan    Pain control:  Typically, we will prescribe a narcotic usually 1-2 tabs every four hours is sufficient for the pain. Most patients need this only for the first few weeks. You should discontinue this as the pain decreases. You should not drive while taking any narcotic pain medications. Constipation  Pain medicines and anesthesia can be constipating-this can be prevented by gentle physical activity and drinking plenty of fluid. It should be treated with over-the-counter medications such as Miralax or suppositories, and/or Fleets enema. You should have a bowel movement at least every other day following surgery. Incision care   Keep this area clean and dry. Do not remove the dressing. DO NOT take a tub bath or go swimming until cleared by your doctor. DO NOT apply lotions, oils, or creams to incision. Cover the wound with an impermiable dressing to shower for then next 5 days, then no cover is needed. Wear cervical collar for comfort. If staples are in place, they should be removed about 14-20 days after surgery. To increase and promote healing:   Stop Smoking (or at least cut back on smoking).  Eat a well-balanced diet (high in protein and vitamin C)   If your appetite is poor, consider nutritional supplements like Ensure, Glucerna, or [place] Instant Breakfast.   If you are diabetic, controlling you blood sugars is very important to prevent infection and promote wound healing. Nutrition:   If you were on a supplement such as Ensure or Glucerna) while in the hospital, please continue using them with each meal for the next 30 days.    Eat a well-balanced diet - High in protein, high in vitamins and minerals, especially vitamin C and zinc.     Restrictions:  Limited bending at waist  Lift no more than 10 pounds    Warning signs :   Please call your physician IMMEDIATELY at [phone] if you have:   If you have throat soreness that worsens   If you have difficulty swallowing.  If you have any difficulty breathing   Bleeding from incision that is constant.  Change in mental status (unusual behavior or confusion)   If your incision develops redness or swelling   Change in wound drainage (increase in amount, color, or foul odor)   Hewitt over 101.5 degrees Fahrenheit    Headache that is not relieved with pain medication   Tenderness or redness in the calf of your leg    Emergency: CALL 911 if you have:   Any Difficulty Breathing or Shortness of breath   Difficulty Swallowing   Chest pain   Localized chest pain when coughing or taking a deep breath    Timothy Brownlee    Follow-up  Please call Dr. Wander Peters office for a follow up appointment in 2-3 weeks at [phone]. You can return to work when cleared by a physician. During normal business hours you may reach Dr. Billye Kanner' team directly at [phone] if you have concerns or questions. Patient Discharge Instructions    Timothy Brownlee / [id number] : 1960    Admitted 2021 Discharged: 7/10/2021         DISCHARGE DIAGNOSIS:   Cervical stenosis s/p anterior cervical disc fusion  Hypertension uncontrolled  Chronic alcoholism with concern for alcohol withdrawal  Chronic respiratory failure with hypoxia on home oxygen  Emphysema  History of tobacco abuse  Abnormal CT of the chest  TANIA not on CPAP  CT of the chest showed   Suspected sarcoidosis   Hypokalemia  History of seizure disorder  gerd  Depression  Anxiety       Take Home Medications     {Medication reconciliation information is now added to the patient's AVS automatically when it is printed. There is no need to use this SmartLink in discharge instructions.   Highlight this text and delete it to clear this message}      General drug facts      If you have a very bad allergy, wear an allergy ID at all times.  It is important that you take the medication exactly as they are prescribed.  Keep your medication in the bottles provided by the pharmacist.  Peter Porters a list of all your drugs (prescription, natural products, vitamins, OTC) with you. Give this list to your doctor.  Do not take other medications without consulting your doctor.   Do not share your drugs with others and do not take anyone else's drugs.  Keep all drugs out of the reach of children and pets.   Most drugs may be thrown away in household trash after mixing with coffee grounds or adan litter and sealing in a plastic bag.   Keep a list Call your doctor for help with any side effects. If in the [place] you may also call the FDA at 5-428-BGX-8612     Talk with the doctor before starting any new drug, including OTC, natural products, or vitamins. What to do at Home    1. Recommended diet: Cardiac     2. Recommended activity:as above    3. If you experience any of the following symptoms then please call your primary care physician or return to the emergency room if you cannot get hold of your doctor:    4. Wound Care: none     5. Lab work: cbc and bmp in 1 week     6. Stop smocking    7. Continue to wear oxygen and as previously     8. Bring these papers with you to your follow up appointments. The papers will help your doctors be sure to continue the care plan from the hospital.      If you have questions regarding the hospital related prescriptions or hospital related issues please call [facility] at [phone].  You can always direct your questions to your primary care doctor if you are unable to reach your hospital physician; your PCP works as an extension of your hospital doctor just like your hospital doctor is an extension of your PCP for your time at the hospital [facility], [facility])      Follow-up with:   PCP: Martín Evans MD  Follow-up Information     Follow up With Specialties Details Why Contact Info    Jami Osborne MD [facility] Orthopedic Surgery Schedule an appointment as soon as possible for a visit in 2 weeks  [address]  [address] [id number] [phone]      Margarita York MD Internal Medicine   [address] [address] [phone]      Yana Medrano MD Orthopedic Surgery Schedule an appointment as soon as possible for a visit in 1 week  [address]  [address] [facility],[address]  [address] [facility]  [phone]             Please call for your own appointment        Information obtained by :  I understand that if any problems occur once I am at home I am to contact my physician. I understand and acknowledge receipt of the instructions indicated above.                                                                                                                                            Physician's or R.N.'s Signature                                                                  Date/Time                                                                                                                                              Patient or Representative Signature                                                          Date/Time

## 2021-07-10 NOTE — PROGRESS NOTES
Transition of Care Plan:    RUR: 16%  Disposition:   Follow up appointments: Unable to make over w/e  DME needed: DME not recommended for D/C   [facility] Discharge[facility]  Pt to D/C home w/keys    IM Medicare letter: N/A  Caregiver Contact: Jose Maria De La Torregard [phone]  Discharge Caregiver contacted prior to discharge? To be contacted upon D/C     CM F/U with referrals sent to [facility], [facility] (requesting for more information, however admissions not available over w/e) [facility] at [facility]. Additional referrals sent to: [email or web address] [facility]) and [facility].      Rajwinder Vincent LCSW  Ext # [phone]

## 2021-07-10 NOTE — PROGRESS NOTES
End of Shift Note    Bedside shift change report given to Cody Phelps (oncoming nurse) by [facility] (offgoing nurse). Report included the following information SBAR    Shift worked:  night     Shift summary and any significant changes:     Pt still in severe pain overnight, requiring multiple pain meds throughout night. Tachy between 90 - 100 consistently. Concerns for physician to address:        Zone phone for oncoming shift:           Activity:  Activity Level: Up with Assistance  Number times ambulated in hallways past shift: 0  Number of times OOB to chair past shift: 3    Cardiac:   Cardiac Monitoring: Yes      Cardiac Rhythm: Sinus Tach    Access:   Current line(s): PIV     Genitourinary:   Urinary status: voiding    Respiratory:   O2 Device: None (Room air)  Chronic home O2 use?: NO  Incentive spirometer at bedside: YES     GI:  Last Bowel Movement Date: 07/09/21  Current diet:  ADULT DIET Regular  Passing flatus: YES  Tolerating current diet: YES       Pain Management:   Patient states pain is manageable on current regimen: YES    Skin:  Bryon Score: 20  Interventions: turn team    Patient Safety:  Fall Score:  Total Score: 2  Interventions: bed/chair alarm  High Fall Risk: Yes    Length of Stay:  Expected LOS: 2d 12h  Actual LOS: 5      [facility]

## 2021-07-11 LAB — TSH SERPL DL<=0.05 MIU/L-ACNC: 0.9 UIU/ML (ref 0.36–3.74)

## 2021-07-11 PROCEDURE — 74011000250 HC RX REV CODE- 250: Performed by: NURSE PRACTITIONER

## 2021-07-11 PROCEDURE — 74011250637 HC RX REV CODE- 250/637: Performed by: ORTHOPAEDIC SURGERY

## 2021-07-11 PROCEDURE — 84443 ASSAY THYROID STIM HORMONE: CPT

## 2021-07-11 PROCEDURE — 74011000258 HC RX REV CODE- 258: Performed by: INTERNAL MEDICINE

## 2021-07-11 PROCEDURE — 36415 COLL VENOUS BLD VENIPUNCTURE: CPT

## 2021-07-11 PROCEDURE — 65270000029 HC RM PRIVATE

## 2021-07-11 PROCEDURE — 94640 AIRWAY INHALATION TREATMENT: CPT

## 2021-07-11 PROCEDURE — 74011250637 HC RX REV CODE- 250/637: Performed by: NURSE PRACTITIONER

## 2021-07-11 PROCEDURE — 74011250637 HC RX REV CODE- 250/637: Performed by: INTERNAL MEDICINE

## 2021-07-11 PROCEDURE — 94760 N-INVAS EAR/PLS OXIMETRY 1: CPT

## 2021-07-11 PROCEDURE — 74011250636 HC RX REV CODE- 250/636: Performed by: INTERNAL MEDICINE

## 2021-07-11 PROCEDURE — 2709999900 HC NON-CHARGEABLE SUPPLY

## 2021-07-11 RX ORDER — CHLORDIAZEPOXIDE HYDROCHLORIDE 5 MG/1
10 CAPSULE, GELATIN COATED ORAL 3 TIMES DAILY
Status: DISCONTINUED | OUTPATIENT
Start: 2021-07-11 | End: 2021-07-12

## 2021-07-11 RX ADMIN — LEVETIRACETAM 1000 MG: 100 INJECTION, SOLUTION INTRAVENOUS at 21:38

## 2021-07-11 RX ADMIN — MONTELUKAST 10 MG: 10 TABLET, FILM COATED ORAL at 21:39

## 2021-07-11 RX ADMIN — THIAMINE HCL TAB 100 MG 100 MG: 100 TAB at 09:08

## 2021-07-11 RX ADMIN — ARFORMOTEROL TARTRATE: 15 SOLUTION RESPIRATORY (INHALATION) at 07:39

## 2021-07-11 RX ADMIN — OXYCODONE 10 MG: 5 TABLET ORAL at 10:48

## 2021-07-11 RX ADMIN — CYANOCOBALAMIN TAB 500 MCG 1000 MCG: 500 TAB at 09:08

## 2021-07-11 RX ADMIN — LEVETIRACETAM 1000 MG: 100 INJECTION, SOLUTION INTRAVENOUS at 09:07

## 2021-07-11 RX ADMIN — DOCUSATE SODIUM 50MG AND SENNOSIDES 8.6MG 1 TABLET: 8.6; 5 TABLET, FILM COATED ORAL at 17:04

## 2021-07-11 RX ADMIN — OXYCODONE 10 MG: 5 TABLET ORAL at 14:05

## 2021-07-11 RX ADMIN — CARBAMIDE PEROXIDE 6.5% 5 DROP: 6.5 LIQUID AURICULAR (OTIC) at 09:09

## 2021-07-11 RX ADMIN — FLUTICASONE PROPIONATE 2 SPRAY: 50 SPRAY, METERED NASAL at 09:11

## 2021-07-11 RX ADMIN — GUAIFENESIN 1200 MG: 600 TABLET, EXTENDED RELEASE ORAL at 09:09

## 2021-07-11 RX ADMIN — LISINOPRIL 20 MG: 20 TABLET ORAL at 09:08

## 2021-07-11 RX ADMIN — Medication 10 ML: at 22:00

## 2021-07-11 RX ADMIN — PYRIDOXINE HCL TAB 50 MG 50 MG: 50 TAB at 17:04

## 2021-07-11 RX ADMIN — BUSPIRONE HYDROCHLORIDE 15 MG: 5 TABLET ORAL at 21:38

## 2021-07-11 RX ADMIN — GABAPENTIN 800 MG: 100 CAPSULE ORAL at 09:08

## 2021-07-11 RX ADMIN — RISPERIDONE 0.5 MG: 1 TABLET, FILM COATED ORAL at 21:39

## 2021-07-11 RX ADMIN — RISPERIDONE 0.5 MG: 1 TABLET, FILM COATED ORAL at 09:08

## 2021-07-11 RX ADMIN — RISPERIDONE 0.5 MG: 1 TABLET, FILM COATED ORAL at 17:04

## 2021-07-11 RX ADMIN — BUSPIRONE HYDROCHLORIDE 15 MG: 5 TABLET ORAL at 09:08

## 2021-07-11 RX ADMIN — GABAPENTIN 800 MG: 100 CAPSULE ORAL at 21:38

## 2021-07-11 RX ADMIN — OXYCODONE 10 MG: 5 TABLET ORAL at 03:20

## 2021-07-11 RX ADMIN — PANTOPRAZOLE SODIUM 40 MG: 40 TABLET, DELAYED RELEASE ORAL at 06:33

## 2021-07-11 RX ADMIN — IPRATROPIUM BROMIDE 1 SPRAY: 42 SPRAY NASAL at 21:45

## 2021-07-11 RX ADMIN — DIAZEPAM 5 MG: 5 TABLET ORAL at 10:48

## 2021-07-11 RX ADMIN — FERROUS SULFATE TAB 325 MG (65 MG ELEMENTAL FE) 325 MG: 325 (65 FE) TAB at 06:33

## 2021-07-11 RX ADMIN — Medication 1 AMPULE: at 09:06

## 2021-07-11 RX ADMIN — Medication 10 ML: at 06:33

## 2021-07-11 RX ADMIN — BUSPIRONE HYDROCHLORIDE 15 MG: 5 TABLET ORAL at 17:04

## 2021-07-11 RX ADMIN — TRAZODONE HYDROCHLORIDE 100 MG: 100 TABLET ORAL at 21:39

## 2021-07-11 RX ADMIN — OXYCODONE 10 MG: 5 TABLET ORAL at 06:33

## 2021-07-11 RX ADMIN — ARFORMOTEROL TARTRATE: 15 SOLUTION RESPIRATORY (INHALATION) at 21:14

## 2021-07-11 RX ADMIN — GUAIFENESIN 1200 MG: 600 TABLET, EXTENDED RELEASE ORAL at 17:03

## 2021-07-11 RX ADMIN — Medication 10 ML: at 14:05

## 2021-07-11 RX ADMIN — ACETAMINOPHEN 1000 MG: 500 TABLET ORAL at 06:33

## 2021-07-11 RX ADMIN — CHLORDIAZEPOXIDE HYDROCHLORIDE 10 MG: 5 CAPSULE ORAL at 21:39

## 2021-07-11 RX ADMIN — ACETAMINOPHEN 1000 MG: 500 TABLET ORAL at 12:05

## 2021-07-11 RX ADMIN — GABAPENTIN 800 MG: 100 CAPSULE ORAL at 12:05

## 2021-07-11 RX ADMIN — ATORVASTATIN CALCIUM 40 MG: 40 TABLET, FILM COATED ORAL at 21:39

## 2021-07-11 RX ADMIN — GABAPENTIN 800 MG: 100 CAPSULE ORAL at 17:04

## 2021-07-11 RX ADMIN — IPRATROPIUM BROMIDE 1 SPRAY: 42 SPRAY NASAL at 17:05

## 2021-07-11 RX ADMIN — DULOXETINE 120 MG: 30 CAPSULE, DELAYED RELEASE ORAL at 09:08

## 2021-07-11 RX ADMIN — ACETAMINOPHEN 1000 MG: 500 TABLET ORAL at 17:04

## 2021-07-11 RX ADMIN — CYCLOBENZAPRINE 10 MG: 10 TABLET, FILM COATED ORAL at 21:49

## 2021-07-11 RX ADMIN — DOCUSATE SODIUM 50MG AND SENNOSIDES 8.6MG 1 TABLET: 8.6; 5 TABLET, FILM COATED ORAL at 09:08

## 2021-07-11 RX ADMIN — OXYCODONE 10 MG: 5 TABLET ORAL at 17:04

## 2021-07-11 RX ADMIN — FOLIC ACID 1 MG: 1 TABLET ORAL at 09:09

## 2021-07-11 RX ADMIN — CHLORDIAZEPOXIDE HYDROCHLORIDE 25 MG: 25 CAPSULE ORAL at 09:08

## 2021-07-11 RX ADMIN — IPRATROPIUM BROMIDE 1 SPRAY: 42 SPRAY NASAL at 09:10

## 2021-07-11 RX ADMIN — PYRIDOXINE HCL TAB 50 MG 50 MG: 50 TAB at 09:09

## 2021-07-11 RX ADMIN — CHLORDIAZEPOXIDE HYDROCHLORIDE 10 MG: 5 CAPSULE ORAL at 17:04

## 2021-07-11 RX ADMIN — METOPROLOL SUCCINATE 50 MG: 50 TABLET, EXTENDED RELEASE ORAL at 09:09

## 2021-07-11 RX ADMIN — PANTOPRAZOLE SODIUM 40 MG: 40 TABLET, DELAYED RELEASE ORAL at 17:04

## 2021-07-11 RX ADMIN — DIAZEPAM 5 MG: 5 TABLET ORAL at 03:43

## 2021-07-11 RX ADMIN — OXYCODONE 10 MG: 5 TABLET ORAL at 21:49

## 2021-07-11 NOTE — PROGRESS NOTES
End of Shift Note    Bedside shift change report given to  Carlos Manuel Gudino RN(oncoming nurse) by Maria De Jesus Cota (offgoing nurse). Report included the following information SBAR    Shift worked:  Day     Shift summary and any significant changes:    Pain medication through out day needed. D/c orders in place but requires home health to still be set up. Case management is working on the request.     Concerns for physician to address:   Home health     Zone phone for oncoming shift:      [phone]     Activity:  Activity Level: Up with Assistance  Number times ambulated in hallways past shift: 0  Number of times OOB to chair past shift: 3    Cardiac:   Cardiac Monitoring: Yes      Cardiac Rhythm: Sinus Tach    Access:   Current line(s): PIV     Genitourinary:   Urinary status: voiding    Respiratory:   O2 Device: None (Room air)  Chronic home O2 use?: NO  Incentive spirometer at bedside: YES     GI:  Last Bowel Movement Date: 07/10/21  Current diet:  ADULT DIET Regular  Passing flatus: YES  Tolerating current diet: YES       Pain Management:   Patient states pain is manageable on current regimen: YES    Skin:  Bryon Score: 20  Interventions: turn team    Patient Safety:  Fall Score:  Total Score: 2  Interventions: bed/chair alarm  High Fall Risk: Yes    Length of Stay:  Expected LOS: 2d 12h  Actual LOS: [address]

## 2021-07-11 NOTE — PROGRESS NOTES
End of Shift Note    Bedside shift change report given to [facility] RN (oncoming nurse) by Nuha Saldana (offgoing nurse). Report included the following information SBAR, Kardex, Intake/Output, MAR and Recent Results    Shift worked:  Nights     Shift summary and any significant changes:     *Uneventful night. Pain meds and valium given with good effect. AM labs drawn. Ambulated with cane to bathroom.       Concerns for physician to address:       Zone phone for oncoming shift:   [phone] [facility]

## 2021-07-11 NOTE — PROGRESS NOTES
Hospitalist Progress Note    NAME: John Mckeon   :  1960   MRN:  [id number]       Assessment / Plan:  Cervical stenosis s/p anterior cervical disc fusion  -Postoperative care per neurosurgery. Cervical collar in place.  -Pain control     Hypertension uncontrolled  -Continue metoprolol 50 mg daily. Pain control should help with blood pressure control    Chronic alcoholism with concern for alcohol withdrawal  -Continue alcohol withdrawal protocol with Ativan. CIWA score is 2. Continue multivitamin  -Continue Librium taper     Chronic respiratory failure with hypoxia on home oxygen  Emphysema  History of tobacco abuse  Abnormal CT of the chest  TANIA not on CPAP  CT of the chest showed   Suspected sarcoidosis  1. Bilateral parenchymal scarring and traction bronchiectasis without evidence  of honeycombing. Given the upper lobe predominance of findings and multiple  calcified lymph nodes, this may be due to sarcoidosis. 2. 9.3 mm left upper lobe pulmonary nodule with internal central calcification,  likely a granuloma in this patient with multiple calcified mediastinal lymph  nodes. This could be followed up with CT scan. 3. Emphysema. Continue with home inhalers with pharmacy substitution, continue with nebs  Continue with montelukast  -Will arrange out pt f/u with pulmonary   -pt informed that he has a follow up with pulmonologist at [facility] and follow ups with them. His pulmonary nodules at least 13years old and are stable according to him.      Hypokalemia  -Replaced and recheck in am.       History of seizure disorder  gerd  Patient reported that he had seizures as a side effect of Chantix  He has been on Keppra for the last 7 years, he supposed to take Keppra 1000 twice daily but he only takes it daily  Cont keppra but change to IV  Continue Protonix    Depression  Anxiety  Continue with risperidone, trazodone  Continue BuSparArtiealta  Will reach out to his primary psychiatrist about my concerns for polypharmacy    Discharge held today as home health could not be set up    ROLAN: 7/10    Code Status: Full  DVT Prophylaxis: Per surgery         Body mass index is 29.99 kg/m². Code status: Full  Prophylaxis: Per primary  Recommended Disposition: Home w/Family     Subjective:     Chief Complaint / Reason for Physician Visit  Much more alert and awake today. Reports that neck pain is controlled  CIWA score is 2 this morning  Potassium low and replaced  Was tachycardic last night and received IV metoprolol        Review of Systems:  Symptom Y/N Comments  Symptom Y/N Comments   Fever/Chills    Chest Pain     Poor Appetite    Edema     Cough    Abdominal Pain     Sputum    Joint Pain     SOB/YOUNG    Pruritis/Rash     Nausea/vomit    Tolerating PT/OT     Diarrhea    Tolerating Diet     Constipation    Other       Could NOT obtain due to:      Objective:     VITALS:   Last 24hrs VS reviewed since prior progress note. Most recent are:  Patient Vitals for the past 24 hrs:   Temp Pulse Resp BP SpO2   07/11/21 0741     99 %   07/11/21 0732 98.4 °F (36.9 °C) (!) 105 18 127/83 96 %   07/11/21 0343  98  116/75    07/10/21 2231 98 °F (36.7 °C) 93 20 102/61 94 %   07/10/21 2112 98 °F (36.7 °C) 97 20 (!) 98/55 94 %   07/10/21 1957     94 %   07/10/21 1521 98 °F (36.7 °C) (!) 110 20 128/80 97 %   07/10/21 1301 97.9 °F (36.6 °C) (!) 106 19 108/79 97 %       Intake/Output Summary (Last 24 hours) at 7/11/2021 1011  Last data filed at 7/10/2021 2112  Gross per 24 hour   Intake 360 ml   Output    Net 360 ml        PHYSICAL EXAM:  General: no acute distress    EENT:  EOMI. Anicteric sclerae. MMM, cervical collar present  Resp:  CTA bilaterally, no wheezing or rales. No accessory muscle use  CV:  Regular  rhythm,  No edema  GI:  Soft, Non distended, Non tender.  +Bowel sounds  Neurologic:  Alert, awake and oriented x3  Psych:   Pleasant  Skin:  No rashes.   No jaundice    Reviewed most current lab test results and cultures  YES  Reviewed most current radiology test results   YES  Review and summation of old records today    NO  Reviewed patient's current orders and MAR    YES  PMH/SH reviewed - no change compared to H&P          Current Facility-Administered Medications:     chlordiazePOXIDE (LIBRIUM) capsule 25 mg, 25 mg, Oral, TID, Srinivas Up MD, 25 mg at 07/11/21 0908    morphine injection 2 mg, 2 mg, IntraVENous, Q4H PRN, Jason Up MD    metoprolol succinate (TOPROL-XL) XL tablet 50 mg, 50 mg, Oral, DAILY, Srinivas eDe MD, 50 mg at 07/11/21 0909    levETIRAcetam (KEPPRA) 1,000 mg in 0.9% sodium chloride 100 mL IVPB, 1,000 mg, IntraVENous, Q12H, Srinivas Dee MD, Last Rate: 400 mL/hr at 07/09/21 0859, 1,000 mg at 07/11/21 0907    busPIRone (BUSPAR) tablet 15 mg, 15 mg, Oral, TID, Anderson CHI MD, 15 mg at 07/11/21 0908    risperiDONE (RisperDAL) tablet 0.5 mg, 0.5 mg, Oral, TID, Srinivas Up MD, 0.5 mg at 07/11/21 0908    traZODone (DESYREL) tablet 100 mg, 100 mg, Oral, QHS, Srinivas Dee MD, 100 mg at 07/10/21 2121    phenol throat spray (CHLORASEPTIC) 1 Spray, 1 Spray, Oral, PRN, Heike Donohue NP, 1 Spray at 07/07/21 1017    fluticasone propionate (FLONASE) 50 mcg/actuation nasal spray 2 Spray, 2 Spray, Both Nostrils, DAILY, Heike Donohue NP, 2 Spray at 07/11/21 0911    ipratropium (ATROVENT) 42 mcg (0.06 %) nasal spray 1 Spray, 1 Spray, Both Nostrils, TID, Heike Donohue NP, 1 Spray at 07/11/21 0910    LORazepam (ATIVAN) injection 2 mg, 2 mg, IntraVENous, Q1H PRN, Mikki Salazar MD, 2 mg at 07/08/21 0602    LORazepam (ATIVAN) injection 4 mg, 4 mg, IntraVENous, Q1H PRN, Jason Up MD    folic acid (FOLVITE) tablet 1 mg, 1 mg, Oral, DAILY, Srinivas Dee MD, 1 mg at 07/11/21 0909    thiamine mononitrate (B-1) tablet 100 mg, 100 mg, Oral, DAILY, Jez ARTHUR MD, 100 mg at 07/11/21 0908    arformoterol 15 mcg/budesonide 0.5 mg neb solution, , Nebulization, BID, Sujata Delatorre NP, Given at 07/11/21 7165    DULoxetine (CYMBALTA) capsule 120 mg, 120 mg, Oral, DAILY, Sujata Delatorre NP, 120 mg at 07/11/21 0908    montelukast (SINGULAIR) tablet 10 mg, 10 mg, Oral, QHS, Sujata Delatorre NP, 10 mg at 07/10/21 2121    atorvastatin (LIPITOR) tablet 40 mg, 40 mg, Oral, QHS, Sujata Delatorre NP, 40 mg at 07/10/21 2121    labetaloL (NORMODYNE;TRANDATE) injection 10 mg, 10 mg, IntraVENous, Q4H PRN, Chao Lynne MD, 10 mg at 07/08/21 1149    lisinopriL (PRINIVIL, ZESTRIL) tablet 20 mg, 20 mg, Oral, DAILY, Srinivas Dee MD, 20 mg at 07/11/21 0908    gabapentin (NEURONTIN) capsule 800 mg, 800 mg, Oral, QID, Edilson ARTHUR MD, 800 mg at 07/11/21 0908    cyanocobalamin (VITAMIN B12) tablet 1,000 mcg, 1,000 mcg, Oral, DAILY, Edilson ARTHUR MD, 1,000 mcg at 07/11/21 0908    carbamide peroxide (DEBROX) 6.5 % otic solution 5 Drop, 5 Drop, Both Ears, BID, Edilson ARTHUR MD, 5 Drop at 07/11/21 0909    [START ON 7/12/2021] ergocalciferol capsule 50,000 Units, 50,000 Units, Oral, Q7D, Americo Mckenzie MD    ferrous sulfate tablet 325 mg, 1 Tablet, Oral, ACB, Edilson ARTHUR MD, 325 mg at 07/11/21 1367    guaiFENesin ER (MUCINEX) tablet 1,200 mg, 1,200 mg, Oral, BID, Edilson ARTHUR MD, 1,200 mg at 07/11/21 0909    pantoprazole (PROTONIX) tablet 40 mg, 40 mg, Oral, ACB&D, Edilson ARTHUR MD, 40 mg at 07/11/21 7909    pyridoxine (vitamin B6) (VITAMIN B-6) tablet 50 mg, 50 mg, Oral, BID, Edilson ARTHUR MD, 50 mg at 07/11/21 0909    ipratropium (ATROVENT) 0.02 % nebulizer solution 0.5 mg, 0.5 mg, Nebulization, Q6H PRN, Americo Mckenzie MD    sodium chloride (NS) flush 5-40 mL, 5-40 mL, IntraVENous, Q8H, Ken Olmstead MD, 10 mL at 07/11/21 7654    sodium chloride (NS) flush 5-40 mL, 5-40 mL, IntraVENous, PRN, Americo Mckenzie MD    acetaminophen (TYLENOL) tablet 1,000 mg, 1,000 mg, Oral, Q6H, Edilson ARTHUR MD, 1,000 mg at 07/11/21 1270    oxyCODONE IR (ROXICODONE) tablet 5 mg, 5 mg, Oral, Q3H PRN, Demetrius ARTHUR MD, 5 mg at 07/08/21 1825    oxyCODONE IR (ROXICODONE) tablet 10 mg, 10 mg, Oral, Q3H PRN, Tonio Schultz MD, 10 mg at 07/11/21 0605    hydrOXYzine HCL (ATARAX) tablet 10 mg, 10 mg, Oral, Q8H PRN, Tonio Schultz MD    senna-docusate (PERICOLACE) 8.6-50 mg per tablet 1 Tablet, 1 Tablet, Oral, BID, Tonio Schultz MD, 1 Tablet at 07/11/21 0908    polyethylene glycol (MIRALAX) packet 17 g, 17 g, Oral, DAILY, [facility]    bisacodyL (DULCOLAX) suppository 10 mg, 10 mg, Rectal, DAILY PRN, Tonio Schultz MD    benzocaine-menthoL (CHLORASEPTIC MAX) lozenge 1 Lozenge, 1 Lozenge, Oral, PRN, Tonio Schultz MD, 1 Lozenge at 07/06/21 2203    diazePAM (VALIUM) tablet 5 mg, 5 mg, Oral, Q6H PRN, Demetrius ARTHUR MD, 5 mg at 07/11/21 0343    cyclobenzaprine (FLEXERIL) tablet 10 mg, 10 mg, Oral, BID PRN, Tonio Schultz MD    albuterol-ipratropium (DUO-NEB) 2.5 MG-0.5 MG/3 ML, 3 mL, Nebulization, Q4H PRN, Tonio Schultz MD    alcohol 62% (NOZIN) nasal  1 Ampule, 1 Ampule, Topical, Q12H, Tonio Schultz MD, 1 Ampule at 07/11/21 2129    sodium chloride (NS) flush 5-40 mL, 5-40 mL, IntraVENous, Q8H, Cesar Koroma PA-C, 10 mL at 07/11/21 1863    sodium chloride (NS) flush 5-40 mL, 5-40 mL, IntraVENous, PRN, Cesar Koroma PA-C    naloxone [facility]) injection 0.4 mg, 0.4 mg, IntraVENous, PRN, Zeigler KEO Venegas  ________________________________________________________________________  Care Plan discussed with:    Comments   Patient y    Family      RN y    Care Manager     Consultant                        Multidiciplinary team rounds were held today with , nursing, pharmacist and clinical coordinator. Patient's plan of care was discussed; medications were reviewed and discharge planning was addressed.      ________________________________________________________________________  Total NON critical care TIME:  35   Minutes    Total CRITICAL CARE TIME Spent:   Minutes non procedure based      Comments   >50% of visit spent in counseling and coordination of care     ________________________________________________________________________  Bassem Fournier MD     Procedures: see electronic medical records for all procedures/Xrays and details which were not copied into this note but were reviewed prior to creation of Plan. LABS:  I reviewed today's most current labs and imaging studies.   Pertinent labs include:  Recent Labs     07/10/21  0016 07/09/21  0200   WBC 7.7 8.9   HGB 11.2* 11.9*   HCT 34.3* 36.3*    205     Recent Labs     07/10/21  0016 07/09/21  0200    140   K 3.7 3.4*   * 108   CO2 25 27   * 94   BUN 12 7   CREA 0.77 0.80   CA 9.2 9.1   MG  --  1.9   ALB  --  3.0*   TBILI  --  0.7   ALT  --  19       Signed: Bassem Fournier MD

## 2021-07-12 ENCOUNTER — APPOINTMENT (OUTPATIENT)
Dept: CT IMAGING | Age: 61
DRG: 321 | End: 2021-07-12
Attending: INTERNAL MEDICINE
Payer: MEDICAID

## 2021-07-12 ENCOUNTER — APPOINTMENT (OUTPATIENT)
Dept: GENERAL RADIOLOGY | Age: 61
DRG: 321 | End: 2021-07-12
Attending: INTERNAL MEDICINE
Payer: MEDICAID

## 2021-07-12 LAB
ALBUMIN SERPL-MCNC: 3.3 G/DL (ref 3.5–5)
ALBUMIN/GLOB SERPL: 0.8 {RATIO} (ref 1.1–2.2)
ALP SERPL-CCNC: 112 U/L (ref 45–117)
ALT SERPL-CCNC: 43 U/L (ref 12–78)
ANION GAP SERPL CALC-SCNC: 2 MMOL/L (ref 5–15)
AST SERPL-CCNC: 27 U/L (ref 15–37)
BILIRUB SERPL-MCNC: 0.3 MG/DL (ref 0.2–1)
BUN SERPL-MCNC: 10 MG/DL (ref 6–20)
BUN/CREAT SERPL: 10 (ref 12–20)
CALCIUM SERPL-MCNC: 9.7 MG/DL (ref 8.5–10.1)
CHLORIDE SERPL-SCNC: 106 MMOL/L (ref 97–108)
CO2 SERPL-SCNC: 32 MMOL/L (ref 21–32)
CREAT SERPL-MCNC: 0.97 MG/DL (ref 0.7–1.3)
ERYTHROCYTE [DISTWIDTH] IN BLOOD BY AUTOMATED COUNT: 13.9 % (ref 11.5–14.5)
GLOBULIN SER CALC-MCNC: 4.2 G/DL (ref 2–4)
GLUCOSE BLD STRIP.AUTO-MCNC: 121 MG/DL (ref 65–117)
GLUCOSE SERPL-MCNC: 99 MG/DL (ref 65–100)
HCT VFR BLD AUTO: 37 % (ref 36.6–50.3)
HGB BLD-MCNC: 11.5 G/DL (ref 12.1–17)
MCH RBC QN AUTO: 26.5 PG (ref 26–34)
MCHC RBC AUTO-ENTMCNC: 31.1 G/DL (ref 30–36.5)
MCV RBC AUTO: 85.3 FL (ref 80–99)
NRBC # BLD: 0 K/UL (ref 0–0.01)
NRBC BLD-RTO: 0 PER 100 WBC
PLATELET # BLD AUTO: 285 K/UL (ref 150–400)
PMV BLD AUTO: 9.1 FL (ref 8.9–12.9)
POTASSIUM SERPL-SCNC: 4.2 MMOL/L (ref 3.5–5.1)
PROT SERPL-MCNC: 7.5 G/DL (ref 6.4–8.2)
RBC # BLD AUTO: 4.34 M/UL (ref 4.1–5.7)
SERVICE CMNT-IMP: ABNORMAL
SODIUM SERPL-SCNC: 140 MMOL/L (ref 136–145)
TROPONIN I SERPL-MCNC: <0.05 NG/ML
WBC # BLD AUTO: 7.4 K/UL (ref 4.1–11.1)

## 2021-07-12 PROCEDURE — 94760 N-INVAS EAR/PLS OXIMETRY 1: CPT

## 2021-07-12 PROCEDURE — 74011250637 HC RX REV CODE- 250/637: Performed by: ORTHOPAEDIC SURGERY

## 2021-07-12 PROCEDURE — 82962 GLUCOSE BLOOD TEST: CPT

## 2021-07-12 PROCEDURE — 74011000258 HC RX REV CODE- 258: Performed by: INTERNAL MEDICINE

## 2021-07-12 PROCEDURE — 74011250636 HC RX REV CODE- 250/636: Performed by: INTERNAL MEDICINE

## 2021-07-12 PROCEDURE — 84484 ASSAY OF TROPONIN QUANT: CPT

## 2021-07-12 PROCEDURE — 2709999900 HC NON-CHARGEABLE SUPPLY

## 2021-07-12 PROCEDURE — 71045 X-RAY EXAM CHEST 1 VIEW: CPT

## 2021-07-12 PROCEDURE — 94640 AIRWAY INHALATION TREATMENT: CPT

## 2021-07-12 PROCEDURE — 74011000250 HC RX REV CODE- 250: Performed by: NURSE PRACTITIONER

## 2021-07-12 PROCEDURE — 70450 CT HEAD/BRAIN W/O DYE: CPT

## 2021-07-12 PROCEDURE — 85027 COMPLETE CBC AUTOMATED: CPT

## 2021-07-12 PROCEDURE — 74011250637 HC RX REV CODE- 250/637: Performed by: INTERNAL MEDICINE

## 2021-07-12 PROCEDURE — 74011250637 HC RX REV CODE- 250/637: Performed by: NURSE PRACTITIONER

## 2021-07-12 PROCEDURE — 80053 COMPREHEN METABOLIC PANEL: CPT

## 2021-07-12 PROCEDURE — 36415 COLL VENOUS BLD VENIPUNCTURE: CPT

## 2021-07-12 PROCEDURE — 65270000029 HC RM PRIVATE

## 2021-07-12 RX ORDER — CHLORDIAZEPOXIDE HYDROCHLORIDE 5 MG/1
10 CAPSULE, GELATIN COATED ORAL 3 TIMES DAILY
Status: COMPLETED | OUTPATIENT
Start: 2021-07-12 | End: 2021-07-12

## 2021-07-12 RX ORDER — LEVETIRACETAM 500 MG/1
1000 TABLET ORAL 2 TIMES DAILY
Status: DISCONTINUED | OUTPATIENT
Start: 2021-07-12 | End: 2021-07-14 | Stop reason: HOSPADM

## 2021-07-12 RX ADMIN — PYRIDOXINE HCL TAB 50 MG 50 MG: 50 TAB at 17:40

## 2021-07-12 RX ADMIN — GABAPENTIN 800 MG: 100 CAPSULE ORAL at 17:40

## 2021-07-12 RX ADMIN — GABAPENTIN 800 MG: 100 CAPSULE ORAL at 21:05

## 2021-07-12 RX ADMIN — LISINOPRIL 20 MG: 20 TABLET ORAL at 08:47

## 2021-07-12 RX ADMIN — FLUTICASONE PROPIONATE 2 SPRAY: 50 SPRAY, METERED NASAL at 08:49

## 2021-07-12 RX ADMIN — CHLORDIAZEPOXIDE HYDROCHLORIDE 10 MG: 5 CAPSULE ORAL at 08:47

## 2021-07-12 RX ADMIN — BUSPIRONE HYDROCHLORIDE 15 MG: 5 TABLET ORAL at 08:46

## 2021-07-12 RX ADMIN — PYRIDOXINE HCL TAB 50 MG 50 MG: 50 TAB at 08:47

## 2021-07-12 RX ADMIN — CYANOCOBALAMIN TAB 500 MCG 1000 MCG: 500 TAB at 08:47

## 2021-07-12 RX ADMIN — Medication 10 ML: at 21:08

## 2021-07-12 RX ADMIN — ACETAMINOPHEN 1000 MG: 500 TABLET ORAL at 17:42

## 2021-07-12 RX ADMIN — IPRATROPIUM BROMIDE 1 SPRAY: 42 SPRAY NASAL at 08:50

## 2021-07-12 RX ADMIN — PANTOPRAZOLE SODIUM 40 MG: 40 TABLET, DELAYED RELEASE ORAL at 17:40

## 2021-07-12 RX ADMIN — METOPROLOL SUCCINATE 50 MG: 50 TABLET, EXTENDED RELEASE ORAL at 08:47

## 2021-07-12 RX ADMIN — DULOXETINE 120 MG: 30 CAPSULE, DELAYED RELEASE ORAL at 08:46

## 2021-07-12 RX ADMIN — CHLORDIAZEPOXIDE HYDROCHLORIDE 10 MG: 5 CAPSULE ORAL at 17:41

## 2021-07-12 RX ADMIN — RISPERIDONE 0.5 MG: 1 TABLET, FILM COATED ORAL at 17:41

## 2021-07-12 RX ADMIN — GABAPENTIN 800 MG: 100 CAPSULE ORAL at 08:47

## 2021-07-12 RX ADMIN — BUSPIRONE HYDROCHLORIDE 15 MG: 5 TABLET ORAL at 17:41

## 2021-07-12 RX ADMIN — ATORVASTATIN CALCIUM 40 MG: 40 TABLET, FILM COATED ORAL at 21:05

## 2021-07-12 RX ADMIN — GUAIFENESIN 1200 MG: 600 TABLET, EXTENDED RELEASE ORAL at 17:41

## 2021-07-12 RX ADMIN — MONTELUKAST 10 MG: 10 TABLET, FILM COATED ORAL at 21:04

## 2021-07-12 RX ADMIN — Medication 1 AMPULE: at 21:03

## 2021-07-12 RX ADMIN — BUSPIRONE HYDROCHLORIDE 15 MG: 5 TABLET ORAL at 21:05

## 2021-07-12 RX ADMIN — ARFORMOTEROL TARTRATE: 15 SOLUTION RESPIRATORY (INHALATION) at 21:11

## 2021-07-12 RX ADMIN — OXYCODONE 10 MG: 5 TABLET ORAL at 12:30

## 2021-07-12 RX ADMIN — CARBAMIDE PEROXIDE 6.5% 5 DROP: 6.5 LIQUID AURICULAR (OTIC) at 08:49

## 2021-07-12 RX ADMIN — FOLIC ACID 1 MG: 1 TABLET ORAL at 08:47

## 2021-07-12 RX ADMIN — DIAZEPAM 5 MG: 5 TABLET ORAL at 04:25

## 2021-07-12 RX ADMIN — PANTOPRAZOLE SODIUM 40 MG: 40 TABLET, DELAYED RELEASE ORAL at 08:47

## 2021-07-12 RX ADMIN — RISPERIDONE 0.5 MG: 1 TABLET, FILM COATED ORAL at 21:05

## 2021-07-12 RX ADMIN — OXYCODONE 10 MG: 5 TABLET ORAL at 04:15

## 2021-07-12 RX ADMIN — IPRATROPIUM BROMIDE 1 SPRAY: 42 SPRAY NASAL at 23:28

## 2021-07-12 RX ADMIN — ERGOCALCIFEROL 50000 UNITS: 1.25 CAPSULE ORAL at 01:18

## 2021-07-12 RX ADMIN — LEVETIRACETAM 1000 MG: 100 INJECTION, SOLUTION INTRAVENOUS at 08:56

## 2021-07-12 RX ADMIN — GABAPENTIN 800 MG: 100 CAPSULE ORAL at 12:30

## 2021-07-12 RX ADMIN — THIAMINE HCL TAB 100 MG 100 MG: 100 TAB at 08:47

## 2021-07-12 RX ADMIN — ACETAMINOPHEN 1000 MG: 500 TABLET ORAL at 08:47

## 2021-07-12 RX ADMIN — GUAIFENESIN 1200 MG: 600 TABLET, EXTENDED RELEASE ORAL at 08:47

## 2021-07-12 RX ADMIN — TRAZODONE HYDROCHLORIDE 100 MG: 100 TABLET ORAL at 21:07

## 2021-07-12 RX ADMIN — RISPERIDONE 0.5 MG: 1 TABLET, FILM COATED ORAL at 08:47

## 2021-07-12 RX ADMIN — FERROUS SULFATE TAB 325 MG (65 MG ELEMENTAL FE) 325 MG: 325 (65 FE) TAB at 08:47

## 2021-07-12 RX ADMIN — LEVETIRACETAM 1000 MG: 500 TABLET ORAL at 21:07

## 2021-07-12 NOTE — PROGRESS NOTES
Hospitalist Progress Note    NAME: Timothy Brownlee   :  1960   MRN:  [id number]       Assessment / Plan:  Cervical stenosis s/p anterior cervical disc fusion  -Postoperative care per neurosurgery. Cervical collar in place.  -Pain control     Hypertension uncontrolled  -Continue metoprolol 50 mg daily. Pain control should help with blood pressure control    Chronic alcoholism with concern for alcohol withdrawal  -Continue alcohol withdrawal protocol with Ativan. CIWA score is 2. Continue multivitamin  -Continue Librium taper and last dose today     Chronic respiratory failure with hypoxia on home oxygen  Emphysema  History of tobacco abuse  Abnormal CT of the chest  TANIA not on CPAP  CT of the chest showed   Suspected sarcoidosis  1. Bilateral parenchymal scarring and traction bronchiectasis without evidence  of honeycombing. Given the upper lobe predominance of findings and multiple  calcified lymph nodes, this may be due to sarcoidosis. 2. 9.3 mm left upper lobe pulmonary nodule with internal central calcification,  likely a granuloma in this patient with multiple calcified mediastinal lymph  nodes. This could be followed up with CT scan. 3. Emphysema. Continue with home inhalers with pharmacy substitution, continue with nebs  Continue with montelukast  -Will arrange out pt f/u with pulmonary   -pt informed that he has a follow up with pulmonologist at [facility] and follow ups with them. His pulmonary nodules at least 13years old and are stable according to him. New symptoms of shortness of breath, cough, headache and nausea on   -Patient was waiting for discharge over the weekend due to home health not being set up  -On morning of  he developed symptoms of shortness of breath, cough, phlegm, headache, nausea.   -CBC and CMP were normal.  Troponin normal.  Chest x-ray shows no definite infiltrate  -CT head shows small area of hypodensity in the medial left parietal cortex likely vascular lesion. Radiologist recommended MRI with and without contrast  -We will get MRI  -Check procalcitonin. He does not have any fever. Monitor for any worsening of symptoms. Incidental finding of cerebral vascular lesion  -Check MRI of the brain with and without contrast    Hypokalemia  -K is normal today      History of seizure disorder  gerd  -Continue Keppra    Depression  Anxiety  Continue with risperidone, trazodone  Continue BuSpar, Cymbalta  Will reach out to his primary psychiatrist about my concerns for polypharmacy    Discharge held today as home health could not be set up    ROLAN: 7/13    Code Status: Full  DVT Prophylaxis: Pt is ambulating, scd's while in bed.         Body mass index is 29.99 kg/m². Code status: Full  Prophylaxis: Scd's  Recommended Disposition: Home w/Family     Subjective:     Chief Complaint / Reason for Physician Visit  He reports not feeling better today. Reports having headache which is generalized, does not report as a throbbing type of pain. Reports shortness of breath along with some cough and whitish phlegm. Reports nausea but no vomiting. Does not report any abdominal pain          Review of Systems:  Symptom Y/N Comments  Symptom Y/N Comments   Fever/Chills    Chest Pain     Poor Appetite    Edema     Cough    Abdominal Pain     Sputum    Joint Pain     SOB/YOUNG    Pruritis/Rash     Nausea/vomit    Tolerating PT/OT     Diarrhea    Tolerating Diet     Constipation    Other       Could NOT obtain due to:      Objective:     VITALS:   Last 24hrs VS reviewed since prior progress note.  Most recent are:  Patient Vitals for the past 24 hrs:   Temp Pulse Resp BP SpO2   07/12/21 1203 98.3 °F (36.8 °C) (!) 119 20 109/67 95 %   07/12/21 0735 98.2 °F (36.8 °C) (!) 112 18 119/79 92 %   07/12/21 0327 98.5 °F (36.9 °C) 64 18 102/66 96 %   07/11/21 2133 97.8 °F (36.6 °C) (!) 109 16 114/75 94 %   07/11/21 2113     95 %   07/11/21 1537 97.7 °F (36.5 °C) (!) 103 16 110/72 96 %     No intake or output data in the 24 hours ending 07/12/21 7031     PHYSICAL EXAM:  General: no acute distress    EENT:  EOMI. Anicteric sclerae. MMM, cervical collar present  Resp:  CTA bilaterally, no wheezing or rales. No accessory muscle use  CV:  Regular  rhythm,  No edema  GI:  Soft, Non distended, Non tender.  +Bowel sounds  Neurologic:  Alert, awake and oriented x3  Psych:   Pleasant  Skin:  No rashes.   No jaundice    Reviewed most current lab test results and cultures  YES  Reviewed most current radiology test results   YES  Review and summation of old records today    NO  Reviewed patient's current orders and MAR    YES  PMH/SH reviewed - no change compared to H&P          Current Facility-Administered Medications:     chlordiazePOXIDE (LIBRIUM) capsule 10 mg, 10 mg, Oral, TID, Mora Dee MD    morphine injection 2 mg, 2 mg, IntraVENous, Q4H PRN, Mora Crum MD    metoprolol succinate (TOPROL-XL) XL tablet 50 mg, 50 mg, Oral, DAILY, Srinivas Dee MD, 50 mg at 07/12/21 0847    levETIRAcetam (KEPPRA) 1,000 mg in 0.9% sodium chloride 100 mL IVPB, 1,000 mg, IntraVENous, Q12H, Srinivas Dee MD, Last Rate: 400 mL/hr at 07/09/21 0859, 1,000 mg at 07/12/21 0856    busPIRone (BUSPAR) tablet 15 mg, 15 mg, Oral, TID, Prieto Brian MD, 15 mg at 07/12/21 0846    risperiDONE (RisperDAL) tablet 0.5 mg, 0.5 mg, Oral, TID, Jc CHI MD, 0.5 mg at 07/12/21 0847    traZODone (DESYREL) tablet 100 mg, 100 mg, Oral, QHS, Srinivas Dee MD, 100 mg at 07/11/21 2139    phenol throat spray (CHLORASEPTIC) 1 Spray, 1 Spray, Oral, PRN, Nicole Coughlin NP, 1 Spray at 07/07/21 1017    fluticasone propionate (FLONASE) 50 mcg/actuation nasal spray 2 Spray, 2 Spray, Both Nostrils, DAILY, Nicole Coughlin, NP, 2 Spray at 07/12/21 0849    ipratropium (ATROVENT) 42 mcg (0.06 %) nasal spray 1 Spray, 1 Spray, Both Nostrils, TID, Nicole Coughlin, SHARI, 1 Spray at 07/12/21 0850    LORazepam (ATIVAN) injection 2 mg, 2 mg, IntraVENous, Q1H PRN, Charlene Alvarado MD, 2 mg at 07/08/21 0602    LORazepam (ATIVAN) injection 4 mg, 4 mg, IntraVENous, Q1H PRN, Doris Franco MD    folic acid (FOLVITE) tablet 1 mg, 1 mg, Oral, DAILY, Srinivas Dee MD, 1 mg at 07/12/21 0847    thiamine mononitrate (B-1) tablet 100 mg, 100 mg, Oral, DAILY, Leopoldo Paras A, MD, 100 mg at 07/12/21 0847    arformoterol 15 mcg/budesonide 0.5 mg neb solution, , Nebulization, BID, Rowdy Simmons NP, Given at 07/11/21 2114    DULoxetine (CYMBALTA) capsule 120 mg, 120 mg, Oral, DAILY, Rowdy Simmons NP, 120 mg at 07/12/21 0846    montelukast (SINGULAIR) tablet 10 mg, 10 mg, Oral, QHS, Rowdy Simmons NP, 10 mg at 07/11/21 2139    atorvastatin (LIPITOR) tablet 40 mg, 40 mg, Oral, QHS, Rowdy Simmons NP, 40 mg at 07/11/21 2139    labetaloL (NORMODYNE;TRANDATE) injection 10 mg, 10 mg, IntraVENous, Q4H PRN, Charlene Alvarado MD, 10 mg at 07/08/21 1149    lisinopriL (PRINIVIL, ZESTRIL) tablet 20 mg, 20 mg, Oral, DAILY, Srinivas Dee MD, 20 mg at 07/12/21 0847    gabapentin (NEURONTIN) capsule 800 mg, 800 mg, Oral, QID, Leopoldo Paras A, MD, 800 mg at 07/12/21 1230    cyanocobalamin (VITAMIN B12) tablet 1,000 mcg, 1,000 mcg, Oral, DAILY, Leopoldo Paras A, MD, 1,000 mcg at 07/12/21 0847    carbamide peroxide (DEBROX) 6.5 % otic solution 5 Drop, 5 Drop, Both Ears, BID, Leopoldo Paras A, MD, 5 Drop at 07/12/21 0849    ergocalciferol capsule 50,000 Units, 50,000 Units, Oral, Q7D, Stephen Luna MD, 50,000 Units at 07/12/21 0118    ferrous sulfate tablet 325 mg, 1 Tablet, Oral, ACB, Leopoldo Paras A, MD, 325 mg at 07/12/21 0847    guaiFENesin ER (MUCINEX) tablet 1,200 mg, 1,200 mg, Oral, BID, Leopoldo Paras A, MD, 1,200 mg at 07/12/21 0847    pantoprazole (PROTONIX) tablet 40 mg, 40 mg, Oral, ACB&D, Leopoldo Paras A, MD, 40 mg at 07/12/21 0847    pyridoxine (vitamin B6) (VITAMIN B-6) tablet 50 mg, 50 mg, Oral, BID, Leopoldo Paras A, MD, 50 mg at 07/12/21 1121    ipratropium (ATROVENT) 0.02 % nebulizer solution 0.5 mg, 0.5 mg, Nebulization, Q6H PRN, Tonio Schultz MD    sodium chloride (NS) flush 5-40 mL, 5-40 mL, IntraVENous, Q8H, AyshaKen MD, 10 mL at 07/11/21 1405    sodium chloride (NS) flush 5-40 mL, 5-40 mL, IntraVENous, PRN, Tonio Schultz MD    acetaminophen (TYLENOL) tablet 1,000 mg, 1,000 mg, Oral, Q6H, [facility] Center[facility] MD, 1,000 mg at 07/12/21 0847    oxyCODONE IR (ROXICODONE) tablet 5 mg, 5 mg, Oral, Q3H PRN, Demetrius ARTHUR MD, 5 mg at 07/08/21 1825    oxyCODONE IR (ROXICODONE) tablet 10 mg, 10 mg, Oral, Q3H PRN, Demetrius ARTHUR MD, 10 mg at 07/12/21 1230    hydrOXYzine HCL (ATARAX) tablet 10 mg, 10 mg, Oral, Q8H PRN, Tonio Schultz MD    senna-docusate (PERICOLACE) 8.6-50 mg per tablet 1 Tablet, 1 Tablet, Oral, BID, Tonio Schultz MD, 1 Tablet at 07/11/21 1704    polyethylene glycol (MIRALAX) packet 17 g, 17 g, Oral, DAILY, [facility] Center[facility]    bisacodyL (DULCOLAX) suppository 10 mg, 10 mg, Rectal, DAILY PRN, Tonio Schultz MD    benzocaine-menthoL (CHLORASEPTIC MAX) lozenge 1 Lozenge, 1 Lozenge, Oral, PRN, Tonio Schultz MD, 1 Lozenge at 07/06/21 2203    diazePAM (VALIUM) tablet 5 mg, 5 mg, Oral, Q6H PRN, Demetrius ARTHUR MD, 5 mg at 07/12/21 0425    cyclobenzaprine (FLEXERIL) tablet 10 mg, 10 mg, Oral, BID PRN, Demetrius ARTHUR MD, 10 mg at 07/11/21 2140    albuterol-ipratropium (DUO-NEB) 2.5 MG-0.5 MG/3 ML, 3 mL, Nebulization, Q4H PRN, Tonio Schultz MD    alcohol 62% (NOZIN) nasal  1 Ampule, 1 Ampule, Topical, Q12H, Tonio Schultz MD, 1 Ampule at 07/11/21 5494    naloxone (NARCAN) injection 0.4 mg, 0.4 mg, IntraVENous, PRN, Krish Venegas PA-C  ________________________________________________________________________  Care Plan discussed with:    Comments   Patient y    Family      RN y    Care Manager     Consultant                        Multidiciplinary team rounds were held today with , nursing, pharmacist and clinical coordinator. Patient's plan of care was discussed; medications were reviewed and discharge planning was addressed. ________________________________________________________________________  Total NON critical care TIME:  35   Minutes    Total CRITICAL CARE TIME Spent:   Minutes non procedure based      Comments   >50% of visit spent in counseling and coordination of care     ________________________________________________________________________  Steve Bernal MD     Procedures: see electronic medical records for all procedures/Xrays and details which were not copied into this note but were reviewed prior to creation of Plan. LABS:  I reviewed today's most current labs and imaging studies.   Pertinent labs include:  Recent Labs     07/12/21  1140 07/10/21  0016   WBC 7.4 7.7   HGB 11.5* 11.2*   HCT 37.0 34.3*    199     Recent Labs     07/12/21  1140 07/10/21  0016    139   K 4.2 3.7    109*   CO2 32 25   GLU 99 101*   BUN 10 12   CREA 0.97 0.77   CA 9.7 9.2   ALB 3.3*  --    TBILI 0.3  --    ALT 43  --        Signed: Steve Bernal MD

## 2021-07-12 NOTE — ROUTINE PROCESS

## 2021-07-12 NOTE — PROGRESS NOTES
Ortho / Neurosurgery NP Note    POD# 5  s/p CERVICAL 3 TO CERVICAL 7 ANTERIOR CERVICAL DISCECTOMY AND FUSION   Pt seen with no visitor present. During my assessment Dr. Cheryl Huynh came in to visit as well. Pt assisted to EOB per request.   States he overall does not feel well today. C/o severe HA, SOB and abd pain. Also reports sore throat. Voice is hoarse. States he is unable to eat today but tolerated regular diet last night for dinner. Receiving oxycodone and valium for post-op pain management. Chloraseptic at bedside. VSS Afebrile. 2L NC while resting today due to SOB - O2 sats 95%  Wears 3L O2 with exertion at baseline   Tachycardia -  on assessment       Visit Vitals  /79   Pulse (!) 112   Temp 98.2 °F (36.8 °C)   Resp 18   Ht 5' 11\" (1.803 m)   Wt 97.5 kg (215 lb)   SpO2 92%   BMI 29.99 kg/m²       Voiding status: voiding   Output (mL)  Urine Voided: 650 ml (07/10/21 0618)  Stool: 1 ml (07/08/21 2208)  Last Bowel Movement Date: 07/12/21 (07/12/21 0735)  Unmeasurable Output  Urine Occurrence(s): 1 (07/10/21 1301)  Stool Occurrence(s): 1 (07/09/21 0929)      Labs    Lab Results   Component Value Date/Time    HGB 11.2 (L) 07/10/2021 12:16 AM      Lab Results   Component Value Date/Time    INR 1.0 07/06/2021 04:04 AM      Lab Results   Component Value Date/Time    Sodium 139 07/10/2021 12:16 AM    Potassium 3.7 07/10/2021 12:16 AM    Chloride 109 (H) 07/10/2021 12:16 AM    CO2 25 07/10/2021 12:16 AM    Glucose 101 (H) 07/10/2021 12:16 AM    BUN 12 07/10/2021 12:16 AM    Creatinine 0.77 07/10/2021 12:16 AM    Calcium 9.2 07/10/2021 12:16 AM     Recent Glucose Results:   No results found for: GLU, GLUPOC, GLUCPOC    Body mass index is 29.99 kg/m². : A BMI > 30 is classified as obesity and > 40 is classified as morbid obesity. Tight cough. Pt reports productive cough   Prineo dressing c.d.i  MADI drain removed 7/7, gauze c/d/i  Calves soft and supple;  No pain with passive stretch  Sensation and motor intact - C5-T1 intact, RUE 4+/5, LUE 4-/5  Denies pain or tingling  in BUE  SCDs for mechanical DVT proph while in bed     PLAN:  1) Neurovascular assessment q4 hours   2) PT/OT - Hard cervical collar. 3) Pain control - scheduled tylenol, prn oxycodone and valium   4) Medical management per Hospitalist - appreciate assistance. Discussed with Dr Li Mcfarland, patient is not medically ready for discharge today given symptoms. 5) ETOH abuse - CIWA started on 7/7 for symptoms of ETOH withdrawal. Last dose Ativan 7/8. No symptoms currently. Further work-up of symptoms today. 6) Discharge planning - lives alone with CNA who helps with IADLs, relys on local [employer] friends for additional support, uses medicaid transportation. He cleared therapy on POD1 and would benefit from [facility] to continue PT/OT. Discharge pending medical stability.       Jeremias Toro, NP

## 2021-07-12 NOTE — PROGRESS NOTES
He is not feeling better today  Has headache, sob, cough and yellow phlegm  No fever  Will order work up  Get cxr, cbc, cmp, troponin. Get CT head  Hold off on discharge.

## 2021-07-12 NOTE — PROGRESS NOTES
Spiritual Care Assessment/Progress Note  [facility]      NAME: Keily Spence      MRN: [id number]  AGE: 62 y.o. SEX: male  Mosque Affiliation: Religious   Language: English     7/12/2021     Total Time (in minutes): 5     Spiritual Assessment begun in [facility] through conversation with:         []Patient        [] Family    [] Friend(s)        Reason for Consult: Initial/Spiritual assessment, patient floor     Spiritual beliefs: (Please include comment if needed)     [] Identifies with a aster tradition:         [] Supported by a aster community:            [] Claims no spiritual orientation:           [] Seeking spiritual identity:                [] Adheres to an individual form of spirituality:           [x] Not able to assess:                           Identified resources for coping:      [] Prayer                               [] Music                  [] Guided Imagery     [] Family/friends                 [] Pet visits     [] Devotional reading                         [x] Unknown     [] Other:                                               Interventions offered during this visit: (See comments for more details)    Patient Interventions: Initial visit           Plan of Care:     [] Support spiritual and/or cultural needs    [] Support AMD and/or advance care planning process      [] Support grieving process   [] Coordinate Rites and/or Rituals    [] Coordination with community clergy   [] No spiritual needs identified at this time   [] Detailed Plan of Care below (See Comments)  [] Make referral to Music Therapy  [] Make referral to Pet Therapy     [] Make referral to Addiction services  [] Make referral to [facility]  [] Make referral to Spiritual Care Partner  [] No future visits requested        [x] Follow up upon further referrals     Comments:     Initial Spiritual Care Assessment attempt for Mr. Nupur Buckley in 3223. Performed chart review before the visit.   Upon arrival, patient was appeared sleeping.  respected patient's privacy. Contact  if emotional/spiritual needs arise.      [id number] Arlene Sheffield., M.S., Th.M.  Spiritual Care Provider   Paging Service [phone])

## 2021-07-12 NOTE — PROGRESS NOTES
0730-Report taken from Kayy Zuluaga 1942. Patient has discharge order however awaiting home health to be set up.     1230-Radiologist called to alert that he would be recommending an MRI of the brain based on findings of CT. Dr. Dodie Prabhakar made aware, who stated he would await report and order as recommended.

## 2021-07-12 NOTE — PROGRESS NOTES
Bedside shift change report given to Deandre Rincon RN (oncoming nurse) by Shahzad Ramos RN (offgoing nurse). Report included the following information SBAR, Kardex, Procedure Summary, Intake/Output and MAR.

## 2021-07-12 NOTE — PROGRESS NOTES
Comprehensive Nutrition Assessment    Type and Reason for Visit: Initial, RD nutrition re-screen/LOS    Nutrition Recommendations/Plan:   Continue regular diet  Add daily Ensure enlive to promote wound healing  Continue daily supplementation of thiamine d/t alcoholism  Please document % meals and supplements consumed in flowsheet I/O's under intake     Nutrition Assessment:      Chart reviewed for LOS. Pt noted for cervical stenosis, s/p fusion, suspected sarcoidosis, emphysema, chronic respiratory failure, chronic alcoholism, HTN. Pt developed new SOB, cough, head ache and nausea today. New cerebrovascular lesion found and MRI is pending. MST negative for decreased appetite or weight loss PTA. Pt reports feeling fine and eating well before today. He ate half his hamburger at lunch. Will add daily supplement post op and continue monitoring. Patient Vitals for the past 168 hrs:   % Diet Eaten   07/10/21 1301 76 - 100%   07/07/21 1431 76 - 100%     Wt Readings from Last 5 Encounters:   07/05/21 97.5 kg (215 lb)   06/28/21 94.3 kg (207 lb 14.3 oz)   05/25/21 94.3 kg (208 lb)   05/10/21 97.6 kg (215 lb 3.2 oz)   08/25/20 98.6 kg (217 lb 6 oz)   ]    Estimated Daily Nutrient Needs:  Energy (kcal): 2169 kcal (BMR 1807 x 1. 2AF); Weight Used for Energy Requirements: Current  Protein (g): 78-98g (0.8-1g/kg); Weight Used for Protein Requirements: Current  Fluid (ml/day): 2100mL; Method Used for Fluid Requirements: 1 ml/kcal      Nutrition Related Findings:  Labs: reviewed. Meds: B12, ergocalciferol, ferrous sulfate, folvite, oxycodone, B6, thiamine. Edema: 1+ generalized. BM 7/12 watery. Wounds:    Surgical incision       Current Nutrition Therapies:  ADULT DIET Regular    Anthropometric Measures:  · Height:  5' 11\" (180.3 cm)  · Current Body Wt:  93.9 kg (207 lb)   · Ideal Body Wt:  172 lbs:  120.3 %   · BMI Category: Overweight (BMI 25.0-29. 9)       Nutrition Diagnosis:   · Increased nutrient needs related to (wound healing) as evidenced by  (post op spinal fusion)      Nutrition Interventions:   Food and/or Nutrient Delivery: Continue current diet, Start oral nutrition supplement  Nutrition Education and Counseling: No recommendations at this time  Coordination of Nutrition Care: Continue to monitor while inpatient    Goals:  PO intake >50% meals and daily ONS next 2-4 days       Nutrition Monitoring and Evaluation:   Behavioral-Environmental Outcomes: None identified  Food/Nutrient Intake Outcomes: Food and nutrient intake, Supplement intake  Physical Signs/Symptoms Outcomes: Biochemical data, Skin, Weight, GI status, Fluid status or edema    Discharge Planning:    Continue current diet     Electronically signed by Viki Velasquez RD on 7/12/2021 at 3:55 PM    Contact: [phone]

## 2021-07-12 NOTE — PROGRESS NOTES
End of Shift Note    Bedside shift change report given to  (oncoming nurse) by Dia Minor (offgoing nurse). Report included the following information SBAR, Kardex, Procedure Summary, Intake/Output, MAR and Recent Results    Shift worked:  7a-7p     Shift summary and any significant changes:     pt /co not feeling well, d/c delayed. MRI pending, documentation complete. Concerns for physician to address:  MRI, d/c plan     Zone phone for oncoming shift:          Activity:  Activity Level: Up with Assistance  Number times ambulated in hallways past shift: 0  Number of times OOB to chair past shift: 2    Cardiac:   Cardiac Monitoring: No      Cardiac Rhythm: Sinus Tach    Access:   Current line(s): PIV     Genitourinary:   Urinary status: voiding    Respiratory:   O2 Device: None (Room air)  Chronic home O2 use?: YES  Incentive spirometer at bedside: YES     GI:  Last Bowel Movement Date: 07/12/21  Current diet:  ADULT DIET Regular  ADULT ORAL NUTRITION SUPPLEMENT Dinner; Standard High Calorie/High Protein  Passing flatus: YES  Tolerating current diet: YES       Pain Management:   Patient states pain is manageable on current regimen: YES    Skin:  Bryon Score: 19  Interventions: increase time out of bed    Patient Safety:  Fall Score:  Total Score: 2  Interventions: assistive device (walker, cane, etc)  High Fall Risk: Yes    Length of Stay:  Expected LOS: 2d 12h  Actual LOS: [facility]

## 2021-07-13 ENCOUNTER — APPOINTMENT (OUTPATIENT)
Dept: MRI IMAGING | Age: 61
DRG: 321 | End: 2021-07-13
Attending: INTERNAL MEDICINE
Payer: MEDICAID

## 2021-07-13 LAB
ANION GAP SERPL CALC-SCNC: 5 MMOL/L (ref 5–15)
BUN SERPL-MCNC: 12 MG/DL (ref 6–20)
BUN/CREAT SERPL: 16 (ref 12–20)
CALCIUM SERPL-MCNC: 9.6 MG/DL (ref 8.5–10.1)
CHLORIDE SERPL-SCNC: 105 MMOL/L (ref 97–108)
CO2 SERPL-SCNC: 26 MMOL/L (ref 21–32)
CREAT SERPL-MCNC: 0.77 MG/DL (ref 0.7–1.3)
ERYTHROCYTE [DISTWIDTH] IN BLOOD BY AUTOMATED COUNT: 13.6 % (ref 11.5–14.5)
GLUCOSE SERPL-MCNC: 115 MG/DL (ref 65–100)
HCT VFR BLD AUTO: 35 % (ref 36.6–50.3)
HGB BLD-MCNC: 11 G/DL (ref 12.1–17)
MCH RBC QN AUTO: 26.9 PG (ref 26–34)
MCHC RBC AUTO-ENTMCNC: 31.4 G/DL (ref 30–36.5)
MCV RBC AUTO: 85.6 FL (ref 80–99)
NRBC # BLD: 0 K/UL (ref 0–0.01)
NRBC BLD-RTO: 0 PER 100 WBC
PLATELET # BLD AUTO: 267 K/UL (ref 150–400)
PMV BLD AUTO: 9.1 FL (ref 8.9–12.9)
POTASSIUM SERPL-SCNC: 4.3 MMOL/L (ref 3.5–5.1)
PROCALCITONIN SERPL-MCNC: <0.05 NG/ML
RBC # BLD AUTO: 4.09 M/UL (ref 4.1–5.7)
SODIUM SERPL-SCNC: 136 MMOL/L (ref 136–145)
TROPONIN I SERPL-MCNC: <0.05 NG/ML
WBC # BLD AUTO: 11.3 K/UL (ref 4.1–11.1)

## 2021-07-13 PROCEDURE — 36415 COLL VENOUS BLD VENIPUNCTURE: CPT

## 2021-07-13 PROCEDURE — 94640 AIRWAY INHALATION TREATMENT: CPT

## 2021-07-13 PROCEDURE — 80048 BASIC METABOLIC PNL TOTAL CA: CPT

## 2021-07-13 PROCEDURE — 74011250637 HC RX REV CODE- 250/637: Performed by: NURSE PRACTITIONER

## 2021-07-13 PROCEDURE — 74011636320 HC RX REV CODE- 636/320: Performed by: INTERNAL MEDICINE

## 2021-07-13 PROCEDURE — 74011250637 HC RX REV CODE- 250/637: Performed by: ORTHOPAEDIC SURGERY

## 2021-07-13 PROCEDURE — 84145 PROCALCITONIN (PCT): CPT

## 2021-07-13 PROCEDURE — 70553 MRI BRAIN STEM W/O & W/DYE: CPT

## 2021-07-13 PROCEDURE — A9576 INJ PROHANCE MULTIPACK: HCPCS | Performed by: INTERNAL MEDICINE

## 2021-07-13 PROCEDURE — 77010033678 HC OXYGEN DAILY

## 2021-07-13 PROCEDURE — 74011250637 HC RX REV CODE- 250/637: Performed by: INTERNAL MEDICINE

## 2021-07-13 PROCEDURE — 94760 N-INVAS EAR/PLS OXIMETRY 1: CPT

## 2021-07-13 PROCEDURE — 65270000029 HC RM PRIVATE

## 2021-07-13 PROCEDURE — 84484 ASSAY OF TROPONIN QUANT: CPT

## 2021-07-13 PROCEDURE — 74011000250 HC RX REV CODE- 250: Performed by: NURSE PRACTITIONER

## 2021-07-13 PROCEDURE — 85027 COMPLETE CBC AUTOMATED: CPT

## 2021-07-13 RX ADMIN — GABAPENTIN 800 MG: 100 CAPSULE ORAL at 18:13

## 2021-07-13 RX ADMIN — GABAPENTIN 800 MG: 100 CAPSULE ORAL at 09:38

## 2021-07-13 RX ADMIN — PYRIDOXINE HCL TAB 50 MG 50 MG: 50 TAB at 09:38

## 2021-07-13 RX ADMIN — METOPROLOL SUCCINATE 50 MG: 50 TABLET, EXTENDED RELEASE ORAL at 09:38

## 2021-07-13 RX ADMIN — BUSPIRONE HYDROCHLORIDE 15 MG: 5 TABLET ORAL at 09:38

## 2021-07-13 RX ADMIN — RISPERIDONE 0.5 MG: 1 TABLET, FILM COATED ORAL at 09:39

## 2021-07-13 RX ADMIN — FOLIC ACID 1 MG: 1 TABLET ORAL at 09:39

## 2021-07-13 RX ADMIN — ARFORMOTEROL TARTRATE: 15 SOLUTION RESPIRATORY (INHALATION) at 19:44

## 2021-07-13 RX ADMIN — Medication 5 ML: at 06:00

## 2021-07-13 RX ADMIN — ACETAMINOPHEN 1000 MG: 500 TABLET ORAL at 09:41

## 2021-07-13 RX ADMIN — LEVETIRACETAM 1000 MG: 500 TABLET ORAL at 09:38

## 2021-07-13 RX ADMIN — ATORVASTATIN CALCIUM 40 MG: 40 TABLET, FILM COATED ORAL at 21:52

## 2021-07-13 RX ADMIN — CARBAMIDE PEROXIDE 6.5% 5 DROP: 6.5 LIQUID AURICULAR (OTIC) at 09:44

## 2021-07-13 RX ADMIN — Medication 1 AMPULE: at 09:42

## 2021-07-13 RX ADMIN — THIAMINE HCL TAB 100 MG 100 MG: 100 TAB at 09:38

## 2021-07-13 RX ADMIN — LISINOPRIL 20 MG: 20 TABLET ORAL at 09:39

## 2021-07-13 RX ADMIN — GABAPENTIN 800 MG: 100 CAPSULE ORAL at 21:51

## 2021-07-13 RX ADMIN — FERROUS SULFATE TAB 325 MG (65 MG ELEMENTAL FE) 325 MG: 325 (65 FE) TAB at 09:38

## 2021-07-13 RX ADMIN — FLUTICASONE PROPIONATE 2 SPRAY: 50 SPRAY, METERED NASAL at 09:44

## 2021-07-13 RX ADMIN — IPRATROPIUM BROMIDE 1 SPRAY: 42 SPRAY NASAL at 09:43

## 2021-07-13 RX ADMIN — GABAPENTIN 800 MG: 100 CAPSULE ORAL at 13:27

## 2021-07-13 RX ADMIN — BUSPIRONE HYDROCHLORIDE 15 MG: 5 TABLET ORAL at 21:51

## 2021-07-13 RX ADMIN — GUAIFENESIN 1200 MG: 600 TABLET, EXTENDED RELEASE ORAL at 09:38

## 2021-07-13 RX ADMIN — PYRIDOXINE HCL TAB 50 MG 50 MG: 50 TAB at 18:13

## 2021-07-13 RX ADMIN — Medication 1 AMPULE: at 21:00

## 2021-07-13 RX ADMIN — RISPERIDONE 0.5 MG: 1 TABLET, FILM COATED ORAL at 22:00

## 2021-07-13 RX ADMIN — OXYCODONE 10 MG: 5 TABLET ORAL at 21:53

## 2021-07-13 RX ADMIN — LEVETIRACETAM 1000 MG: 500 TABLET ORAL at 22:13

## 2021-07-13 RX ADMIN — DULOXETINE 120 MG: 30 CAPSULE, DELAYED RELEASE ORAL at 09:38

## 2021-07-13 RX ADMIN — OXYCODONE 10 MG: 5 TABLET ORAL at 05:23

## 2021-07-13 RX ADMIN — CYANOCOBALAMIN TAB 500 MCG 1000 MCG: 500 TAB at 09:38

## 2021-07-13 RX ADMIN — Medication 5 ML: at 22:00

## 2021-07-13 RX ADMIN — PANTOPRAZOLE SODIUM 40 MG: 40 TABLET, DELAYED RELEASE ORAL at 09:38

## 2021-07-13 RX ADMIN — GADOTERIDOL 20 ML: 279.3 INJECTION, SOLUTION INTRAVENOUS at 17:09

## 2021-07-13 RX ADMIN — IPRATROPIUM BROMIDE 1 SPRAY: 42 SPRAY NASAL at 21:53

## 2021-07-13 RX ADMIN — GUAIFENESIN 1200 MG: 600 TABLET, EXTENDED RELEASE ORAL at 18:13

## 2021-07-13 RX ADMIN — ACETAMINOPHEN 1000 MG: 500 TABLET ORAL at 18:13

## 2021-07-13 RX ADMIN — PANTOPRAZOLE SODIUM 40 MG: 40 TABLET, DELAYED RELEASE ORAL at 18:13

## 2021-07-13 RX ADMIN — MONTELUKAST 10 MG: 10 TABLET, FILM COATED ORAL at 21:52

## 2021-07-13 RX ADMIN — TRAZODONE HYDROCHLORIDE 100 MG: 100 TABLET ORAL at 21:52

## 2021-07-13 RX ADMIN — ARFORMOTEROL TARTRATE: 15 SOLUTION RESPIRATORY (INHALATION) at 08:15

## 2021-07-13 NOTE — PROGRESS NOTES
Hospitalist Progress Note    NAME: Jannis Hammans   :  1960   MRN:  [id number]       Assessment / Plan:  Cervical stenosis s/p anterior cervical disc fusion  Postoperative care per neurosurgery. Cervical collar in place. Patient claiming that he is still in pain. Patient was seen tachycardic but he does not look to be in pain. Hypertension continue current medications. Chronic alcoholism with concern for alcohol withdrawal  Continue alcohol withdrawal protocol with Ativan. We will continue to follow-up with CRUZ scoring with the nursing. Continue the multivitamins for now. Librium was tapered down and stopped yesterday. If tachycardia has something to do with withdrawal may need to put Librium back. Chronic respiratory failure with hypoxia on home oxygen  Emphysema  History of tobacco abuse  Abnormal CT of the chest  TANIA not on CPAP  CT of the chest showed   Suspected sarcoidosis  1. Bilateral parenchymal scarring and traction bronchiectasis without evidence  of honeycombing. Given the upper lobe predominance of findings and multiple  calcified lymph nodes, this may be due to sarcoidosis. 2. 9.3 mm left upper lobe pulmonary nodule with internal central calcification,  likely a granuloma in this patient with multiple calcified mediastinal lymph  nodes. This could be followed up with CT scan. 3. Emphysema. Continue with home inhalers with pharmacy substitution, continue with nebs  Continue with montelukast  -Will arrange out pt f/u with pulmonary   -pt informed that he has a follow up with pulmonologist at [facility] and follow ups with them. His pulmonary nodules at least 13years old and are stable according to him. Patient had shortness of breath and cough yesterday. But completely recovered today. The chest x-ray was negative. Patient also had headache and CT scan was done.   Incidental finding of cerebral vascular lesion  Check MRI of the brain with and without contrast  Hypokalemia  Repleted  History of seizure disorder  gerd  Continue Keppra  Depression  Anxiety  Continue with risperidone, trazodone  Continue BuSpar, Cymbalta  Will reach out to his primary psychiatrist about my concerns for polypharmacy  Expected  the discharge date is tomorrow. Code Status: Full  DVT Prophylaxis: Pt is ambulating, scd's while in bed. Body mass index is 29.99 kg/m². Subjective:     Chief Complaint / Reason for Physician Visit  Patient said that he does not have the cough and shortness of breath anymore. No headache as well. Curious why MRI was ordered. But still claiming he has the neck pain. Review of Systems:  Symptom Y/N Comments  Symptom Y/N Comments   Fever/Chills n   Chest Pain n    Poor Appetite    Edema     Cough    Abdominal Pain n    Sputum    Joint Pain     SOB/YOUNG n   Pruritis/Rash     Nausea/vomit    Tolerating PT/OT     Diarrhea    Tolerating Diet     Constipation    Other       Could NOT obtain due to:      Objective:     VITALS:   Last 24hrs VS reviewed since prior progress note. Most recent are:  Patient Vitals for the past 24 hrs:   Temp Pulse Resp BP SpO2   07/13/21 0755 98 °F (36.7 °C) (!) 120 20 113/69 91 %   07/13/21 0408 99.3 °F (37.4 °C) (!) 120 20 118/64 99 %   07/12/21 2333 98.1 °F (36.7 °C) (!) 103 20 130/74 100 %   07/12/21 2109     97 %   07/12/21 2052 98.1 °F (36.7 °C) 63 20 104/68 96 %   07/12/21 1638 98.3 °F (36.8 °C) (!) 109 20 114/73 93 %   07/12/21 1203 98.3 °F (36.8 °C) (!) 119 20 109/67 95 %       Intake/Output Summary (Last 24 hours) at 7/13/2021 0815  Last data filed at 7/13/2021 0252  Gross per 24 hour   Intake    Output 300 ml   Net -300 ml        PHYSICAL EXAM:  General: no acute distress    EENT:  EOMI. Anicteric sclerae. MMM, cervical collar present  Resp:  CTA bilaterally, no wheezing or rales.   No accessory muscle use  CV:  Regular  rhythm,  No edema  GI:  Soft, Non distended, Non tender.  +Bowel sounds  Neurologic:  Alert, awake and oriented x3  Psych:   Pleasant  Skin:  No rashes.   No jaundice      Reviewed most current lab test results and cultures  YES  Reviewed most current radiology test results   YES  Review and summation of old records today    NO  Reviewed patient's current orders and MAR    YES  PMH/SH reviewed - no change compared to H&P          Current Facility-Administered Medications:     levETIRAcetam (KEPPRA) tablet 1,000 mg, 1,000 mg, Oral, BID, Srinivas Dee MD, 1,000 mg at 07/12/21 2107    morphine injection 2 mg, 2 mg, IntraVENous, Q4H PRN, Dandre Nunez MD    metoprolol succinate (TOPROL-XL) XL tablet 50 mg, 50 mg, Oral, DAILY, Srinivas Dee MD, 50 mg at 07/12/21 0847    busPIRone (BUSPAR) tablet 15 mg, 15 mg, Oral, TID, Srinivas Nunez MD, 15 mg at 07/12/21 2105    risperiDONE (RisperDAL) tablet 0.5 mg, 0.5 mg, Oral, TID, Srinivas Dee MD, 0.5 mg at 07/12/21 2105    traZODone (DESYREL) tablet 100 mg, 100 mg, Oral, QHS, Srinivas Dee MD, 100 mg at 07/12/21 2107    phenol throat spray (CHLORASEPTIC) 1 Spray, 1 Spray, Oral, PRN, Kathie Short NP, 1 Spray at 07/07/21 1017    fluticasone propionate (FLONASE) 50 mcg/actuation nasal spray 2 Spray, 2 Spray, Both Nostrils, DAILY, Kathie Short NP, 2 Spray at 07/12/21 0849    ipratropium (ATROVENT) 42 mcg (0.06 %) nasal spray 1 Spray, 1 Spray, Both Nostrils, TID, Kathie Short NP, 1 Spray at 07/12/21 2328    LORazepam (ATIVAN) injection 2 mg, 2 mg, IntraVENous, Q1H PRN, Neftaly Crain MD, 2 mg at 07/08/21 0602    LORazepam (ATIVAN) injection 4 mg, 4 mg, IntraVENous, Q1H PRN, Dandre Nunez MD    folic acid (FOLVITE) tablet 1 mg, 1 mg, Oral, DAILY, Srinivas Dee MD, 1 mg at 07/12/21 0847    thiamine mononitrate (B-1) tablet 100 mg, 100 mg, Oral, DAILY, Margaux ARTHUR MD, 100 mg at 07/12/21 0847    arformoterol 15 mcg/budesonide 0.5 mg neb solution, , Nebulization, BID, Kathie Short NP, Given at 07/12/21 2111    DULoxetine (CYMBALTA) capsule 120 mg, 120 mg, Oral, DAILY, Claude Belfast, NP, 120 mg at 07/12/21 0846    montelukast (SINGULAIR) tablet 10 mg, 10 mg, Oral, QHS, Claude Belfast, NP, 10 mg at 07/12/21 2104    atorvastatin (LIPITOR) tablet 40 mg, 40 mg, Oral, QHS, Claude Belfast, NP, 40 mg at 07/12/21 2105    labetaloL (NORMODYNE;TRANDATE) injection 10 mg, 10 mg, IntraVENous, Q4H PRN, Asif Guerrero MD, 10 mg at 07/08/21 1149    lisinopriL (PRINIVIL, ZESTRIL) tablet 20 mg, 20 mg, Oral, DAILY, Carine CHI MD, 20 mg at 07/12/21 0847    gabapentin (NEURONTIN) capsule 800 mg, 800 mg, Oral, QID, Noreen ARTHUR MD, 800 mg at 07/12/21 2105    cyanocobalamin (VITAMIN B12) tablet 1,000 mcg, 1,000 mcg, Oral, DAILY, Noreen ARTHUR MD, 1,000 mcg at 07/12/21 0847    carbamide peroxide (DEBROX) 6.5 % otic solution 5 Drop, 5 Drop, Both Ears, BID, Yaritza Goodwin MD, 5 Drop at 07/12/21 0849    ergocalciferol capsule 50,000 Units, 50,000 Units, Oral, Q7D, Yaritza Goodwin MD, 50,000 Units at 07/12/21 0118    ferrous sulfate tablet 325 mg, 1 Tablet, Oral, ACB, Noreen ARTHUR MD, 325 mg at 07/12/21 0847    guaiFENesin ER (MUCINEX) tablet 1,200 mg, 1,200 mg, Oral, BID, Noreen ARTHUR MD, 1,200 mg at 07/12/21 1741    pantoprazole (PROTONIX) tablet 40 mg, 40 mg, Oral, ACB&D, Noreen ARTHUR MD, 40 mg at 07/12/21 1740    pyridoxine (vitamin B6) (VITAMIN B-6) tablet 50 mg, 50 mg, Oral, BID, Noreen ARTHUR MD, 50 mg at 07/12/21 1740    ipratropium (ATROVENT) 0.02 % nebulizer solution 0.5 mg, 0.5 mg, Nebulization, Q6H PRN, Yaritza Goodwin MD    sodium chloride (NS) flush 5-40 mL, 5-40 mL, IntraVENous, Q8H, AyshaKen MD, 5 mL at 07/13/21 0600    sodium chloride (NS) flush 5-40 mL, 5-40 mL, IntraVENous, PRN, Yaritza Goodwin MD    acetaminophen (TYLENOL) tablet 1,000 mg, 1,000 mg, Oral, Q6H, Leafy GivensKen MD, 1,000 mg at 07/12/21 1742    oxyCODONE IR (ROXICODONE) tablet 5 mg, 5 mg, Oral, Q3H PRN, Yana Medrano MD, 5 mg at 07/08/21 1825    oxyCODONE IR (ROXICODONE) tablet 10 mg, 10 mg, Oral, Q3H PRN, Yana Medrano MD, 10 mg at 07/13/21 0523    hydrOXYzine HCL (ATARAX) tablet 10 mg, 10 mg, Oral, Q8H PRN, Yana Medrano MD    senna-docusate (PERICOLACE) 8.6-50 mg per tablet 1 Tablet, 1 Tablet, Oral, BID, Yana Medrano MD, 1 Tablet at 07/11/21 1704    polyethylene glycol (MIRALAX) packet 17 g, 17 g, Oral, DAILY, Omaira Quintana MD    bisacodyL (DULCOLAX) suppository 10 mg, 10 mg, Rectal, DAILY PRN, Yana Medrano MD    benzocaine-menthoL (CHLORASEPTIC MAX) lozenge 1 Lozenge, 1 Lozenge, Oral, PRN, Yana Medrano MD, 1 Lozenge at 07/06/21 2203    diazePAM (VALIUM) tablet 5 mg, 5 mg, Oral, Q6H PRN, Blake ARTHUR MD, 5 mg at 07/12/21 0425    cyclobenzaprine (FLEXERIL) tablet 10 mg, 10 mg, Oral, BID PRN, Yana Medrano MD, 10 mg at 07/11/21 2149    albuterol-ipratropium (DUO-NEB) 2.5 MG-0.5 MG/3 ML, 3 mL, Nebulization, Q4H PRN, Yana Medrano MD    alcohol 62% (NOZIN) nasal  1 Ampule, 1 Ampule, Topical, Q12H, Yana Medrano MD, 1 Ampule at 07/12/21 2103    naloxone [facility]) injection 0.4 mg, 0.4 mg, IntraVENous, PRN, Letty Manzano PA-C  ________________________________________________________________________  Care Plan discussed with:    Comments   Patient y    Family      RN y    Care Manager     Consultant                        Multidiciplinary team rounds were held today with , nursing, pharmacist and clinical coordinator. Patient's plan of care was discussed; medications were reviewed and discharge planning was addressed.      ________________________________________________________________________  Total NON critical care TIME:  35   Minutes    Total CRITICAL CARE TIME Spent:   Minutes non procedure based      Comments   >50% of visit spent in counseling and coordination of care     ________________________________________________________________________  Earl Mendoza Jazmín Yoon MD     Procedures: see electronic medical records for all procedures/Xrays and details which were not copied into this note but were reviewed prior to creation of Plan. LABS:  I reviewed today's most current labs and imaging studies. Pertinent labs include:  Recent Labs     07/13/21 0527 07/12/21  1140   WBC 11.3* 7.4   HGB 11.0* 11.5*   HCT 35.0* 37.0    285     Recent Labs     07/13/21  0527 07/12/21  1140    140   K 4.3 4.2    106   CO2 26 32   * 99   BUN 12 10   CREA 0.77 0.97   CA 9.6 9.7   ALB  --  3.3*   TBILI  --  0.3   ALT  --  43       Signed:  Dai Garza MD

## 2021-07-13 NOTE — PROGRESS NOTES
0776-Report taken from [facility]. Patient in bed resting. MRI pending, documentation complete with esignatures in kardex.

## 2021-07-13 NOTE — PROGRESS NOTES
Transition of Care Plan:     RUR: 16%  Disposition: HH: [facility]   Follow up appointments: PCP  DME needed: DME not recommended for D/C   [facility] at Discharge: [facility] to [facility]:  Pt to D/C home w/keys    IM Medicare letter: N/A  Caregiver Contact: Nallely Duncan [phone]  Discharge Caregiver contacted prior to discharge? To be contacted upon D/C       [facility] has accepted Pt for [facility]. CM will continue to follow fo dc planning needs.              Raffi Tirado, CM  [facility]

## 2021-07-13 NOTE — PROGRESS NOTES
End of Shift Note    Bedside shift change report given to [facility] (oncoming nurse) by Long Hammer (offgoing nurse). Report included the following information SBAR, Kardex, Procedure Summary, Intake/Output, MAR and Recent Results    Shift worked:  7a-7p     Shift summary and any significant changes:     MRI done, patient c/o headache and congestion. On 2 L PRN which is baseline for him     Concerns for physician to address:  d/c plan, congestion     Zone phone for oncoming shift:          Activity:  Activity Level: Up with Assistance  Number times ambulated in hallways past shift: 0  Number of times OOB to chair past shift: 1    Cardiac:   Cardiac Monitoring: No      Cardiac Rhythm: Sinus Tach    Access:   Current line(s): PIV     Genitourinary:   Urinary status: voiding    Respiratory:   O2 Device: None (Room air)  Chronic home O2 use?: YES  Incentive spirometer at bedside: NO     GI:  Last Bowel Movement Date: 07/12/21  Current diet:  ADULT DIET Regular  ADULT ORAL NUTRITION SUPPLEMENT Dinner; Standard High Calorie/High Protein  Passing flatus: YES  Tolerating current diet: YES       Pain Management:   Patient states pain is manageable on current regimen: YES    Skin:  Bryon Score: 18  Interventions: increase time out of bed    Patient Safety:  Fall Score:  Total Score: 2  Interventions: assistive device (walker, cane, etc)  High Fall Risk: Yes    Length of Stay:  Expected LOS: 2d 12h  Actual LOS: [facility]

## 2021-07-14 VITALS
BODY MASS INDEX: 30.1 KG/M2 | SYSTOLIC BLOOD PRESSURE: 100 MMHG | DIASTOLIC BLOOD PRESSURE: 64 MMHG | RESPIRATION RATE: 20 BRPM | OXYGEN SATURATION: 93 % | TEMPERATURE: 97.6 F | HEIGHT: 71 IN | WEIGHT: 215 LBS | HEART RATE: 107 BPM

## 2021-07-14 PROCEDURE — 94640 AIRWAY INHALATION TREATMENT: CPT

## 2021-07-14 PROCEDURE — 74011000250 HC RX REV CODE- 250: Performed by: NURSE PRACTITIONER

## 2021-07-14 PROCEDURE — 74011250637 HC RX REV CODE- 250/637: Performed by: INTERNAL MEDICINE

## 2021-07-14 PROCEDURE — 74011250637 HC RX REV CODE- 250/637: Performed by: ORTHOPAEDIC SURGERY

## 2021-07-14 PROCEDURE — 2709999900 HC NON-CHARGEABLE SUPPLY

## 2021-07-14 PROCEDURE — 94760 N-INVAS EAR/PLS OXIMETRY 1: CPT

## 2021-07-14 PROCEDURE — 74011250637 HC RX REV CODE- 250/637: Performed by: NURSE PRACTITIONER

## 2021-07-14 PROCEDURE — 77010033678 HC OXYGEN DAILY

## 2021-07-14 RX ORDER — OXYCODONE HYDROCHLORIDE 5 MG/1
5 TABLET ORAL
Qty: 20 TABLET | Refills: 0 | Status: SHIPPED | OUTPATIENT
Start: 2021-07-14 | End: 2021-07-21

## 2021-07-14 RX ORDER — ASPIRIN 325 MG/1
100 TABLET, FILM COATED ORAL DAILY
Qty: 30 TABLET | Refills: 0 | Status: SHIPPED | OUTPATIENT
Start: 2021-07-15

## 2021-07-14 RX ADMIN — BUSPIRONE HYDROCHLORIDE 15 MG: 5 TABLET ORAL at 08:43

## 2021-07-14 RX ADMIN — OXYCODONE 10 MG: 5 TABLET ORAL at 11:31

## 2021-07-14 RX ADMIN — CARBAMIDE PEROXIDE 6.5% 5 DROP: 6.5 LIQUID AURICULAR (OTIC) at 08:49

## 2021-07-14 RX ADMIN — ARFORMOTEROL TARTRATE: 15 SOLUTION RESPIRATORY (INHALATION) at 07:36

## 2021-07-14 RX ADMIN — ACETAMINOPHEN 1000 MG: 500 TABLET ORAL at 11:31

## 2021-07-14 RX ADMIN — LEVETIRACETAM 1000 MG: 500 TABLET ORAL at 08:46

## 2021-07-14 RX ADMIN — Medication 1 AMPULE: at 08:42

## 2021-07-14 RX ADMIN — LISINOPRIL 20 MG: 20 TABLET ORAL at 08:47

## 2021-07-14 RX ADMIN — PYRIDOXINE HCL TAB 50 MG 50 MG: 50 TAB at 08:46

## 2021-07-14 RX ADMIN — GABAPENTIN 800 MG: 100 CAPSULE ORAL at 12:04

## 2021-07-14 RX ADMIN — PANTOPRAZOLE SODIUM 40 MG: 40 TABLET, DELAYED RELEASE ORAL at 08:48

## 2021-07-14 RX ADMIN — IPRATROPIUM BROMIDE 1 SPRAY: 42 SPRAY NASAL at 08:49

## 2021-07-14 RX ADMIN — THIAMINE HCL TAB 100 MG 100 MG: 100 TAB at 08:45

## 2021-07-14 RX ADMIN — FOLIC ACID 1 MG: 1 TABLET ORAL at 08:45

## 2021-07-14 RX ADMIN — CYANOCOBALAMIN TAB 500 MCG 1000 MCG: 500 TAB at 08:46

## 2021-07-14 RX ADMIN — GABAPENTIN 800 MG: 100 CAPSULE ORAL at 08:43

## 2021-07-14 RX ADMIN — FERROUS SULFATE TAB 325 MG (65 MG ELEMENTAL FE) 325 MG: 325 (65 FE) TAB at 08:47

## 2021-07-14 RX ADMIN — METOPROLOL SUCCINATE 50 MG: 50 TABLET, EXTENDED RELEASE ORAL at 08:46

## 2021-07-14 RX ADMIN — Medication 10 ML: at 06:53

## 2021-07-14 RX ADMIN — ACETAMINOPHEN 1000 MG: 500 TABLET ORAL at 06:51

## 2021-07-14 RX ADMIN — OXYCODONE 10 MG: 5 TABLET ORAL at 06:52

## 2021-07-14 RX ADMIN — RISPERIDONE 0.5 MG: 1 TABLET, FILM COATED ORAL at 08:45

## 2021-07-14 RX ADMIN — FLUTICASONE PROPIONATE 2 SPRAY: 50 SPRAY, METERED NASAL at 09:05

## 2021-07-14 RX ADMIN — DULOXETINE 120 MG: 30 CAPSULE, DELAYED RELEASE ORAL at 08:44

## 2021-07-14 RX ADMIN — GUAIFENESIN 1200 MG: 600 TABLET, EXTENDED RELEASE ORAL at 08:48

## 2021-07-14 NOTE — DISCHARGE SUMMARY
Hospitalist Discharge Summary     Patient ID:  Mady Garcia  [id number]  62 y.o.  1960    PCP on record: Angela Guadarrama MD    Admit date: 7/5/2021  Discharge date and time: 7/14/2021      DISCHARGE DIAGNOSIS:    Cervical stenosis s/p anterior cervical disc fusion      CONSULTATIONS:  IP CONSULT TO PULMONOLOGY  IP CONSULT TO HOSPITALIST  IP CONSULT TO NEUROSURGERY    ECervical stenosis s/p anterior cervical disc fusionxcerpted HPI from H&P of Marvin Jacome MD:  Mady Garcia is a 62 y.o. male.     Admitted yesteday through ER due to abnormal CXR and pulmonary workup. Has cervical stenosis.       ______________________________________________________________________  DISCHARGE SUMMARY/HOSPITAL COURSE:  for full details see H&P, daily progress notes, labs, consult notes. Cervical stenosis s/p anterior cervical disc fusion  Postoperative care per neurosurgery. Cervical collar in place. Hypertension continue current medications. Chronic alcoholism with concern for alcohol withdrawal  No withdrawal signs seen on discharge. Chronic respiratory failure with hypoxia on home oxygen  Emphysema  History of tobacco abuse  Abnormal CT of the chest  TANIA not on CPAP  CT of the chest showed   Suspected sarcoidosis other imaging finding in the lungs patient to follow with PCP there of chronic changes. PCP may need to send patient pulmonary follow-up. CT of the head and an MRI done showing . A 6 mm lesion in the parasagittal left parietal lobe as described above,  consistent with a small cavernous malformation, with a small amount of  surrounding chronic hemorrhage and neurosurgery consulted and patient to follow-up as an outpatient no intervention at this point.   Hypokalemia  Repleted  History of seizure disorder  gerd  Continue Keppra  Depression  Anxiety  Continue with risperidone, trazodone  Continue BuSpar, Cymbalta  Will reach out to his primary psychiatrist about my concerns for polypharmacy  Expected  the discharge date is tomorrow. _______________________________________________________________________  Patient seen and examined by me on discharge day. Pertinent Findings:  Gen:    Not in distress  Chest: Clear lungs  CVS:   Regular rhythm. No edema  Abd:  Soft, not distended, not tender  Neuro:  Alert,oriented times 4   _______________________________________________________________________  DISCHARGE MEDICATIONS:   Current Discharge Medication List      START taking these medications    Details   thiamine mononitrate (B-1) 100 mg tablet Take 1 Tablet by mouth daily. Qty: 30 Tablet, Refills: 0  Start date: 7/15/2021         CONTINUE these medications which have CHANGED    Details   metoprolol succinate (TOPROL-XL) 50 mg XL tablet Take 1 Tablet by mouth daily for 30 days. Qty: 30 Tablet, Refills: 0  Start date: 7/11/2021, End date: 8/10/2021      oxyCODONE IR (ROXICODONE) 5 mg immediate release tablet Take 1 Tablet by mouth every six (6) hours as needed for Pain for up to 7 days. Max Daily Amount: 20 mg.  Qty: 15 Tablet, Refills: 0  Start date: 7/10/2021, End date: 7/17/2021    Associated Diagnoses: Spinal stenosis of lumbar region, unspecified whether neurogenic claudication present         CONTINUE these medications which have NOT CHANGED    Details   tiZANidine (ZANAFLEX) 4 mg tablet Take 4 mg by mouth every eight (8) hours as needed for Muscle Spasm(s). lisinopriL (PRINIVIL, ZESTRIL) 10 mg tablet Take 20 mg by mouth daily. hydrOXYzine HCL (ATARAX) 25 mg tablet Take  by mouth three (3) times daily as needed. traZODone (DESYREL) 100 mg tablet Take 100 mg by mouth nightly. Take 2-3 tablets      DULoxetine (CYMBALTA) 60 mg capsule Take 120 mg by mouth daily. risperiDONE (RisperDAL) 1 mg tablet Take 1 mg by mouth three (3) times daily. cyanocobalamin (Vitamin B-12) 1,000 mcg tablet Take 1,000 mcg by mouth daily.       acetaminophen (TYLENOL) 500 mg tablet TAKE 2 TABLETS BY MOUTH EVERY 6 HOURS AS NEEDED FOR MILD PAIN OR MODERATE PAIN FOR UP TO 10 DAYS      ergocalciferol (ERGOCALCIFEROL) 1,250 mcg (50,000 unit) capsule TAKE 1 CAPSULE BY ORAL ROUTE WEEKLY FOR 90 DAYS      fluticasone propionate (FLONASE) 50 mcg/actuation nasal spray INSTILL 2 SPRAYS IN THE NOSTRILS DAILY      Mucinex 600 mg ER tablet 1,200 mg two (2) times a day. ipratropium (ATROVENT) 42 mcg (0.06 %) nasal spray 1 [place] by Nasal route three (3) times daily. 1-2 squirts in the nostrils      senna (Senexon) 8.6 mg tablet Take 2 Tabs by mouth two (2) times a day. ferrous sulfate 325 mg (65 mg iron) tablet Take  by mouth Daily (before breakfast). montelukast (SINGULAIR) 10 mg tablet Take 10 mg by mouth nightly. pyridoxine, vitamin B6, (VITAMIN B-6) 50 mg tablet Take 50 mg by mouth two (2) times a day.      gabapentin (NEURONTIN) 800 mg tablet Take 800 mg by mouth three (3) times daily. ondansetron hcl (ZOFRAN) 4 mg tablet Take 4 mg by mouth every six (6) hours as needed for Nausea. albuterol-ipratropium (DUO-NEB) 2.5 mg-0.5 mg/3 ml nebu 3 mL by Nebulization route every six (6) hours as needed. Oxygen Indications: 3 LITERS NC AT BEDTIME      atorvastatin (LIPITOR) 40 mg tablet Take 40 mg by mouth nightly. baclofen (LIORESAL) 10 mg tablet Take 10 mg by mouth three (3) times daily. budesonide-formoterol (SYMBICORT) 80-4.5 mcg/actuation HFAA Take 2 Puffs by inhalation two (2) times a day. Indications: BRONCHOSPASM PREVENTION WITH COPD  Qty: 2 Inhaler, Refills: 2      busPIRone (BUSPAR) 15 mg tablet Take 1 Tab by mouth three (3) times daily. Indications: Generalized Anxiety Disorder  Qty: 90 Tab, Refills: 2      levETIRAcetam (KEPPRA) 1,000 mg tablet Take 1 Tab by mouth two (2) times a day. Indications: PARTIAL EPILEPSY TREATMENT ADJUNCT  Qty: 60 Tab, Refills: 2      pantoprazole (PROTONIX) 40 mg tablet Take 1 Tab by mouth Before breakfast and dinner.   Qty: 60 Tab, Refills: 2      tiotropium (SPIRIVA) 18 mcg inhalation capsule Take 1 Cap by inhalation daily. Qty: 30 Cap, Refills: 2         STOP taking these medications       diclofenac potassium (CATAFLAM) 50 mg tablet Comments:   Reason for Stopping:         Ear Drops 6.5 % otic solution Comments:   Reason for Stopping:               My Recommended Diet, Activity, Wound Care, and follow-up labs are listed in the patient's Discharge Insturctions which I have personally completed and reviewed.     ______________________________________________________________________    Risk of deterioration: High    Condition at Discharge:  Stable  ______________________________________________________________________    Disposition  Home with family and home health services    ______________________________________________________________________    Care Plan discussed with:   Patient, Family, RN, Care Manager, Consultant    Comment:   ______________________________________________________________________  Total NON critical care TIME: 35   Minutes    Code Status: Full Code  ___

## 2021-07-14 NOTE — PROGRESS NOTES
Ortho / Neurosurgery NP Note    POD# 7  s/p CERVICAL 3 TO CERVICAL 7 ANTERIOR CERVICAL DISCECTOMY AND FUSION   Pt seen with no visitor present. Pt seen resting in bed, in NAD. Feeling much better today. Complaints of mild frontal HA today, states no HA overnight. States pre-op radicular symptoms in BUE and BLE have resolved. Feels he has last endurance during hospital stay contributing to more SOB on exertion from baseline  Denies SOB at rest on room air. Sore throat improving. Strong voice. No issues swallowing. Tolerating regular diet. VSS Afebrile. Room air. Wears 3L O2 with exertion at baseline       Visit Vitals  /75   Pulse 99   Temp 98.3 °F (36.8 °C)   Resp 16   Ht 5' 11\" (1.803 m)   Wt 97.5 kg (215 lb)   SpO2 94%   BMI 29.99 kg/m²       Voiding status: voiding   Output (mL)  Urine Voided: 475 ml (07/14/21 0655)  Stool: 1 ml (07/08/21 2208)  Last Bowel Movement Date: 07/12/21 (07/13/21 2020)  Unmeasurable Output  Urine Occurrence(s): 1 (07/10/21 1301)  Stool Occurrence(s): 1 (07/09/21 0929)      Labs    Lab Results   Component Value Date/Time    HGB 11.0 (L) 07/13/2021 05:27 AM      Lab Results   Component Value Date/Time    INR 1.0 07/06/2021 04:04 AM      Lab Results   Component Value Date/Time    Sodium 136 07/13/2021 05:27 AM    Potassium 4.3 07/13/2021 05:27 AM    Chloride 105 07/13/2021 05:27 AM    CO2 26 07/13/2021 05:27 AM    Glucose 115 (H) 07/13/2021 05:27 AM    BUN 12 07/13/2021 05:27 AM    Creatinine 0.77 07/13/2021 05:27 AM    Calcium 9.6 07/13/2021 05:27 AM     Recent Glucose Results:   No results found for: GLU, GLUPOC, GLUCPOC    Body mass index is 29.99 kg/m². : A BMI > 30 is classified as obesity and > 40 is classified as morbid obesity. Prineo dressing c.d.i  MADI drain removed 7/7   Calves soft and supple; No pain with passive stretch  Sensation and motor intact - FUNES. C5-T1 intact.   Denies pain, numbness or tingling  in BUE  SCDs for mechanical DVT proph while in bed     PLAN:  1) Neurovascular assessment q4 hours   2) PT/OT - Hard cervical collar. 3) Pain control - scheduled tylenol, prn oxycodone and valium   4) Medical management per Hospitalist - appreciate assistance. MRI obtained for c/o HAs - possible small cavernous malformation, and small chronic hemorrhage - Neurosurgeon to evaluate prior to discharge  5) ETOH abuse - CIWA started on 7/7 for symptoms of ETOH withdrawal. Last dose Ativan 7/8. No symptoms currently. Reports drinking 24 oz beer 3-4x/week, rarely drinks liquor. 6) Discharge planning - lives alone with CNA who helps with IADLs, relys on local [employer] friends for additional support, uses medicaid transportation. He cleared therapy on POD1. Plans to return home with [place]. Discharge per primary team.      Addendum @ 1100: MRI reviewed by Dr Tawanna anne to f/u in office as OP.      Arline Dietz, SHARI

## 2021-07-14 NOTE — ROUTINE PROCESS
Attempted to schedule PCP ROY[facility] apt with Dr. Cole Castellanos, unable to receive answer with practice.   Left voicemail with nurse requesting that they contact patient directly with appointment information

## 2021-07-14 NOTE — PROGRESS NOTES
Bedside shift change report given to [facility], ,RN (oncoming nurse) by Finn Chinchilla RN (offgoing nurse). Report included the following information SBAR, Kardex, ED Summary, Intake/Output, MAR and Recent Results.

## 2021-07-14 NOTE — PROGRESS NOTES
Transition of Care Plan:     RUR: 17%  Disposition: HH: [facility]   Follow up appointments: PCP  DME needed: DME not recommended for D/C   Transportation at Discharge:Medicaid transport   [address] or means to access home:  Pt to D/C home w/keys    IM Medicare letter: N/A  Caregiver Contact: Amber Torres [phone]  Discharge Caregiver contacted prior to discharge? To be contacted upon D/C            CM notified of Pt discharging today. CM spoke with Pt to confirm if Pt had transportation home. Pt stated that he does not, Pt stated that he relies on Medicaid transportation. CM arranged Medicaid transportation through [address]. Requested  for 2pm with a 3 hour window time.        Raffi Jang, NNAMDI Sawyer

## 2021-07-14 NOTE — PROGRESS NOTES
200 - Neurosurgery consult placed with [facility] for Dr. Sin Ferrara regarding a s.mall cavernous malformation, with a small amount of chronic hemorrage in brain MRI as ordered by Dr. Luz Chilel. 1400 -   DISCHARGE NOTE FROM ORTHO NURSE    Patient determined to be stable for discharge by attending provider. I have reviewed the discharge instructions with the patient. They verbalized understanding and all questions were answered to their satisfaction. No complaints or further questions were expressed. Medications sent to pharmacy. Appropriate educational materials and medication side effect teaching were provided. PIV were removed prior to discharge. Patient did not discharge with any line, johnson, or drain. Personal items and valuables accounted for at discharge by patient and/or family: YES    Post-op patient: Yes- Patient given post-op discharge kit and instructed on use.        Devika Hess RN

## 2021-07-14 NOTE — PROGRESS NOTES
@ 7601 - Patient called inquiring about paper Oxy prescription. Amador Minor, NP e-scribing to pharmacy on file. Patient verbalizes understanding.

## 2021-07-14 NOTE — PROGRESS NOTES
Problem: Falls - Risk of  Goal: *Absence of Falls  Description: Document Cecelia Fuller Fall Risk and appropriate interventions in the flowsheet. Outcome: Progressing Towards Goal  Note: Fall Risk Interventions:  Mobility Interventions: Communicate number of staff needed for ambulation/transfer    Mentation Interventions: Adequate sleep, hydration, pain control    Medication Interventions: Patient to call before getting OOB, Teach patient to arise slowly    Elimination Interventions: Call light in reach              Problem: Patient Education: Go to Patient Education Activity  Goal: Patient/Family Education  Outcome: Progressing Towards Goal     Problem: Risk for Spread of Infection  Goal: Prevent transmission of infectious organism to others  Description: Prevent the transmission of infectious organisms to other patients, staff members, and visitors.   Outcome: Progressing Towards Goal     Problem: Simple Spine Procedure: Discharge Outcomes  Goal: *Optimal pain control at patient's stated goal  Outcome: Progressing Towards Goal  Goal: *Demonstrates ability to place and remove stabilization device  Outcome: Progressing Towards Goal  Goal: *Progress independence mobility/activities (eg: Mobility precautions)  Outcome: Progressing Towards Goal  Goal: *Resumes normal function of bladder and bowel  Outcome: Progressing Towards Goal  Goal: *Lungs clear or at baseline  Outcome: Progressing Towards Goal  Goal: *Verbalizes name, dosage, time, side effects, and number of days to continue medications  Outcome: Progressing Towards Goal  Goal: *Modified independence with transfers, ambulation on levels with assistance devices, stair climbing, ADL's  Outcome: Progressing Towards Goal  Goal: *Describes follow-up/return visits to physicians  Outcome: Progressing Towards Goal  Goal: *Describes available resources and support systems  Outcome: Progressing Towards Goal  Goal: *Labs within defined limits  Outcome: Progressing Towards Goal  Goal: *Tolerating diet  Outcome: Progressing Towards Goal     Problem: Patient Education: Go to Patient Education Activity  Goal: Patient/Family Education  Outcome: Progressing Towards Goal

## 2022-02-07 ENCOUNTER — TRANSCRIBE ORDER (OUTPATIENT)
Dept: SCHEDULING | Age: 62
End: 2022-02-07
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
## 2022-02-07 DIAGNOSIS — M54.2 CERVICALGIA: ICD-10-CM

## 2022-02-07 DIAGNOSIS — M54.50 CHRONIC BILATERAL LOW BACK PAIN, UNSPECIFIED WHETHER SCIATICA PRESENT: ICD-10-CM

## 2022-02-07 DIAGNOSIS — M50.30 DDD (DEGENERATIVE DISC DISEASE), CERVICAL: ICD-10-CM

## 2022-02-07 DIAGNOSIS — M54.50 LUMBAR PAIN: Primary | ICD-10-CM

## 2022-02-07 DIAGNOSIS — M54.12 CERVICAL RADICULOPATHY: ICD-10-CM

## 2022-02-07 DIAGNOSIS — M47.812 CERVICAL SPONDYLOSIS WITHOUT MYELOPATHY: ICD-10-CM

## 2022-02-07 DIAGNOSIS — M48.02 SPINAL STENOSIS IN CERVICAL REGION: ICD-10-CM

## 2022-02-07 DIAGNOSIS — M96.1 POSTLAMINECTOMY SYNDROME, LUMBAR REGION: ICD-10-CM

## 2022-02-07 DIAGNOSIS — M51.36 DDD (DEGENERATIVE DISC DISEASE), LUMBAR: ICD-10-CM

## 2022-02-07 DIAGNOSIS — Z98.1 S/P LUMBAR SPINAL FUSION: ICD-10-CM

## 2022-02-07 DIAGNOSIS — Z98.1 S/P SPINAL FUSION: ICD-10-CM

## 2022-02-07 DIAGNOSIS — G89.29 CHRONIC BILATERAL LOW BACK PAIN, UNSPECIFIED WHETHER SCIATICA PRESENT: ICD-10-CM

## 2022-02-07 DIAGNOSIS — M48.02 FORAMINAL STENOSIS OF CERVICAL REGION: ICD-10-CM

## 2022-03-18 PROBLEM — M48.061 SPINAL STENOSIS OF LUMBAR REGION: Status: ACTIVE | Noted: 2019-09-26

## 2022-03-19 PROBLEM — Z22.322 MRSA CARRIER: Status: ACTIVE | Noted: 2019-09-26

## 2022-03-19 PROBLEM — M48.062 SPINAL STENOSIS OF LUMBAR REGION WITH NEUROGENIC CLAUDICATION: Status: ACTIVE | Noted: 2020-08-25

## 2022-03-19 PROBLEM — M48.02 CERVICAL SPINAL STENOSIS: Status: ACTIVE | Noted: 2021-07-05

## 2022-03-19 PROBLEM — F33.2 MAJOR DEPRESSIVE DISORDER, RECURRENT EPISODE, SEVERE (HCC): Status: ACTIVE | Noted: 2018-08-15

## 2022-03-19 PROBLEM — F10.20 ALCOHOL DEPENDENCE (HCC): Status: ACTIVE | Noted: 2018-08-15

## 2022-03-19 PROBLEM — F32.A DEPRESSION: Status: ACTIVE | Noted: 2018-08-15

## 2022-03-20 PROBLEM — R73.09 ELEVATED HEMOGLOBIN A1C: Status: ACTIVE | Noted: 2019-09-25

## 2022-03-23 ENCOUNTER — HOSPITAL ENCOUNTER (OUTPATIENT)
Dept: MRI IMAGING | Age: 62
Discharge: HOME | End: 2022-03-23
Attending: ORTHOPAEDIC SURGERY
Payer: MEDICAID

## 2022-03-23 DIAGNOSIS — M54.12 CERVICAL RADICULOPATHY: ICD-10-CM

## 2022-03-23 DIAGNOSIS — M48.02 SPINAL STENOSIS IN CERVICAL REGION: ICD-10-CM

## 2022-03-23 DIAGNOSIS — Z98.1 S/P LUMBAR SPINAL FUSION: ICD-10-CM

## 2022-03-23 DIAGNOSIS — Z98.1 S/P SPINAL FUSION: ICD-10-CM

## 2022-03-23 DIAGNOSIS — M54.2 CERVICALGIA: ICD-10-CM

## 2022-03-23 DIAGNOSIS — M47.812 CERVICAL SPONDYLOSIS WITHOUT MYELOPATHY: ICD-10-CM

## 2022-03-23 DIAGNOSIS — M51.36 DDD (DEGENERATIVE DISC DISEASE), LUMBAR: ICD-10-CM

## 2022-03-23 DIAGNOSIS — M48.02 FORAMINAL STENOSIS OF CERVICAL REGION: ICD-10-CM

## 2022-03-23 DIAGNOSIS — G89.29 CHRONIC BILATERAL LOW BACK PAIN, UNSPECIFIED WHETHER SCIATICA PRESENT: ICD-10-CM

## 2022-03-23 DIAGNOSIS — M96.1 POSTLAMINECTOMY SYNDROME, LUMBAR REGION: ICD-10-CM

## 2022-03-23 DIAGNOSIS — M54.50 LUMBAR PAIN: ICD-10-CM
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
## 2022-03-23 DIAGNOSIS — M54.50 CHRONIC BILATERAL LOW BACK PAIN, UNSPECIFIED WHETHER SCIATICA PRESENT: ICD-10-CM

## 2022-03-23 DIAGNOSIS — M50.30 DDD (DEGENERATIVE DISC DISEASE), CERVICAL: ICD-10-CM

## 2022-03-23 PROCEDURE — 74011250636 HC RX REV CODE- 250/636

## 2022-03-23 PROCEDURE — 72158 MRI LUMBAR SPINE W/O & W/DYE: CPT

## 2022-03-23 PROCEDURE — A9576 INJ PROHANCE MULTIPACK: HCPCS

## 2022-03-23 RX ADMIN — GADOTERIDOL 15 ML: 279.3 INJECTION, SOLUTION INTRAVENOUS at 10:47

## 2022-03-23 RX ADMIN — GADOTERIDOL 5 ML: 279.3 INJECTION, SOLUTION INTRAVENOUS at 10:48

## 2022-03-23 NOTE — PROGRESS NOTES
Pt arrived via transportation for his MRI at 0930 for a sedation scheduled for 1000. Pt understood he couldn't be sedated today d/t arriving late and no one was here with him. His CNA was supposed to come but didn't. He decided to try MRI w/o sedation after this nurse told him we would do everything possible to make him comfortable. He wanted to quit before the contrast but encouraged to finish which he did. He was very appreciative.

## 2024-04-10 ENCOUNTER — HOSPITAL ENCOUNTER (OUTPATIENT)
Facility: HOSPITAL | Age: 64
Discharge: HOME | End: 2024-04-13
Attending: ORTHOPAEDIC SURGERY
Payer: MEDICAID

## 2024-04-10 VITALS
DIASTOLIC BLOOD PRESSURE: 72 MMHG | WEIGHT: 191 LBS | RESPIRATION RATE: 11 BRPM | OXYGEN SATURATION: 95 % | HEIGHT: 71 IN | SYSTOLIC BLOOD PRESSURE: 134 MMHG | BODY MASS INDEX: 26.74 KG/M2 | HEART RATE: 90 BPM

## 2024-04-10 DIAGNOSIS — M96.1 POSTLAMINECTOMY SYNDROME, LUMBAR REGION: ICD-10-CM

## 2024-04-10 DIAGNOSIS — Z98.1 S/P LUMBAR SPINAL FUSION: ICD-10-CM

## 2024-04-10 DIAGNOSIS — M48.02 SPINAL STENOSIS IN CERVICAL REGION: ICD-10-CM

## 2024-04-10 DIAGNOSIS — M79.18 MYOFASCIAL PAIN: ICD-10-CM

## 2024-04-10 DIAGNOSIS — G89.29 CHRONIC LOW BACK PAIN, UNSPECIFIED BACK PAIN LATERALITY, UNSPECIFIED WHETHER SCIATICA PRESENT: ICD-10-CM

## 2024-04-10 DIAGNOSIS — Z98.1 S/P SPINAL FUSION: ICD-10-CM

## 2024-04-10 DIAGNOSIS — M54.50 CHRONIC LOW BACK PAIN, UNSPECIFIED BACK PAIN LATERALITY, UNSPECIFIED WHETHER SCIATICA PRESENT: ICD-10-CM

## 2024-04-10 DIAGNOSIS — M51.36 DDD (DEGENERATIVE DISC DISEASE), LUMBAR: ICD-10-CM

## 2024-04-10 DIAGNOSIS — M54.31 RIGHT SIDED SCIATICA: ICD-10-CM

## 2024-04-10 DIAGNOSIS — M54.2 CERVICALGIA: ICD-10-CM

## 2024-04-10 DIAGNOSIS — M48.02 SPINAL STENOSIS OF CERVICAL REGION: ICD-10-CM

## 2024-04-10 DIAGNOSIS — M54.16 LUMBAR RADICULOPATHY: ICD-10-CM

## 2024-04-10 DIAGNOSIS — Z98.1 S/P CERVICAL SPINAL FUSION: ICD-10-CM

## 2024-04-10 PROCEDURE — 6360000002 HC RX W HCPCS: Performed by: RADIOLOGY

## 2024-04-10 PROCEDURE — 72141 MRI NECK SPINE W/O DYE: CPT

## 2024-04-10 RX ORDER — SODIUM CHLORIDE 9 MG/ML
INJECTION, SOLUTION INTRAVENOUS CONTINUOUS
Status: DISCONTINUED | OUTPATIENT
Start: 2024-04-10 | End: 2024-04-14 | Stop reason: HOSPADM

## 2024-04-10 RX ORDER — MIDAZOLAM HYDROCHLORIDE 1 MG/ML
5 INJECTION INTRAMUSCULAR; INTRAVENOUS ONCE
Status: DISCONTINUED | OUTPATIENT
Start: 2024-04-10 | End: 2024-04-10

## 2024-04-10 RX ORDER — FENTANYL CITRATE 50 UG/ML
100 INJECTION, SOLUTION INTRAMUSCULAR; INTRAVENOUS
Status: DISCONTINUED | OUTPATIENT
Start: 2024-04-10 | End: 2024-04-14 | Stop reason: HOSPADM

## 2024-04-10 RX ADMIN — FENTANYL CITRATE 100 MCG: 50 INJECTION INTRAMUSCULAR; INTRAVENOUS at 16:15

## 2024-04-10 RX ADMIN — MIDAZOLAM 3 MG: 1 INJECTION INTRAMUSCULAR; INTRAVENOUS at 16:13

## 2024-04-10 ASSESSMENT — PAIN - FUNCTIONAL ASSESSMENT
PAIN_FUNCTIONAL_ASSESSMENT: 0-10
PAIN_FUNCTIONAL_ASSESSMENT: PREVENTS OR INTERFERES SOME ACTIVE ACTIVITIES AND ADLS

## 2024-04-10 ASSESSMENT — PAIN SCALES - GENERAL: PAINLEVEL_OUTOF10: 6

## 2024-04-10 ASSESSMENT — PAIN DESCRIPTION - DESCRIPTORS: DESCRIPTORS: ACHING

## 2024-04-10 NOTE — PROGRESS NOTES
Pt transferred back to xray recovery via stretcher accomp. By nurse with HOB elevated approx 45 degrees.  Pt awake, alert and oriented x 3.  Pt stating disbelief procedure couldn't be completed r/t movement of his bottom Jaw/Chin (chewing motion).  Pt's pulse ox decreased per Sedation/Analgesia sheet - informed pt. Of decreased pulse ox during sedation.  Informed pt. He may need anesthesia to complete study with no movement and maintenance of pulse ox levels and pt. States \"that is what it was suppose to be today\".  Informed pt. He had order with sedation only. Pt requesting something to eat drink upon return to xray recovery.  Pepsi and peanut butter crackers given to pt.  Pt tolerating without difficulty.

## 2024-04-10 NOTE — DISCHARGE INSTRUCTIONS
[facility]  Radiology Department  [phone]    Radiologist:  Dr. Maribel Daugherty and/or Associate    Date:  4/10/2024        MRI With Sedation Discharge Instructions      Go home and rest and restrict your activity the next 24 hours.     You have been given sedating medications, so do not drive or drink alcohol today.     Resume your previous diet and medications.    You may return to work and resume normal activities tomorrow.      Results of your MRI will be sent to your physician as soon as they become available.

## 2024-04-10 NOTE — PROGRESS NOTES
Name of procedure:  MRI cervical spine with conscious sedation    Sedation medications given:  yes    Versed:  3 mg    Fentanyl:  100 mcg    Reversal Agent Used:  none    Sedation tolerated:   pulse ox decreased to 84% on 4L/NC - tactile stimuli with pt.'s immed. Return to 98% on 4L/NC then pulse ox decreased again to 84-85% on 4L/NC with increased movement of Jaw per Buddhist, MRI tech and procedure stopped.    Sedation start:  1505    Sedation end:  1530    Vital Signs:  see sedation/analgesia sheet    Any complications related to procedure:  see above - reviewed with Radha Dhaliwal NP    Post Procedure Care Needed/order sets placed in [facility] care.     Patient is at increased fall risk due to medication given.

## 2024-08-21 NOTE — H&P
extended release tablet 1,200 mg 2 times daily      hydrOXYzine HCl (ATARAX) 25 MG tablet Take by mouth 3 times daily as needed      ipratropium (ATROVENT) 0.06 % nasal spray 1 spray by Nasal route 3 times daily      ipratropium-albuterol (DUONEB) 0.5-2.5 (3) MG/3ML SOLN nebulizer solution Inhale 3 mLs into the lungs every 6 hours as needed      levETIRAcetam (KEPPRA) 1000 MG tablet Take 1,000 mg by mouth 2 times daily      lisinopril (PRINIVIL;ZESTRIL) 10 MG tablet Take 20 mg by mouth daily      montelukast (SINGULAIR) 10 MG tablet Take 10 mg by mouth nightly      ondansetron (ZOFRAN) 4 MG tablet Take 4 mg by mouth every 6 hours as needed      pantoprazole (PROTONIX) 40 MG tablet Take 40 mg by mouth 2 times daily (before meals)      pyridoxine (B-6) 50 MG tablet Take 50 mg by mouth 2 times daily      risperiDONE (RISPERDAL) 1 MG tablet Take 1 mg by mouth 3 times daily      senna (SENOKOT) 8.6 MG tablet Take 2 tablets by mouth 2 times daily      thiamine 100 MG tablet Take 100 mg by mouth daily      tiotropium (SPIRIVA) 18 MCG inhalation capsule Inhale 18 mcg into the lungs daily      tiZANidine (ZANAFLEX) 4 MG tablet Take 4 mg by mouth every 8 hours as needed      traZODone (DESYREL) 100 MG tablet Take 100 mg by mouth       Current Facility-Administered Medications   Medication Dose Route Frequency Provider Last Rate Last Admin    midazolam (VERSED) injection 5 mg  5 mg IntraVENous Once Maribel Daugherty MD           ALLERGIES  Allergies   Allergen Reactions    Varenicline Seizure       DIAGNOSTIC STUDIES   IMAGING STUDIES  N/A    LABS  Lab Results   Component Value Date/Time    WBC 11.3 07/13/2021 05:27 AM    HGB 11.0 07/13/2021 05:27 AM    HCT 35.0 07/13/2021 05:27 AM     07/13/2021 05:27 AM    MCV 85.6 07/13/2021 05:27 AM     Lab Results   Component Value Date/Time     07/13/2021 05:27 AM    K 4.3 07/13/2021 05:27 AM     07/13/2021 05:27 AM    CO2 26 07/13/2021 05:27 AM    BUN 12 07/13/2021  Awake/Alert/Cooperative

## 2024-08-22 ENCOUNTER — HOSPITAL ENCOUNTER (OUTPATIENT)
Facility: HOSPITAL | Age: 64
End: 2024-08-22
Attending: ORTHOPAEDIC SURGERY
Payer: MEDICAID

## 2024-08-22 ENCOUNTER — HOSPITAL ENCOUNTER (OUTPATIENT)
Facility: HOSPITAL | Age: 64
Discharge: HOME | End: 2024-08-22
Attending: ORTHOPAEDIC SURGERY
Payer: MEDICAID

## 2024-08-22 VITALS
TEMPERATURE: 98.1 F | SYSTOLIC BLOOD PRESSURE: 126 MMHG | RESPIRATION RATE: 12 BRPM | HEART RATE: 82 BPM | WEIGHT: 187 LBS | HEIGHT: 68 IN | DIASTOLIC BLOOD PRESSURE: 72 MMHG | BODY MASS INDEX: 28.34 KG/M2 | OXYGEN SATURATION: 97 %

## 2024-08-22 DIAGNOSIS — M54.32 BILATERAL SCIATICA: ICD-10-CM

## 2024-08-22 DIAGNOSIS — R26.9 GAIT DIFFICULTY: ICD-10-CM

## 2024-08-22 DIAGNOSIS — M54.16 LUMBAR RADICULOPATHY: ICD-10-CM

## 2024-08-22 DIAGNOSIS — Z98.1 S/P SPINAL FUSION: ICD-10-CM

## 2024-08-22 DIAGNOSIS — Z98.1 S/P LUMBAR SPINAL FUSION: ICD-10-CM

## 2024-08-22 DIAGNOSIS — M48.02 SPINAL STENOSIS IN CERVICAL REGION: ICD-10-CM

## 2024-08-22 DIAGNOSIS — M96.1 POSTLAMINECTOMY SYNDROME, LUMBAR REGION: ICD-10-CM

## 2024-08-22 DIAGNOSIS — M54.50 LUMBAR PAIN: ICD-10-CM

## 2024-08-22 DIAGNOSIS — M48.02 FORAMINAL STENOSIS OF CERVICAL REGION: ICD-10-CM

## 2024-08-22 DIAGNOSIS — M54.2 CERVICALGIA: ICD-10-CM

## 2024-08-22 DIAGNOSIS — M54.31 BILATERAL SCIATICA: ICD-10-CM

## 2024-08-22 PROCEDURE — 6360000002 HC RX W HCPCS: Performed by: RADIOLOGY

## 2024-08-22 PROCEDURE — 99152 MOD SED SAME PHYS/QHP 5/>YRS: CPT

## 2024-08-22 PROCEDURE — 72148 MRI LUMBAR SPINE W/O DYE: CPT

## 2024-08-22 RX ORDER — MIDAZOLAM HYDROCHLORIDE 1 MG/ML
5 INJECTION INTRAMUSCULAR; INTRAVENOUS
Status: DISCONTINUED | OUTPATIENT
Start: 2024-08-22 | End: 2024-08-26 | Stop reason: HOSPADM

## 2024-08-22 RX ORDER — FENTANYL CITRATE 50 UG/ML
100 INJECTION, SOLUTION INTRAMUSCULAR; INTRAVENOUS
Status: DISCONTINUED | OUTPATIENT
Start: 2024-08-22 | End: 2024-08-26 | Stop reason: HOSPADM

## 2024-08-22 RX ADMIN — FENTANYL CITRATE 25 MCG: 50 INJECTION INTRAMUSCULAR; INTRAVENOUS at 12:40

## 2024-08-22 RX ADMIN — FENTANYL CITRATE 25 MCG: 50 INJECTION INTRAMUSCULAR; INTRAVENOUS at 12:38

## 2024-08-22 RX ADMIN — MIDAZOLAM 2 MG: 1 INJECTION INTRAMUSCULAR; INTRAVENOUS at 12:36

## 2024-08-22 ASSESSMENT — PAIN - FUNCTIONAL ASSESSMENT: PAIN_FUNCTIONAL_ASSESSMENT: NONE - DENIES PAIN

## 2024-08-22 NOTE — PROGRESS NOTES
Placed on NRB post medication administration, hx of sleep apnea, O2 Sats dropped into the 80's, back into 90's on NRB.

## 2024-08-22 NOTE — FLOWSHEET NOTE
Reports being on 3L of O2 via N/C but left it in his car. Upon initial v/s check, pulse oxygen saturation was 88%. Once placed on 3L of oxygen, saturation level went up to 98%.

## 2024-08-22 NOTE — DISCHARGE INSTRUCTIONS
[facility]  Department of Interventional Radiology  [address] [place] [address] [phone]    MRI CONSCIOUS SEDATION DISCHARGE EDUCATION     Radiologist: [facility] Radiology/LARRY Dhaliwal NP    Date: 08/22/2024    Information:   You have been given a sedative medication to assist with your MRI. You may experience a short period of memory loss during the time the sedation is effective. This medication may also impair your judgment for up to 24 hours. Sedation is safe with very few side effects but most patients have feelings of tiredness afterwards. Some people may believe they are fine and unaffected by the medication but their reactions will be slower. This is similar to how some people may feel after having alcohol.     What should I expect after Conscious Sedation (IV sedation) ?    You must have someone drive you home when you leave the hospital.    For 24 hours after your sedation, do not do anything where you need to be mentally alert. This includes making important decisions, operating machinery, signing important papers, etc.     Eat light for the first 24 hours, and then start eating more as you are able. Drink plenty of fluids.    If you are taking pain medications:   ? Take as directed   ? Do not drink alcohol while taking narcotic pain medication   ? Do not drive   ? If you are constipated, drink more fluids and eat more fiber. You can also use an over-the-counter stool softener.     Follow-up visit information:   Follow up with Physician for the results of the MRI.     Occasionally, a situation will require prompt attention and an emergency room visit is necessary:    Chest pain.     Shortness of breath    Trouble controlling your bladder or bowels    Temperature over 100.3? or higher, or shaking chills    Drop in blood pressure, and/or light-headed feeling    Severe pain or increased redness, swelling, drainage, or warmth around the incision sites

## 2024-08-22 NOTE — PROGRESS NOTES
Arrived into the xray recovery area A/O x 4 via w/c. Here today for an MRI with sedation. Reports having claustrophobia. Is currently calm, and complaint free.     LARRY Dhaliwal NP at bedside. Informed consent obtained.

## (undated) DEVICE — SPONGE GZ W4XL4IN COT 12 PLY TYP VII WVN C FLD DSGN

## (undated) DEVICE — DRAPE,LAPAROTOMY,PCH,STERILE: Brand: MEDLINE

## (undated) DEVICE — Device

## (undated) DEVICE — SURGIFOAM SPNG SZ 100

## (undated) DEVICE — STERILE POLYISOPRENE POWDER-FREE SURGICAL GLOVES WITH EMOLLIENT COATING: Brand: PROTEXIS

## (undated) DEVICE — CONTAINER,SPECIMEN,3OZ,OR STRL: Brand: MEDLINE

## (undated) DEVICE — NEEDLE HYPO 22GA L1.5IN BLK S STL HUB POLYPR SHLD REG BVL

## (undated) DEVICE — NDL SPNE QNCKE 18GX3.5IN LF --

## (undated) DEVICE — LABEL MED MRMC ORTH STRL

## (undated) DEVICE — SUTURE VCRL UD BR CT 3-0 18IN CT1 J838D

## (undated) DEVICE — SMOKE EVACUATION PENCIL: Brand: VALLEYLAB

## (undated) DEVICE — BLADE ES L4IN INSUL EDGE

## (undated) DEVICE — CASPAR DISTR PIN12MMSTER: Brand: AESCULAP

## (undated) DEVICE — COVER,MAYO STAND,STERILE: Brand: MEDLINE

## (undated) DEVICE — 3.0MM PRECISION NEURO (MATCH HEAD)

## (undated) DEVICE — INFECTION CONTROL KIT SYS

## (undated) DEVICE — BIPOLAR FORCEPS CORD: Brand: VALLEYLAB

## (undated) DEVICE — DRAIN SURG 10FR L1/8IN DIA3.2MM SIL CHN RND HUBLESS FULL

## (undated) DEVICE — POSITIONER HD REST FOAM CMFRT TCH

## (undated) DEVICE — PREP KIT PEEL PTCH POVIDONE IOD

## (undated) DEVICE — AEGIS 1" DISK 4MM HOLE, PEEL OPEN: Brand: MEDLINE

## (undated) DEVICE — SUTURE VCRL SZ 2-0 L18IN ABSRB UD CT-1 L36MM 1/2 CIR J839D

## (undated) DEVICE — DRAPE C ARM DISP FOR EXCELSIUSGPS ROBOTIC NAVIGATION PLATFRM

## (undated) DEVICE — GOWN,SIRUS,NONRNF,SETINSLV,2XL,18/CS: Brand: MEDLINE

## (undated) DEVICE — 3M™ STERI-DRAPE™ INSTRUMENT POUCH 1018: Brand: STERI-DRAPE™

## (undated) DEVICE — SUTURE VCRL 2-0 L27IN ABSRB UD CP-2 L26MM 1/2 CIR REV CUT J869H

## (undated) DEVICE — 16MM DRILL BIT

## (undated) DEVICE — NEEDLE BX 11GA L152MM STREAMLINED SUP SHRP T HNDL TRCR TAPR

## (undated) DEVICE — SPONGE: SPECIALTY PEANUT XR 100/CS: Brand: MEDICAL ACTION INDUSTRIES

## (undated) DEVICE — STERILE POLYISOPRENE POWDER-FREE SURGICAL GLOVES: Brand: PROTEXIS

## (undated) DEVICE — 4-PORT MANIFOLD: Brand: NEPTUNE 2

## (undated) DEVICE — TUBING, SUCTION, 1/4" X 10', STRAIGHT: Brand: MEDLINE

## (undated) DEVICE — SOLUTION IV 1000ML 0.9% SOD CHL

## (undated) DEVICE — DRAPE MON DISP FOR EXCELSIUSGPS ROBOTIC NAVIGATION PLATFRM

## (undated) DEVICE — FLOSEAL MATRIX IS INDICATED IN SURGICAL PROCEDURES (OTHER THAN IN OPHTHALMIC) AS AN ADJUNCT TO HEMOSTASIS WHEN CONTROL OF BLEEDING BY LIGATURE OR CONVENTIONALPROCEDURES IS INEFFECTIVE OR IMPRACTICAL.: Brand: FLOSEAL HEMOSTATIC MATRIX

## (undated) DEVICE — SUTURE VCRL SZ 1 L18IN ABSRB VLT CT-1 L36MM 1/2 CIR J741D

## (undated) DEVICE — DRAPE MICSCP 65MM LENS FOR LEICA VARI-LENS2

## (undated) DEVICE — LAMINECTOMY RICHMOND-LF: Brand: MEDLINE INDUSTRIES, INC.

## (undated) DEVICE — COVER,TABLE,HEAVY DUTY,77"X90",STRL: Brand: MEDLINE

## (undated) DEVICE — BONE WAX WHITE: Brand: BONE WAX WHITE

## (undated) DEVICE — SNAP KOVER: Brand: UNBRANDED

## (undated) DEVICE — 450 ML BOTTLE OF 0.05% CHLORHEXIDINE GLUCONATE IN 99.95% STERILE WATER FOR IRRIGATION, USP AND APPLICATOR.: Brand: IRRISEPT ANTIMICROBIAL WOUND LAVAGE

## (undated) DEVICE — SUTURE VCRL SZ 1 L27IN ABSRB VLT L36MM CT-1 1/2 CIR J341H

## (undated) DEVICE — TOOL 14MH30 LEGEND 14CM 3MM: Brand: MIDAS REX ™

## (undated) DEVICE — PENCIL SMK EVAC L10FT DIA95MM TBNG NONSTICK W ADPT TO 22MM

## (undated) DEVICE — DRAPE XR C ARM 41X74IN LF --

## (undated) DEVICE — Z CONVERTED USE 2107985 COVER FLROSCP W36XL28IN 4 SIDE ADH

## (undated) DEVICE — SUTURE VCRL SZ 3-0 L27IN ABSRB UD FS-2 L19MM 1/2 CIR J423H

## (undated) DEVICE — HALTER TRACTION HD W/ TRI COTTON LINING FOAM LTX

## (undated) DEVICE — SUT PROL 6-0 18IN RB2 DA BLU --

## (undated) DEVICE — SYR 50ML LR LCK 1ML GRAD NSAF --

## (undated) DEVICE — C-ARMOR C-ARM EQUIPMENT COVERS CLEAR STERILE UNIVERSAL FIT 12 PER CASE: Brand: C-ARMOR

## (undated) DEVICE — Z DISCONTINUED USE 2717541 SUTURE STRATAFIX SZ 3-0 L30CM NONABSORBABLE UD L26MM FS 3/8

## (undated) DEVICE — TOTAL TRAY, 16FR 10ML SIL FOLEY, URN: Brand: MEDLINE

## (undated) DEVICE — MARS DISPOSABLE KIT, 3V

## (undated) DEVICE — SOLUTION IRRIG 1000ML STRL H2O USP PLAS POUR BTL

## (undated) DEVICE — MAGNETIC INSTRUMENT PAD 10" X 16"; MEDIUM; DISPOSABLE: Brand: CARDINAL HEALTH

## (undated) DEVICE — SYSTEM SKIN CLSR 22CM DERMBND PRINEO

## (undated) DEVICE — DRAPE,LAP,CHOLE,W/TROUGHS,STERILE: Brand: MEDLINE

## (undated) DEVICE — FLOSEAL HEMOSTATIC MATRIX, 10 ML: Brand: FLOSEAL

## (undated) DEVICE — SUTURE STRATAFIX SPRL MCRYL + SZ 2-0 L18IN ABSRB UD CT-1 SXMP1B413

## (undated) DEVICE — SPHERE STEALTH 12PK/TY --

## (undated) DEVICE — DRAPE MICSCP W46XL120IN POLY DRAWSTRAP W STEREO OBS TB AND

## (undated) DEVICE — SOLUTION IRRIG 1000ML 0.9% SOD CHL USP POUR PLAS BTL

## (undated) DEVICE — GARMENT,MEDLINE,DVT,INT,CALF,MED, GEN2: Brand: MEDLINE

## (undated) DEVICE — COVER,TABLE,60X90,STERILE: Brand: MEDLINE

## (undated) DEVICE — APPLICATOR MEDICATED 10.5 CC SOLUTION HI LT ORNG CHLORAPREP

## (undated) DEVICE — ELECTRODE BLDE L4IN NONINSULATED EDGE

## (undated) DEVICE — DRAPE,CHEST,FENES,15X10,STERIL: Brand: MEDLINE

## (undated) DEVICE — HANDLE LT SNAP ON ULT DURABLE LENS FOR TRUMPF ALC DISPOSABLE

## (undated) DEVICE — 3M™ TEGADERM™ TRANSPARENT FILM DRESSING FRAME STYLE, 1626W, 4 IN X 4-3/4 IN (10 CM X 12 CM), 50/CT 4CT/CASE: Brand: 3M™ TEGADERM™

## (undated) DEVICE — SUTURE PERMAHAND SZ 2-0 L30IN NONABSORBABLE BLK SILK W/O A305H

## (undated) DEVICE — APPLICATOR BNDG 1MM ADH PREMIERPRO EXOFIN

## (undated) DEVICE — PREP SKN CHLRAPRP APL 26ML STR --

## (undated) DEVICE — TRAP FLUID BUFFALO FLTR

## (undated) DEVICE — FLOSEAL HEMOSTATIC MATRIX, 5 ML: Brand: FLOSEAL

## (undated) DEVICE — SOLUTION IRRIG 1000ML H2O STRL BLT

## (undated) DEVICE — CONTAINER,SPECIMEN,4OZ,OR STRL: Brand: MEDLINE

## (undated) DEVICE — PREP SKN PREVAIL 40ML APPL --

## (undated) DEVICE — SUTURE MCRYL SZ 2-0 L36IN ABSRB UD L36MM CT-1 1/2 CIR Y945H